# Patient Record
Sex: FEMALE | Race: BLACK OR AFRICAN AMERICAN | NOT HISPANIC OR LATINO | Employment: FULL TIME | URBAN - METROPOLITAN AREA
[De-identification: names, ages, dates, MRNs, and addresses within clinical notes are randomized per-mention and may not be internally consistent; named-entity substitution may affect disease eponyms.]

---

## 2017-09-18 ENCOUNTER — HISTORICAL (OUTPATIENT)
Dept: INTERNAL MEDICINE | Facility: CLINIC | Age: 52
End: 2017-09-18

## 2017-09-29 ENCOUNTER — HISTORICAL (OUTPATIENT)
Dept: INTERNAL MEDICINE | Facility: CLINIC | Age: 52
End: 2017-09-29

## 2017-10-11 ENCOUNTER — HISTORICAL (OUTPATIENT)
Dept: ADMINISTRATIVE | Facility: HOSPITAL | Age: 52
End: 2017-10-11

## 2017-12-27 ENCOUNTER — HISTORICAL (OUTPATIENT)
Dept: RADIOLOGY | Facility: HOSPITAL | Age: 52
End: 2017-12-27

## 2018-10-01 ENCOUNTER — HISTORICAL (OUTPATIENT)
Dept: INTERNAL MEDICINE | Facility: CLINIC | Age: 53
End: 2018-10-01

## 2018-10-01 ENCOUNTER — HISTORICAL (OUTPATIENT)
Dept: RADIOLOGY | Facility: HOSPITAL | Age: 53
End: 2018-10-01

## 2018-10-01 LAB
ABS NEUT (OLG): 1.89 X10(3)/MCL (ref 2.1–9.2)
ALBUMIN SERPL-MCNC: 3.9 GM/DL (ref 3.4–5)
ALBUMIN/GLOB SERPL: 1 RATIO (ref 1–2)
ALP SERPL-CCNC: 82 UNIT/L (ref 45–117)
ALT SERPL-CCNC: 18 UNIT/L (ref 12–78)
APPEARANCE, UA: CLEAR
AST SERPL-CCNC: 13 UNIT/L (ref 15–37)
BACTERIA #/AREA URNS AUTO: ABNORMAL /[HPF]
BASOPHILS # BLD AUTO: 0.04 X10(3)/MCL
BASOPHILS NFR BLD AUTO: 1 %
BILIRUB SERPL-MCNC: 0.4 MG/DL (ref 0.2–1)
BILIRUB UR QL STRIP: NEGATIVE
BILIRUBIN DIRECT+TOT PNL SERPL-MCNC: 0.1 MG/DL
BILIRUBIN DIRECT+TOT PNL SERPL-MCNC: 0.3 MG/DL
BUN SERPL-MCNC: 15 MG/DL (ref 7–18)
CALCIUM SERPL-MCNC: 9 MG/DL (ref 8.5–10.1)
CHLORIDE SERPL-SCNC: 105 MMOL/L (ref 98–107)
CHOLEST SERPL-MCNC: 199 MG/DL
CHOLEST/HDLC SERPL: 2.6 {RATIO} (ref 0–4.4)
CO2 SERPL-SCNC: 30 MMOL/L (ref 21–32)
COLOR UR: YELLOW
CREAT SERPL-MCNC: 0.9 MG/DL (ref 0.6–1.3)
DEPRECATED CALCIDIOL+CALCIFEROL SERPL-MC: 6.99 NG/ML (ref 30–80)
EOSINOPHIL # BLD AUTO: 0.01 10*3/UL
EOSINOPHIL NFR BLD AUTO: 0 %
ERYTHROCYTE [DISTWIDTH] IN BLOOD BY AUTOMATED COUNT: 13.2 % (ref 11.5–14.5)
GLOBULIN SER-MCNC: 4.4 GM/ML (ref 2.3–3.5)
GLUCOSE (UA): NORMAL
GLUCOSE SERPL-MCNC: 102 MG/DL (ref 74–106)
HCT VFR BLD AUTO: 37.2 % (ref 35–46)
HDLC SERPL-MCNC: 77 MG/DL
HGB BLD-MCNC: 11.7 GM/DL (ref 12–16)
HGB UR QL STRIP: NEGATIVE
HYALINE CASTS #/AREA URNS LPF: ABNORMAL /[LPF]
IMM GRANULOCYTES # BLD AUTO: 0.01 10*3/UL
IMM GRANULOCYTES NFR BLD AUTO: 0 %
IRON SERPL-MCNC: 76 MCG/DL (ref 50–170)
KETONES UR QL STRIP: NEGATIVE
LDLC SERPL CALC-MCNC: 104 MG/DL (ref 0–130)
LEUKOCYTE ESTERASE UR QL STRIP: NEGATIVE
LYMPHOCYTES # BLD AUTO: 1.96 X10(3)/MCL
LYMPHOCYTES NFR BLD AUTO: 46 % (ref 13–40)
MCH RBC QN AUTO: 28.3 PG (ref 26–34)
MCHC RBC AUTO-ENTMCNC: 31.5 GM/DL (ref 31–37)
MCV RBC AUTO: 90.1 FL (ref 80–100)
MONOCYTES # BLD AUTO: 0.34 X10(3)/MCL
MONOCYTES NFR BLD AUTO: 8 % (ref 4–12)
MUCOUS THREADS URNS QL MICRO: ABNORMAL
NEUTROPHILS # BLD AUTO: 1.89 X10(3)/MCL
NEUTROPHILS NFR BLD AUTO: 45 X10(3)/MCL
NITRITE UR QL STRIP: NEGATIVE
PH UR STRIP: 6.5 [PH] (ref 4.5–8)
PLATELET # BLD AUTO: 303 X10(3)/MCL (ref 130–400)
PMV BLD AUTO: 11.6 FL (ref 7.4–10.4)
POTASSIUM SERPL-SCNC: 4 MMOL/L (ref 3.5–5.1)
PROT SERPL-MCNC: 8.3 GM/DL (ref 6.4–8.2)
PROT UR QL STRIP: NEGATIVE
RBC # BLD AUTO: 4.13 X10(6)/MCL (ref 4–5.2)
RBC #/AREA URNS AUTO: ABNORMAL /[HPF]
RPR SER QL: REACTIVE
SODIUM SERPL-SCNC: 140 MMOL/L (ref 136–145)
SP GR UR STRIP: 1.02 (ref 1–1.03)
SQUAMOUS #/AREA URNS LPF: >100 /[LPF]
T PALLIDUM AB SER QL: REACTIVE
TRIGL SERPL-MCNC: 90 MG/DL
TSH SERPL-ACNC: 1.7 MIU/L (ref 0.36–3.74)
UROBILINOGEN UR STRIP-ACNC: NORMAL
VLDLC SERPL CALC-MCNC: 18 MG/DL
WBC # SPEC AUTO: 4.2 X10(3)/MCL (ref 4.5–11)
WBC #/AREA URNS AUTO: ABNORMAL /HPF

## 2019-04-02 ENCOUNTER — HISTORICAL (OUTPATIENT)
Dept: INTERNAL MEDICINE | Facility: CLINIC | Age: 54
End: 2019-04-02

## 2019-04-02 LAB — DEPRECATED CALCIDIOL+CALCIFEROL SERPL-MC: 16.26 NG/ML (ref 30–80)

## 2019-10-02 ENCOUNTER — HISTORICAL (OUTPATIENT)
Dept: ADMINISTRATIVE | Facility: HOSPITAL | Age: 54
End: 2019-10-02

## 2019-10-02 LAB
ABS NEUT (OLG): 1.65 X10(3)/MCL (ref 2.1–9.2)
ALBUMIN SERPL-MCNC: 3.9 GM/DL (ref 3.4–5)
ALBUMIN/GLOB SERPL: 1.1 RATIO (ref 1.1–2)
ALP SERPL-CCNC: 73 UNIT/L (ref 45–117)
ALT SERPL-CCNC: 20 UNIT/L (ref 12–78)
APPEARANCE, UA: CLEAR
AST SERPL-CCNC: 13 UNIT/L (ref 15–37)
BACTERIA #/AREA URNS AUTO: ABNORMAL /[HPF]
BASOPHILS # BLD AUTO: 0 X10(3)/MCL (ref 0–0.2)
BASOPHILS NFR BLD AUTO: 1 %
BILIRUB SERPL-MCNC: 0.5 MG/DL (ref 0.2–1)
BILIRUB UR QL STRIP: NEGATIVE
BILIRUBIN DIRECT+TOT PNL SERPL-MCNC: 0.1 MG/DL (ref 0–0.2)
BILIRUBIN DIRECT+TOT PNL SERPL-MCNC: 0.4 MG/DL
BUN SERPL-MCNC: 12 MG/DL (ref 7–18)
CALCIUM SERPL-MCNC: 9.1 MG/DL (ref 8.5–10.1)
CHLORIDE SERPL-SCNC: 110 MMOL/L (ref 98–107)
CHOLEST SERPL-MCNC: 185 MG/DL
CHOLEST/HDLC SERPL: 2.7 {RATIO} (ref 0–4.4)
CO2 SERPL-SCNC: 28 MMOL/L (ref 21–32)
COLOR UR: YELLOW
CREAT SERPL-MCNC: 0.8 MG/DL (ref 0.6–1.3)
DEPRECATED CALCIDIOL+CALCIFEROL SERPL-MC: 18.09 NG/ML (ref 30–80)
EOSINOPHIL # BLD AUTO: 0 X10(3)/MCL (ref 0–0.9)
EOSINOPHIL NFR BLD AUTO: 0 %
ERYTHROCYTE [DISTWIDTH] IN BLOOD BY AUTOMATED COUNT: 13.2 % (ref 11.5–14.5)
EST. AVERAGE GLUCOSE BLD GHB EST-MCNC: 123 MG/DL
GLOBULIN SER-MCNC: 3.7 GM/ML (ref 2.3–3.5)
GLUCOSE (UA): NORMAL
GLUCOSE SERPL-MCNC: 89 MG/DL (ref 74–106)
HBA1C MFR BLD: 5.9 % (ref 4.2–6.3)
HCT VFR BLD AUTO: 38.5 % (ref 35–46)
HDLC SERPL-MCNC: 68 MG/DL (ref 40–59)
HGB BLD-MCNC: 12.1 GM/DL (ref 12–16)
HGB UR QL STRIP: NEGATIVE
HYALINE CASTS #/AREA URNS LPF: ABNORMAL /[LPF]
KETONES UR QL STRIP: NEGATIVE
LDLC SERPL CALC-MCNC: 95 MG/DL
LEUKOCYTE ESTERASE UR QL STRIP: NEGATIVE
LYMPHOCYTES # BLD AUTO: 1.5 X10(3)/MCL (ref 0.6–4.6)
LYMPHOCYTES NFR BLD AUTO: 41 %
MCH RBC QN AUTO: 28.8 PG (ref 26–34)
MCHC RBC AUTO-ENTMCNC: 31.4 GM/DL (ref 31–37)
MCV RBC AUTO: 91.7 FL (ref 80–100)
MONOCYTES # BLD AUTO: 0.4 X10(3)/MCL (ref 0.1–1.3)
MONOCYTES NFR BLD AUTO: 11 %
NEUTROPHILS # BLD AUTO: 1.65 X10(3)/MCL (ref 2.1–9.2)
NEUTROPHILS NFR BLD AUTO: 46 %
NITRITE UR QL STRIP: NEGATIVE
PH UR STRIP: 6 [PH] (ref 4.5–8)
PLATELET # BLD AUTO: 282 X10(3)/MCL (ref 130–400)
PMV BLD AUTO: 12.6 FL (ref 7.4–10.4)
POTASSIUM SERPL-SCNC: 3.9 MMOL/L (ref 3.5–5.1)
PROT SERPL-MCNC: 7.6 GM/DL (ref 6.4–8.2)
PROT UR QL STRIP: 20 MG/DL
RBC # BLD AUTO: 4.2 X10(6)/MCL (ref 4–5.2)
RBC #/AREA URNS AUTO: ABNORMAL /[HPF]
SODIUM SERPL-SCNC: 142 MMOL/L (ref 136–145)
SP GR UR STRIP: 1.03 (ref 1–1.03)
SQUAMOUS #/AREA URNS LPF: >100 /[LPF]
TRIGL SERPL-MCNC: 112 MG/DL
TSH SERPL-ACNC: 0.79 MIU/L (ref 0.36–3.74)
UROBILINOGEN UR STRIP-ACNC: NORMAL
VLDLC SERPL CALC-MCNC: 22 MG/DL
WBC # SPEC AUTO: 3.6 X10(3)/MCL (ref 4.5–11)
WBC #/AREA URNS AUTO: ABNORMAL /HPF

## 2019-10-03 ENCOUNTER — HISTORICAL (OUTPATIENT)
Dept: RADIOLOGY | Facility: HOSPITAL | Age: 54
End: 2019-10-03

## 2019-10-29 ENCOUNTER — HISTORICAL (OUTPATIENT)
Dept: RADIOLOGY | Facility: HOSPITAL | Age: 54
End: 2019-10-29

## 2020-05-28 ENCOUNTER — HISTORICAL (OUTPATIENT)
Dept: LAB | Facility: HOSPITAL | Age: 55
End: 2020-05-28

## 2020-11-18 ENCOUNTER — HISTORICAL (OUTPATIENT)
Dept: INTERNAL MEDICINE | Facility: CLINIC | Age: 55
End: 2020-11-18

## 2020-11-18 LAB
ABS NEUT (OLG): 1.89 X10(3)/MCL (ref 2.1–9.2)
ALBUMIN SERPL-MCNC: 4 GM/DL (ref 3.5–5)
ALBUMIN/GLOB SERPL: 1.1 RATIO (ref 1.1–2)
ALP SERPL-CCNC: 77 UNIT/L (ref 40–150)
ALT SERPL-CCNC: 12 UNIT/L (ref 0–55)
AST SERPL-CCNC: 13 UNIT/L (ref 5–34)
BASOPHILS # BLD AUTO: 0 X10(3)/MCL (ref 0–0.2)
BASOPHILS NFR BLD AUTO: 1 %
BILIRUB SERPL-MCNC: 0.4 MG/DL
BILIRUBIN DIRECT+TOT PNL SERPL-MCNC: 0.2 MG/DL (ref 0–0.5)
BILIRUBIN DIRECT+TOT PNL SERPL-MCNC: 0.2 MG/DL (ref 0–0.8)
BUN SERPL-MCNC: 16 MG/DL (ref 9.8–20.1)
CALCIUM SERPL-MCNC: 9.6 MG/DL (ref 8.4–10.2)
CHLORIDE SERPL-SCNC: 104 MMOL/L (ref 98–107)
CHOLEST SERPL-MCNC: 194 MG/DL
CHOLEST/HDLC SERPL: 3 {RATIO} (ref 0–5)
CO2 SERPL-SCNC: 27 MMOL/L (ref 22–29)
CREAT SERPL-MCNC: 0.79 MG/DL (ref 0.55–1.02)
DEPRECATED CALCIDIOL+CALCIFEROL SERPL-MC: 26.3 NG/ML (ref 30–80)
ERYTHROCYTE [DISTWIDTH] IN BLOOD BY AUTOMATED COUNT: 13.3 % (ref 11.5–14.5)
EST. AVERAGE GLUCOSE BLD GHB EST-MCNC: 114 MG/DL
GLOBULIN SER-MCNC: 3.5 GM/DL (ref 2.4–3.5)
GLUCOSE SERPL-MCNC: 92 MG/DL (ref 74–100)
HBA1C MFR BLD: 5.6 %
HCT VFR BLD AUTO: 38 % (ref 35–46)
HDLC SERPL-MCNC: 69 MG/DL (ref 35–60)
HGB BLD-MCNC: 11.6 GM/DL (ref 12–16)
IMM GRANULOCYTES # BLD AUTO: 0.01 10*3/UL
IMM GRANULOCYTES NFR BLD AUTO: 0 %
LDLC SERPL CALC-MCNC: 112 MG/DL (ref 50–140)
LYMPHOCYTES # BLD AUTO: 1.8 X10(3)/MCL (ref 0.6–4.6)
LYMPHOCYTES NFR BLD AUTO: 43 %
MCH RBC QN AUTO: 28.2 PG (ref 26–34)
MCHC RBC AUTO-ENTMCNC: 30.5 GM/DL (ref 31–37)
MCV RBC AUTO: 92.5 FL (ref 80–100)
MONOCYTES # BLD AUTO: 0.4 X10(3)/MCL (ref 0.1–1.3)
MONOCYTES NFR BLD AUTO: 10 %
NEUTROPHILS # BLD AUTO: 1.89 X10(3)/MCL (ref 2.1–9.2)
NEUTROPHILS NFR BLD AUTO: 45 %
PLATELET # BLD AUTO: 266 X10(3)/MCL (ref 130–400)
PMV BLD AUTO: 12.4 FL (ref 7.4–10.4)
POTASSIUM SERPL-SCNC: 4.3 MMOL/L (ref 3.5–5.1)
PROT SERPL-MCNC: 7.5 GM/DL (ref 6.4–8.3)
RBC # BLD AUTO: 4.11 X10(6)/MCL (ref 4–5.2)
SODIUM SERPL-SCNC: 140 MMOL/L (ref 136–145)
TRIGL SERPL-MCNC: 64 MG/DL (ref 37–140)
VLDLC SERPL CALC-MCNC: 13 MG/DL
WBC # SPEC AUTO: 4.2 X10(3)/MCL (ref 4.5–11)

## 2020-12-18 ENCOUNTER — HISTORICAL (OUTPATIENT)
Dept: RADIOLOGY | Facility: HOSPITAL | Age: 55
End: 2020-12-18

## 2021-05-14 ENCOUNTER — HISTORICAL (OUTPATIENT)
Dept: INTERNAL MEDICINE | Facility: CLINIC | Age: 56
End: 2021-05-14

## 2021-05-14 LAB
ABS NEUT (OLG): 1.29 X10(3)/MCL (ref 2.1–9.2)
ALBUMIN SERPL-MCNC: 4.2 GM/DL (ref 3.5–5)
ALBUMIN/GLOB SERPL: 1.3 RATIO (ref 1.1–2)
ALP SERPL-CCNC: 86 UNIT/L (ref 40–150)
ALT SERPL-CCNC: 11 UNIT/L (ref 0–55)
AST SERPL-CCNC: 12 UNIT/L (ref 5–34)
BASOPHILS # BLD AUTO: 0 X10(3)/MCL (ref 0–0.2)
BASOPHILS NFR BLD AUTO: 1 %
BILIRUB SERPL-MCNC: 0.6 MG/DL
BILIRUBIN DIRECT+TOT PNL SERPL-MCNC: 0.2 MG/DL (ref 0–0.5)
BILIRUBIN DIRECT+TOT PNL SERPL-MCNC: 0.4 MG/DL (ref 0–0.8)
BUN SERPL-MCNC: 15.6 MG/DL (ref 9.8–20.1)
CALCIUM SERPL-MCNC: 10.2 MG/DL (ref 8.4–10.2)
CHLORIDE SERPL-SCNC: 105 MMOL/L (ref 98–107)
CHOLEST SERPL-MCNC: 210 MG/DL
CHOLEST/HDLC SERPL: 3 {RATIO} (ref 0–5)
CO2 SERPL-SCNC: 28 MMOL/L (ref 22–29)
CREAT SERPL-MCNC: 0.8 MG/DL (ref 0.55–1.02)
DEPRECATED CALCIDIOL+CALCIFEROL SERPL-MC: 35 NG/ML (ref 30–80)
EOSINOPHIL # BLD AUTO: 0 X10(3)/MCL (ref 0–0.9)
EOSINOPHIL NFR BLD AUTO: 0 %
ERYTHROCYTE [DISTWIDTH] IN BLOOD BY AUTOMATED COUNT: 13.2 % (ref 11.5–14.5)
EST. AVERAGE GLUCOSE BLD GHB EST-MCNC: 114 MG/DL
GLOBULIN SER-MCNC: 3.2 GM/DL (ref 2.4–3.5)
GLUCOSE SERPL-MCNC: 91 MG/DL (ref 74–100)
HBA1C MFR BLD: 5.6 %
HCT VFR BLD AUTO: 37.8 % (ref 35–46)
HDLC SERPL-MCNC: 70 MG/DL (ref 35–60)
HGB BLD-MCNC: 11.7 GM/DL (ref 12–16)
IMM GRANULOCYTES # BLD AUTO: 0.01 10*3/UL
IMM GRANULOCYTES NFR BLD AUTO: 0 %
LDLC SERPL CALC-MCNC: 123 MG/DL (ref 50–140)
LYMPHOCYTES # BLD AUTO: 1.4 X10(3)/MCL (ref 0.6–4.6)
LYMPHOCYTES NFR BLD AUTO: 45 %
MCH RBC QN AUTO: 28.1 PG (ref 26–34)
MCHC RBC AUTO-ENTMCNC: 31 GM/DL (ref 31–37)
MCV RBC AUTO: 90.6 FL (ref 80–100)
MONOCYTES # BLD AUTO: 0.4 X10(3)/MCL (ref 0.1–1.3)
MONOCYTES NFR BLD AUTO: 12 %
NEUTROPHILS # BLD AUTO: 1.29 X10(3)/MCL (ref 2.1–9.2)
NEUTROPHILS NFR BLD AUTO: 42 %
PLATELET # BLD AUTO: 282 X10(3)/MCL (ref 130–400)
PMV BLD AUTO: 11.7 FL (ref 7.4–10.4)
POTASSIUM SERPL-SCNC: 4.5 MMOL/L (ref 3.5–5.1)
PROT SERPL-MCNC: 7.4 GM/DL (ref 6.4–8.3)
RBC # BLD AUTO: 4.17 X10(6)/MCL (ref 4–5.2)
SODIUM SERPL-SCNC: 141 MMOL/L (ref 136–145)
TRIGL SERPL-MCNC: 84 MG/DL (ref 37–140)
VLDLC SERPL CALC-MCNC: 17 MG/DL
WBC # SPEC AUTO: 3.1 X10(3)/MCL (ref 4.5–11)

## 2021-05-20 ENCOUNTER — HISTORICAL (OUTPATIENT)
Dept: ADMINISTRATIVE | Facility: HOSPITAL | Age: 56
End: 2021-05-20

## 2021-06-17 LAB — CRC RECOMMENDATION EXT: NORMAL

## 2021-12-22 ENCOUNTER — HISTORICAL (OUTPATIENT)
Dept: RADIOLOGY | Facility: HOSPITAL | Age: 56
End: 2021-12-22

## 2022-01-01 ENCOUNTER — CLINICAL SUPPORT (OUTPATIENT)
Dept: OTHER | Facility: CLINIC | Age: 57
End: 2022-01-01

## 2022-01-01 ENCOUNTER — LAB VISIT (OUTPATIENT)
Dept: LAB | Facility: HOSPITAL | Age: 57
End: 2022-01-01
Attending: NURSE PRACTITIONER
Payer: COMMERCIAL

## 2022-01-01 ENCOUNTER — PROCEDURE VISIT (OUTPATIENT)
Dept: ORTHOPEDICS | Facility: CLINIC | Age: 57
End: 2022-01-01
Payer: COMMERCIAL

## 2022-01-01 ENCOUNTER — HOSPITAL ENCOUNTER (OUTPATIENT)
Dept: RADIOLOGY | Facility: HOSPITAL | Age: 57
Discharge: HOME | End: 2022-12-30
Attending: NURSE PRACTITIONER
Payer: COMMERCIAL

## 2022-01-01 ENCOUNTER — OFFICE VISIT (OUTPATIENT)
Dept: ORTHOPEDICS | Facility: CLINIC | Age: 57
End: 2022-01-01
Payer: COMMERCIAL

## 2022-01-01 ENCOUNTER — TELEPHONE (OUTPATIENT)
Dept: INTERNAL MEDICINE | Facility: CLINIC | Age: 57
End: 2022-01-01
Payer: COMMERCIAL

## 2022-01-01 ENCOUNTER — TELEPHONE (OUTPATIENT)
Dept: GYNECOLOGY | Facility: CLINIC | Age: 57
End: 2022-01-01
Payer: COMMERCIAL

## 2022-01-01 ENCOUNTER — HOSPITAL ENCOUNTER (EMERGENCY)
Facility: HOSPITAL | Age: 57
Discharge: HOME | End: 2022-10-11
Attending: FAMILY MEDICINE
Payer: COMMERCIAL

## 2022-01-01 ENCOUNTER — OFFICE VISIT (OUTPATIENT)
Dept: GYNECOLOGY | Facility: CLINIC | Age: 57
End: 2022-01-01
Payer: COMMERCIAL

## 2022-01-01 ENCOUNTER — OFFICE VISIT (OUTPATIENT)
Dept: INTERNAL MEDICINE | Facility: CLINIC | Age: 57
End: 2022-01-01
Payer: COMMERCIAL

## 2022-01-01 ENCOUNTER — DOCUMENTATION ONLY (OUTPATIENT)
Dept: INTERNAL MEDICINE | Facility: CLINIC | Age: 57
End: 2022-01-01

## 2022-01-01 ENCOUNTER — HOSPITAL ENCOUNTER (OUTPATIENT)
Dept: RADIOLOGY | Facility: HOSPITAL | Age: 57
Discharge: HOME | End: 2022-12-29
Attending: NURSE PRACTITIONER
Payer: COMMERCIAL

## 2022-01-01 ENCOUNTER — CLINICAL SUPPORT (OUTPATIENT)
Dept: INTERNAL MEDICINE | Facility: CLINIC | Age: 57
End: 2022-01-01
Payer: COMMERCIAL

## 2022-01-01 VITALS
WEIGHT: 227 LBS | WEIGHT: 227.06 LBS | TEMPERATURE: 98 F | RESPIRATION RATE: 16 BRPM | SYSTOLIC BLOOD PRESSURE: 139 MMHG | DIASTOLIC BLOOD PRESSURE: 81 MMHG | BODY MASS INDEX: 36.48 KG/M2 | HEIGHT: 66 IN | BODY MASS INDEX: 36.49 KG/M2 | HEIGHT: 66 IN | TEMPERATURE: 98 F

## 2022-01-01 VITALS
BODY MASS INDEX: 37.57 KG/M2 | SYSTOLIC BLOOD PRESSURE: 119 MMHG | WEIGHT: 225.5 LBS | OXYGEN SATURATION: 100 % | HEIGHT: 65 IN | DIASTOLIC BLOOD PRESSURE: 78 MMHG

## 2022-01-01 VITALS
HEART RATE: 65 BPM | WEIGHT: 223 LBS | BODY MASS INDEX: 37.15 KG/M2 | DIASTOLIC BLOOD PRESSURE: 79 MMHG | TEMPERATURE: 98 F | SYSTOLIC BLOOD PRESSURE: 115 MMHG | HEIGHT: 65 IN

## 2022-01-01 VITALS
BODY MASS INDEX: 35.36 KG/M2 | WEIGHT: 220 LBS | RESPIRATION RATE: 18 BRPM | HEART RATE: 83 BPM | TEMPERATURE: 99 F | HEIGHT: 66 IN | SYSTOLIC BLOOD PRESSURE: 135 MMHG | DIASTOLIC BLOOD PRESSURE: 51 MMHG | OXYGEN SATURATION: 99 %

## 2022-01-01 VITALS
BODY MASS INDEX: 37.79 KG/M2 | SYSTOLIC BLOOD PRESSURE: 123 MMHG | HEIGHT: 65 IN | WEIGHT: 226.81 LBS | DIASTOLIC BLOOD PRESSURE: 81 MMHG

## 2022-01-01 VITALS
TEMPERATURE: 98 F | HEART RATE: 68 BPM | HEIGHT: 66 IN | RESPIRATION RATE: 16 BRPM | SYSTOLIC BLOOD PRESSURE: 123 MMHG | WEIGHT: 230 LBS | OXYGEN SATURATION: 100 % | DIASTOLIC BLOOD PRESSURE: 74 MMHG | BODY MASS INDEX: 36.96 KG/M2

## 2022-01-01 VITALS — HEIGHT: 66 IN | BODY MASS INDEX: 35.36 KG/M2 | WEIGHT: 220 LBS

## 2022-01-01 DIAGNOSIS — E78.2 MIXED HYPERLIPIDEMIA: ICD-10-CM

## 2022-01-01 DIAGNOSIS — M17.12 TRICOMPARTMENT OSTEOARTHRITIS OF LEFT KNEE: Primary | ICD-10-CM

## 2022-01-01 DIAGNOSIS — Z00.00 WELLNESS EXAMINATION: Primary | ICD-10-CM

## 2022-01-01 DIAGNOSIS — R59.9 ENLARGED LYMPH NODE: ICD-10-CM

## 2022-01-01 DIAGNOSIS — Z12.31 ENCOUNTER FOR SCREENING MAMMOGRAM FOR MALIGNANT NEOPLASM OF BREAST: ICD-10-CM

## 2022-01-01 DIAGNOSIS — Z12.11 COLON CANCER SCREENING: ICD-10-CM

## 2022-01-01 DIAGNOSIS — Z00.8 ENCOUNTER FOR OTHER GENERAL EXAMINATION: ICD-10-CM

## 2022-01-01 DIAGNOSIS — N61.0 CELLULITIS OF RIGHT BREAST: Primary | ICD-10-CM

## 2022-01-01 DIAGNOSIS — R59.0 AXILLARY ADENOPATHY: ICD-10-CM

## 2022-01-01 DIAGNOSIS — N64.4 BREAST PAIN, LEFT: Primary | ICD-10-CM

## 2022-01-01 DIAGNOSIS — M17.12 PRIMARY OSTEOARTHRITIS OF LEFT KNEE: Primary | ICD-10-CM

## 2022-01-01 DIAGNOSIS — N64.4 BREAST PAIN, LEFT: ICD-10-CM

## 2022-01-01 DIAGNOSIS — Z00.00 WELLNESS EXAMINATION: ICD-10-CM

## 2022-01-01 DIAGNOSIS — R73.03 PREDIABETES: ICD-10-CM

## 2022-01-01 DIAGNOSIS — N61.0 CELLULITIS OF RIGHT BREAST: ICD-10-CM

## 2022-01-01 DIAGNOSIS — N63.10 BREAST MASS, RIGHT: ICD-10-CM

## 2022-01-01 DIAGNOSIS — E55.9 VITAMIN D DEFICIENCY: ICD-10-CM

## 2022-01-01 DIAGNOSIS — N63.15 MASS OVERLAPPING MULTIPLE QUADRANTS OF RIGHT BREAST: ICD-10-CM

## 2022-01-01 DIAGNOSIS — Z01.419 WOMEN'S ANNUAL ROUTINE GYNECOLOGICAL EXAMINATION: Primary | ICD-10-CM

## 2022-01-01 DIAGNOSIS — N64.4 BREAST PAIN, RIGHT: Primary | ICD-10-CM

## 2022-01-01 LAB
ALBUMIN SERPL-MCNC: 4 GM/DL (ref 3.5–5)
ALBUMIN/GLOB SERPL: 1 RATIO (ref 1.1–2)
ALP SERPL-CCNC: 77 UNIT/L (ref 40–150)
ALT SERPL-CCNC: 12 UNIT/L (ref 0–55)
APPEARANCE UR: ABNORMAL
AST SERPL-CCNC: 12 UNIT/L (ref 5–34)
BACTERIA #/AREA URNS AUTO: ABNORMAL /HPF
BASOPHILS # BLD AUTO: 0.02 X10(3)/MCL (ref 0–0.2)
BASOPHILS NFR BLD AUTO: 0.5 %
BILIRUB UR QL STRIP.AUTO: NEGATIVE MG/DL
BILIRUBIN DIRECT+TOT PNL SERPL-MCNC: 0.5 MG/DL
BUN SERPL-MCNC: 13 MG/DL (ref 9.8–20.1)
CALCIUM SERPL-MCNC: 10.2 MG/DL (ref 8.4–10.2)
CHLORIDE SERPL-SCNC: 105 MMOL/L (ref 98–107)
CHOLEST SERPL-MCNC: 240 MG/DL
CHOLEST/HDLC SERPL: 3 {RATIO} (ref 0–5)
CO2 SERPL-SCNC: 28 MMOL/L (ref 22–29)
COLOR UR AUTO: YELLOW
CREAT SERPL-MCNC: 0.8 MG/DL (ref 0.55–1.02)
EOSINOPHIL # BLD AUTO: 0.01 X10(3)/MCL (ref 0–0.9)
EOSINOPHIL NFR BLD AUTO: 0.3 %
ERYTHROCYTE [DISTWIDTH] IN BLOOD BY AUTOMATED COUNT: 12.9 % (ref 11.5–17)
GLOBULIN SER-MCNC: 3.9 GM/DL (ref 2.4–3.5)
GLUCOSE SERPL-MCNC: 108 MG/DL (ref 60–140)
GLUCOSE SERPL-MCNC: 98 MG/DL (ref 74–100)
GLUCOSE UR QL STRIP.AUTO: NORMAL MG/DL
GRAM STN SPEC: NORMAL
GRAM STN SPEC: NORMAL
HBA1C MFR BLD: NORMAL %
HCT VFR BLD AUTO: 40.3 % (ref 37–47)
HDLC SERPL-MCNC: 53 MG/DL
HDLC SERPL-MCNC: 80 MG/DL (ref 35–60)
HGB BLD-MCNC: 12.3 GM/DL (ref 12–16)
HYALINE CASTS #/AREA URNS LPF: ABNORMAL /LPF
IMM GRANULOCYTES # BLD AUTO: 0.01 X10(3)/MCL (ref 0–0.02)
IMM GRANULOCYTES NFR BLD AUTO: 0.3 % (ref 0–0.43)
INSULIN SERPL-ACNC: NORMAL U[IU]/ML
KETONES UR QL STRIP.AUTO: NEGATIVE MG/DL
LAB AP BETHESDA CATEGORY: NORMAL
LAB AP CLINICAL FINDINGS: NORMAL
LAB AP CONTRACEPTIVES: NORMAL
LAB AP GYN MOLECULAR TESTING: NORMAL
LAB AP LMP DATE: NORMAL
LAB AP OCHS PAP SPECIMEN ADEQUACY: NORMAL
LAB AP OHS PAP INTERPRETATION: NORMAL
LAB AP PAP DISCLAIMER COMMENTS: NORMAL
LAB AP PAP ESTROGEN REPLACEMENT THERAPY: NORMAL
LAB AP PAP PMP: NORMAL
LAB AP PAP PREVIOUS BX: NORMAL
LAB AP PAP PRIOR TREATMENT: NORMAL
LDLC SERPL CALC-MCNC: 146 MG/DL (ref 50–140)
LEUKOCYTE ESTERASE UR QL STRIP.AUTO: 25 UNIT/L
LYMPHOCYTES # BLD AUTO: 1.68 X10(3)/MCL (ref 0.6–4.6)
LYMPHOCYTES NFR BLD AUTO: 44.8 %
MCH RBC QN AUTO: 27.9 PG (ref 27–31)
MCHC RBC AUTO-ENTMCNC: 30.5 MG/DL (ref 33–36)
MCV RBC AUTO: 91.4 FL (ref 80–94)
MONOCYTES # BLD AUTO: 0.36 X10(3)/MCL (ref 0.1–1.3)
MONOCYTES NFR BLD AUTO: 9.6 %
MUCOUS THREADS URNS QL MICRO: ABNORMAL /LPF
NEUTROPHILS # BLD AUTO: 1.7 X10(3)/MCL (ref 2.1–9.2)
NEUTROPHILS NFR BLD AUTO: 44.5 %
NITRITE UR QL STRIP.AUTO: NEGATIVE
NONINV COLON CA DNA+OCC BLD SCRN STL QL: NEGATIVE
NRBC BLD AUTO-RTO: 0 %
PH UR STRIP.AUTO: 6 [PH]
PLATELET # BLD AUTO: 298 X10(3)/MCL (ref 130–400)
PMV BLD AUTO: 12.1 FL (ref 9.4–12.4)
POC CHOLESTEROL, LDL (DOCK): 116.41 MG/DL
POC CHOLESTEROL, TOTAL: 196 MG/DL
POTASSIUM SERPL-SCNC: 4.1 MMOL/L (ref 3.5–5.1)
PROT SERPL-MCNC: 7.9 GM/DL (ref 6.4–8.3)
PROT UR QL STRIP.AUTO: NEGATIVE MG/DL
RBC # BLD AUTO: 4.41 X10(6)/MCL (ref 4.2–5.4)
RBC #/AREA URNS AUTO: ABNORMAL /HPF
RBC UR QL AUTO: NEGATIVE UNIT/L
SODIUM SERPL-SCNC: 142 MMOL/L (ref 136–145)
SP GR UR STRIP.AUTO: 1.02
SQUAMOUS #/AREA URNS LPF: ABNORMAL /HPF
T4 FREE SERPL-MCNC: 0.93 NG/DL (ref 0.7–1.48)
TRIGL SERPL-MCNC: 151 MG/DL
TRIGL SERPL-MCNC: 69 MG/DL (ref 37–140)
TSH SERPL-ACNC: 1.41 UIU/ML (ref 0.35–4.94)
UROBILINOGEN UR STRIP-ACNC: NORMAL MG/DL
VLDLC SERPL CALC-MCNC: 14 MG/DL
WBC # SPEC AUTO: 3.8 X10(3)/MCL (ref 4.5–11.5)
WBC #/AREA URNS AUTO: ABNORMAL /HPF

## 2022-01-01 PROCEDURE — 81001 URINALYSIS AUTO W/SCOPE: CPT

## 2022-01-01 PROCEDURE — 1159F MED LIST DOCD IN RCRD: CPT | Mod: CPTII,,, | Performed by: NURSE PRACTITIONER

## 2022-01-01 PROCEDURE — 3075F SYST BP GE 130 - 139MM HG: CPT | Mod: CPTII,,, | Performed by: NURSE PRACTITIONER

## 2022-01-01 PROCEDURE — 1160F RVW MEDS BY RX/DR IN RCRD: CPT | Mod: CPTII,95,, | Performed by: NURSE PRACTITIONER

## 2022-01-01 PROCEDURE — 87077 CULTURE AEROBIC IDENTIFY: CPT | Performed by: RADIOLOGY

## 2022-01-01 PROCEDURE — 99284 EMERGENCY DEPT VISIT MOD MDM: CPT | Mod: 25

## 2022-01-01 PROCEDURE — 96372 THER/PROPH/DIAG INJ SC/IM: CPT | Mod: PBBFAC

## 2022-01-01 PROCEDURE — 76642 US BREAST RIGHT LIMITED: ICD-10-PCS | Mod: 26,RT,, | Performed by: RADIOLOGY

## 2022-01-01 PROCEDURE — 1159F PR MEDICATION LIST DOCUMENTED IN MEDICAL RECORD: ICD-10-PCS | Mod: CPTII,,, | Performed by: NURSE PRACTITIONER

## 2022-01-01 PROCEDURE — 3046F PR MOST RECENT HEMOGLOBIN A1C LEVEL > 9.0%: ICD-10-PCS | Mod: CPTII,95,, | Performed by: NURSE PRACTITIONER

## 2022-01-01 PROCEDURE — 99499 UNLISTED E&M SERVICE: CPT | Mod: S$PBB,,, | Performed by: NURSE PRACTITIONER

## 2022-01-01 PROCEDURE — 1159F MED LIST DOCD IN RCRD: CPT | Mod: CPTII,95,, | Performed by: NURSE PRACTITIONER

## 2022-01-01 PROCEDURE — 1160F PR REVIEW ALL MEDS BY PRESCRIBER/CLIN PHARMACIST DOCUMENTED: ICD-10-PCS | Mod: CPTII,95,, | Performed by: NURSE PRACTITIONER

## 2022-01-01 PROCEDURE — 3008F BODY MASS INDEX DOCD: CPT | Mod: CPTII,,, | Performed by: NURSE PRACTITIONER

## 2022-01-01 PROCEDURE — 3078F PR MOST RECENT DIASTOLIC BLOOD PRESSURE < 80 MM HG: ICD-10-PCS | Mod: CPTII,,, | Performed by: NURSE PRACTITIONER

## 2022-01-01 PROCEDURE — 3074F PR MOST RECENT SYSTOLIC BLOOD PRESSURE < 130 MM HG: ICD-10-PCS | Mod: CPTII,,, | Performed by: NURSE PRACTITIONER

## 2022-01-01 PROCEDURE — 38505 NEEDLE BIOPSY LYMPH NODES: CPT | Mod: RT,,, | Performed by: RADIOLOGY

## 2022-01-01 PROCEDURE — 36415 COLL VENOUS BLD VENIPUNCTURE: CPT

## 2022-01-01 PROCEDURE — 1160F PR REVIEW ALL MEDS BY PRESCRIBER/CLIN PHARMACIST DOCUMENTED: ICD-10-PCS | Mod: CPTII,,, | Performed by: NURSE PRACTITIONER

## 2022-01-01 PROCEDURE — 77062 MAMMO DIGITAL DIAGNOSTIC BILAT WITH TOMO: ICD-10-PCS | Mod: 26,,, | Performed by: RADIOLOGY

## 2022-01-01 PROCEDURE — 99212 OFFICE O/P EST SF 10 MIN: CPT | Mod: PBBFAC

## 2022-01-01 PROCEDURE — 3046F PR MOST RECENT HEMOGLOBIN A1C LEVEL > 9.0%: ICD-10-PCS | Mod: CPTII,,, | Performed by: NURSE PRACTITIONER

## 2022-01-01 PROCEDURE — 80061 LIPID PANEL: CPT

## 2022-01-01 PROCEDURE — 38505 US BIOPSY LYMPH NODE AXILLA: ICD-10-PCS | Mod: RT,,, | Performed by: RADIOLOGY

## 2022-01-01 PROCEDURE — 19083 BX BREAST 1ST LESION US IMAG: CPT | Mod: RT,,, | Performed by: RADIOLOGY

## 2022-01-01 PROCEDURE — 99215 OFFICE O/P EST HI 40 MIN: CPT | Mod: S$PBB,,, | Performed by: NURSE PRACTITIONER

## 2022-01-01 PROCEDURE — 85025 COMPLETE CBC W/AUTO DIFF WBC: CPT

## 2022-01-01 PROCEDURE — 76642 ULTRASOUND BREAST LIMITED: CPT | Mod: TC,RT

## 2022-01-01 PROCEDURE — 30000890 GENERAL MISCELLANEOUS TEST (BEAKER): Performed by: PATHOLOGY

## 2022-01-01 PROCEDURE — 77062 BREAST TOMOSYNTHESIS BI: CPT | Mod: TC

## 2022-01-01 PROCEDURE — 99442 PR PHYSICIAN TELEPHONE EVALUATION 11-20 MIN: ICD-10-PCS | Mod: 95,,, | Performed by: NURSE PRACTITIONER

## 2022-01-01 PROCEDURE — 99442 PR PHYSICIAN TELEPHONE EVALUATION 11-20 MIN: CPT | Mod: 95,,, | Performed by: NURSE PRACTITIONER

## 2022-01-01 PROCEDURE — A4648 IMPLANTABLE TISSUE MARKER: HCPCS

## 2022-01-01 PROCEDURE — 80053 COMPREHEN METABOLIC PANEL: CPT

## 2022-01-01 PROCEDURE — 99214 OFFICE O/P EST MOD 30 MIN: CPT | Mod: PBBFAC | Performed by: NURSE PRACTITIONER

## 2022-01-01 PROCEDURE — 19083 US BREAST BIOPSY WITH IMAGING 1ST SITE RIGHT: ICD-10-PCS | Mod: RT,,, | Performed by: RADIOLOGY

## 2022-01-01 PROCEDURE — 3008F PR BODY MASS INDEX (BMI) DOCUMENTED: ICD-10-PCS | Mod: CPTII,,, | Performed by: NURSE PRACTITIONER

## 2022-01-01 PROCEDURE — 3079F PR MOST RECENT DIASTOLIC BLOOD PRESSURE 80-89 MM HG: ICD-10-PCS | Mod: CPTII,,, | Performed by: NURSE PRACTITIONER

## 2022-01-01 PROCEDURE — 3074F SYST BP LT 130 MM HG: CPT | Mod: CPTII,,, | Performed by: NURSE PRACTITIONER

## 2022-01-01 PROCEDURE — 88360 TUMOR IMMUNOHISTOCHEM/MANUAL: CPT | Performed by: PATHOLOGY

## 2022-01-01 PROCEDURE — 77062 BREAST TOMOSYNTHESIS BI: CPT | Mod: 26,,, | Performed by: RADIOLOGY

## 2022-01-01 PROCEDURE — 99499 NO LOS: ICD-10-PCS | Mod: S$PBB,,, | Performed by: NURSE PRACTITIONER

## 2022-01-01 PROCEDURE — 99215 PR OFFICE/OUTPT VISIT, EST, LEVL V, 40-54 MIN: ICD-10-PCS | Mod: S$PBB,,, | Performed by: NURSE PRACTITIONER

## 2022-01-01 PROCEDURE — 1160F RVW MEDS BY RX/DR IN RCRD: CPT | Mod: CPTII,,, | Performed by: NURSE PRACTITIONER

## 2022-01-01 PROCEDURE — 77066 DX MAMMO INCL CAD BI: CPT | Mod: 26,,, | Performed by: RADIOLOGY

## 2022-01-01 PROCEDURE — 38505 NEEDLE BIOPSY LYMPH NODES: CPT

## 2022-01-01 PROCEDURE — 87205 SMEAR GRAM STAIN: CPT | Performed by: RADIOLOGY

## 2022-01-01 PROCEDURE — 3075F PR MOST RECENT SYSTOLIC BLOOD PRESS GE 130-139MM HG: ICD-10-PCS | Mod: CPTII,,, | Performed by: NURSE PRACTITIONER

## 2022-01-01 PROCEDURE — 19083 BX BREAST 1ST LESION US IMAG: CPT | Mod: RT

## 2022-01-01 PROCEDURE — 84443 ASSAY THYROID STIM HORMONE: CPT

## 2022-01-01 PROCEDURE — 99396 PREV VISIT EST AGE 40-64: CPT | Mod: S$PBB,,, | Performed by: NURSE PRACTITIONER

## 2022-01-01 PROCEDURE — 3079F DIAST BP 80-89 MM HG: CPT | Mod: CPTII,,, | Performed by: NURSE PRACTITIONER

## 2022-01-01 PROCEDURE — 76642 ULTRASOUND BREAST LIMITED: CPT | Mod: 26,RT,, | Performed by: RADIOLOGY

## 2022-01-01 PROCEDURE — 88305 TISSUE EXAM BY PATHOLOGIST: CPT | Performed by: RADIOLOGY

## 2022-01-01 PROCEDURE — 77066 MAMMO DIGITAL DIAGNOSTIC BILAT WITH TOMO: ICD-10-PCS | Mod: 26,,, | Performed by: RADIOLOGY

## 2022-01-01 PROCEDURE — 3046F HEMOGLOBIN A1C LEVEL >9.0%: CPT | Mod: CPTII,,, | Performed by: NURSE PRACTITIONER

## 2022-01-01 PROCEDURE — 1159F PR MEDICATION LIST DOCUMENTED IN MEDICAL RECORD: ICD-10-PCS | Mod: CPTII,95,, | Performed by: NURSE PRACTITIONER

## 2022-01-01 PROCEDURE — 88142 CYTOPATH C/V THIN LAYER: CPT | Performed by: NURSE PRACTITIONER

## 2022-01-01 PROCEDURE — 84439 ASSAY OF FREE THYROXINE: CPT

## 2022-01-01 PROCEDURE — 99213 PR OFFICE/OUTPT VISIT, EST, LEVL III, 20-29 MIN: ICD-10-PCS | Mod: S$PBB,,, | Performed by: NURSE PRACTITIONER

## 2022-01-01 PROCEDURE — 3078F DIAST BP <80 MM HG: CPT | Mod: CPTII,,, | Performed by: NURSE PRACTITIONER

## 2022-01-01 PROCEDURE — 99396 PR PREVENTIVE VISIT,EST,40-64: ICD-10-PCS | Mod: S$PBB,,, | Performed by: NURSE PRACTITIONER

## 2022-01-01 PROCEDURE — 99213 OFFICE O/P EST LOW 20 MIN: CPT | Mod: S$PBB,,, | Performed by: NURSE PRACTITIONER

## 2022-01-01 PROCEDURE — 3046F HEMOGLOBIN A1C LEVEL >9.0%: CPT | Mod: CPTII,95,, | Performed by: NURSE PRACTITIONER

## 2022-01-01 PROCEDURE — 83036 HEMOGLOBIN GLYCOSYLATED A1C: CPT | Mod: PBBFAC | Performed by: NURSE PRACTITIONER

## 2022-01-01 PROCEDURE — 99211 OFF/OP EST MAY X REQ PHY/QHP: CPT | Mod: PBBFAC

## 2022-01-01 RX ORDER — LIDOCAINE HYDROCHLORIDE 10 MG/ML
2.1 INJECTION, SOLUTION EPIDURAL; INFILTRATION; INTRACAUDAL; PERINEURAL
Status: COMPLETED | OUTPATIENT
Start: 2022-01-01 | End: 2022-01-01

## 2022-01-01 RX ORDER — WATER FOR INJECTION,STERILE
2.1 VIAL (ML) INJECTION
Status: DISCONTINUED | OUTPATIENT
Start: 2022-01-01 | End: 2022-01-01

## 2022-01-01 RX ORDER — LIDOCAINE HYDROCHLORIDE 10 MG/ML
2 INJECTION, SOLUTION EPIDURAL; INFILTRATION; INTRACAUDAL; PERINEURAL
Status: COMPLETED | OUTPATIENT
Start: 2022-01-01 | End: 2022-01-01

## 2022-01-01 RX ORDER — HYDROCODONE BITARTRATE AND ACETAMINOPHEN 7.5; 325 MG/1; MG/1
1 TABLET ORAL EVERY 6 HOURS PRN
Qty: 12 TABLET | Refills: 0 | Status: SHIPPED | OUTPATIENT
Start: 2022-01-01 | End: 2022-01-01 | Stop reason: SDUPTHER

## 2022-01-01 RX ORDER — CEFTRIAXONE 1 G/1
1 INJECTION, POWDER, FOR SOLUTION INTRAMUSCULAR; INTRAVENOUS
Status: COMPLETED | OUTPATIENT
Start: 2022-01-01 | End: 2022-01-01

## 2022-01-01 RX ORDER — LIDOCAINE HYDROCHLORIDE 10 MG/ML
2.1 INJECTION INFILTRATION; PERINEURAL
Status: COMPLETED | OUTPATIENT
Start: 2022-01-01 | End: 2022-01-01

## 2022-01-01 RX ORDER — CHOLECALCIFEROL (VITAMIN D3) 25 MCG
5000 TABLET ORAL DAILY
Status: ON HOLD | COMMUNITY
End: 2023-01-01

## 2022-01-01 RX ORDER — AMOXICILLIN 500 MG
CAPSULE ORAL DAILY
Status: ON HOLD | COMMUNITY
End: 2023-01-01

## 2022-01-01 RX ORDER — LIDOCAINE HYDROCHLORIDE 20 MG/ML
INJECTION, SOLUTION EPIDURAL; INFILTRATION; INTRACAUDAL; PERINEURAL
Status: DISCONTINUED
Start: 2022-01-01 | End: 2022-01-01 | Stop reason: HOSPADM

## 2022-01-01 RX ORDER — TRIAMCINOLONE ACETONIDE 40 MG/ML
40 INJECTION, SUSPENSION INTRA-ARTICULAR; INTRAMUSCULAR
Status: COMPLETED | OUTPATIENT
Start: 2022-01-01 | End: 2022-01-01

## 2022-01-01 RX ORDER — HYDROCODONE BITARTRATE AND ACETAMINOPHEN 7.5; 325 MG/1; MG/1
1 TABLET ORAL EVERY 6 HOURS PRN
Qty: 12 TABLET | Refills: 0 | Status: SHIPPED | OUTPATIENT
Start: 2022-01-01 | End: 2022-01-01

## 2022-01-01 RX ORDER — CLINDAMYCIN HYDROCHLORIDE 300 MG/1
300 CAPSULE ORAL EVERY 8 HOURS
Qty: 30 CAPSULE | Refills: 0 | Status: SHIPPED | OUTPATIENT
Start: 2022-01-01 | End: 2022-01-01

## 2022-01-01 RX ADMIN — LIDOCAINE HYDROCHLORIDE 20 MG: 10 INJECTION, SOLUTION EPIDURAL; INFILTRATION; INTRACAUDAL; PERINEURAL at 08:10

## 2022-01-01 RX ADMIN — CEFTRIAXONE SODIUM 1 G: 1 INJECTION, POWDER, FOR SOLUTION INTRAMUSCULAR; INTRAVENOUS at 08:12

## 2022-01-01 RX ADMIN — TRIAMCINOLONE ACETONIDE 40 MG: 40 INJECTION, SUSPENSION INTRA-ARTICULAR; INTRAMUSCULAR at 08:10

## 2022-01-01 RX ADMIN — LIDOCAINE HYDROCHLORIDE 21 MG: 10 INJECTION, SOLUTION EPIDURAL; INFILTRATION; INTRACAUDAL; PERINEURAL at 08:12

## 2022-01-01 RX ADMIN — LIDOCAINE HYDROCHLORIDE 2.1 ML: 10 INJECTION INFILTRATION; PERINEURAL at 10:12

## 2022-01-01 RX ADMIN — CEFTRIAXONE 1 G: 1 INJECTION, POWDER, FOR SOLUTION INTRAMUSCULAR; INTRAVENOUS at 08:12

## 2022-01-01 RX ADMIN — CEFTRIAXONE 1 G: 1 INJECTION, POWDER, FOR SOLUTION INTRAMUSCULAR; INTRAVENOUS at 09:12

## 2022-01-01 RX ADMIN — LIDOCAINE HYDROCHLORIDE 2.1 ML: 10 INJECTION, SOLUTION EPIDURAL; INFILTRATION; INTRACAUDAL; PERINEURAL at 08:12

## 2022-03-16 ENCOUNTER — HISTORICAL (OUTPATIENT)
Dept: ADMINISTRATIVE | Facility: HOSPITAL | Age: 57
End: 2022-03-16

## 2022-04-09 ENCOUNTER — HISTORICAL (OUTPATIENT)
Dept: ADMINISTRATIVE | Facility: HOSPITAL | Age: 57
End: 2022-04-09
Payer: COMMERCIAL

## 2022-05-02 NOTE — HISTORICAL [facility] C[facility]
This is a historical note converted from Walker. Formatting and pictures may have been removed.  Please reference Walker for original formatting and attached multimedia. Mohan was contacted at 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 Pt was treated 1992 with Bicillin 2.4 x 3 shots she was under the name Esme Marie in the system her previous maiden name

## 2022-05-02 NOTE — HISTORICAL [facility] C[facility]
This is a historical note converted from Walker. Formatting and pictures may have been removed.  Please reference Walker for original formatting and attached multimedia. Chief Complaint  follow up  History of Present Illness  52 yo Black female being seen in clinic for follow-up, medication reconciliation, and lab review (10/1/2018).? Hx includes OA bilateral knees, Vitamin D deficiency, and morbid obesity.  ?   Today 10/2/2019:  Had flu shot last week; has Gyn visit next week.? MMG being done tomorrow.? Refusing Tdap.? In her usual state of health since last clinic visit; no new changes.? Sleeping well; eating well.? Denies chest pain, shortness of breath,?cough, fever, headache,?dizziness, weakness, abdominal pain, nausea,?vomiting, diarrhea, constipation, dysuria, depression, anxiety, SI/HI.  Review of Systems  Except as stated in HPI, all other 10 body systems normal  ?  Physical Exam  Vitals & Measurements  T:?36.6? ?C (Oral)? HR:?61(Peripheral)? RR:?16? BP:?117/73?  HT:?165?cm? WT:?101.2?kg? BMI:?37.17?  General:??VSS; afebrile.?? Obese; in no acute distress  Eyes: EOMI, clear conjunctiva, eyelids normal  Mouth:??tongue midline, pink, and moist; no lesions or erythema in oral cavity  Neck: full range of motion, no thyromegaly or lymphadenopathy  Respiratory:?clear to auscultation anteriorly and posteriorly; diminished over anterior bases bilaterally; no cough.??  Cardiovascular:?regular rate and rhythm without murmurs, gallops or rubs.??No peripheral edema.??No hemosiderin staining or varicosities.?  Gastrointestinal:?obese abdomen, soft, non-tender, with normal bowel sounds, without masses to palpation  Genitourinary: deferred  Musculoskeletal:?full range of motion of all extremities; no joint deformities or edema  Integumentary: no rashes or skin lesions present  Neurologic: A&O X 3; cranial nerves intact, no signs of peripheral neurological deficit, motor/sensory function intact  Psychiatric:?Calm,  cooperative.??Mood and affect normal.??No s/s of depression or anxiety.??Responses appropriate  ?  Assessment/Plan  1.?Mixed hyperlipidemia?E78.2  Lipid Panel on 10/1/2018:? Chol 199, HDL 77, Trig 90,   Not prescribed any medications.? She wants to have labs repeated before starting a statin.  Ordered:  1160F- Medication reconciliation completed during visit, Mixed hyperlipidemia  Normocytic normochromic anemia  Localized osteoarthritis of both knees  BMI 38.0-38.9,adult  Vitamin D deficiency, [facility], 10/02/19 7:44:00 CDT  Clinic Follow up, *Est. 04/02/20 7:00:00 CDT, Order for future visit, Vitamin D deficiency, [facility]  Lipid Panel, Routine collect, *Est. 04/02/20 3:00:00 CDT, Blood, Order for future visit, *Est. Stop date 04/02/20 3:00:00 CDT, Lab Collect, Mixed hyperlipidemia, 10/02/19 7:46:00 CDT  Office/Outpatient Visit Level 4 Established [id number] PC, Mixed hyperlipidemia  Normocytic normochromic anemia  Localized osteoarthritis of both knees  BMI 38.0-38.9,adult  Vitamin D deficiency, [facility], 10/02/19 7:43:00 CDT  ?  2.?Normocytic normochromic anemia?D64.9  CBC on 10/1/2018:? Hgb 11.7  FIT negative on 10/3/2018  FIT ordered today; peripheral smear ordered for today  Ordered:  1160F- Medication reconciliation completed during visit, Mixed hyperlipidemia  Normocytic normochromic anemia  Localized osteoarthritis of both knees  BMI 38.0-38.9,adult  Vitamin D deficiency, [facility], 10/02/19 7:44:00 CDT  CBC w/ Auto Diff, Routine collect, *Est. 04/02/20 3:00:00 CDT, Blood, Order for future visit, *Est. Stop date 04/02/20 3:00:00 CDT, Lab Collect, Normocytic normochromic anemia, 10/02/19 7:46:00 CDT  Clinic Follow up, *Est. 04/02/20 7:00:00 CDT, Order for future visit, Vitamin D deficiency, [facility]  Office/Outpatient Visit Level 4 Established [id number] PC, Mixed hyperlipidemia  Normocytic normochromic anemia  Localized osteoarthritis of both knees  BMI 38.0-38.9,adult   Vitamin D deficiency, [facility], 10/02/19 7:43:00 CDT  ?  3.?Localized osteoarthritis of both knees?M17.0  Handouts given today.  Knees not bothering her badly enough to have xrays done?today.? Has stiffness in morning, which is resolved after 1-2 hours.? Will continue to monitor.  Ordered:  1160F- Medication reconciliation completed during visit, Mixed hyperlipidemia  Normocytic normochromic anemia  Localized osteoarthritis of both knees  BMI 38.0-38.9,adult  Vitamin D deficiency, [facility], 10/02/19 7:44:00 CDT  Clinic Follow up, *Est. 04/02/20 7:00:00 CDT, Order for future visit, Vitamin D deficiency, [facility]  Office/Outpatient Visit Level 4 Established 98549 PC, Mixed hyperlipidemia  Normocytic normochromic anemia  Localized osteoarthritis of both knees  BMI 38.0-38.9,adult  Vitamin D deficiency, [facility], 10/02/19 7:43:00 CDT  ?  4.?Vitamin D deficiency?E55.9  Handouts given today.  Has been treated with 50,000 IU therapy.  Vit D level 16.26 on 4/2/2019  Instructed to start taking 2000 IU daily, indefinitely.  Ordered:  1160F- Medication reconciliation completed during visit, Mixed hyperlipidemia  Normocytic normochromic anemia  Localized osteoarthritis of both knees  BMI 38.0-38.9,adult  Vitamin D deficiency, [facility], 10/02/19 7:44:00 CDT  Clinic Follow up, *Est. 04/02/20 7:00:00 CDT, Order for future visit, Vitamin D deficiency, [facility]  Office/Outpatient Visit Level 4 Established 47134 PC, Mixed hyperlipidemia  Normocytic normochromic anemia  Localized osteoarthritis of both knees  BMI 38.0-38.9,adult  Vitamin D deficiency, [facility], 10/02/19 7:43:00 CDT  ?  5.?BMI 38.0-38.9,adult?Z68.38  Handouts given today.  Reviewed goal BMI with her BMI today.? Teaching provided on long-term complications associated with obesity, weight loss measures, increasing physical activity, improving diet, avoiding sugary foods and liquids, avoiding processed and fast foods, and  increasing vegetables and fruits.? Teaching provided on how increased BMI, and adipose tissue, increases systemic inflammation, impairs immune function, and increases risk for DM, HTN, and HLD, which increases risk for MIs, CVAs, renal disease, lower leg amputations, infections, and blindness.??  ?  Teaching provided on health risks associated with high fructose corn syrup, including elevated triglycerides, weight gain, and risks of diabetes, hypertension, and cardiac disease.? Handouts given today.? Instructed on how to read food labels to identify that artificial sweetener; instructed to avoid processed foods containing that chemical.  Ordered:  1160F- Medication reconciliation completed during visit, Mixed hyperlipidemia  Normocytic normochromic anemia  Localized osteoarthritis of both knees  BMI 38.0-38.9,adult  Vitamin D deficiency, [facility], 10/02/19 7:44:00 CDT  Clinic Follow up, *Est. 04/02/20 7:00:00 CDT, Order for future visit, Vitamin D deficiency, [facility]  Office/Outpatient Visit Level 4 Established [id number] PC, Mixed hyperlipidemia  Normocytic normochromic anemia  Localized osteoarthritis of both knees  BMI 38.0-38.9,adult  Vitamin D deficiency, [facility], 10/02/19 7:43:00 CDT  ?  Orders:  3008F Body Mass Index (BMI), documented, 10/02/19 7:43:00 CDT  3074F Most recent systolic blood pressure, less than 130 mm Hg, 10/02/19 7:43:00 CDT  3078F Most recent diastolic blood pressure, less than 80 mm Hg, 10/02/19 7:43:00 CDT  3725F Screening for depression performed, 10/02/19 7:43:00 CDT  Comprehensive Metabolic Panel, Routine collect, *Est. 04/02/20 3:00:00 CDT, Blood, Order for future visit, *Est. Stop date 04/02/20 3:00:00 CDT, Lab Collect, Wellness examination, 10/02/19 7:46:00 CDT  Occult Blood Fecal Immunoassay, Routine collect, Stool, Order for future visit, *Est. 11/02/19 3:00:00 CDT, *Est. Stop date 11/02/19 3:00:00 CDT, Nurse collect, Colon cancer screening  Referrals  Clinic  Follow up, *Est. 04/02/20 7:00:00 CDT, Order for future visit, Vitamin D deficiency, [facility]   Problem List/Past Medical History  Ongoing  Acute pharyngitis  BMI 38.0-38.9,adult  Knowledge deficit  Localized osteoarthritis of both knees  Mixed hyperlipidemia  Normocytic normochromic anemia  Obesity  Vitamin D deficiency  Historical  Pregnant  Pregnant  Pregnant  Pregnant  Vitamin D deficiency  Procedure/Surgical History  None  Tubal ligation   Medications  Lasix 20 mg oral tablet, 20 mg= 1 tab(s), Oral, qM-F, 1 refills  Allergies  No Known Allergies  Social History  Abuse/Neglect  No, No, Yes, 10/02/2019  Alcohol - Denies Alcohol Use, 08/01/2014  Never, 10/02/2019  Employment/School  Employed, Work/School description: [employer]., 03/14/2018  Exercise  Home/Environment  Living situation: Home/Independent. Alcohol abuse in household: No. Substance abuse in household: No. Smoker in household: No. Injuries/Abuse/Neglect in household: No. Feels unsafe at home: No. Safe place to go: Yes. Family/Friends available for support: Yes. Concern for family members at home: No. Major illness in household: No. Financial concerns: No. TV/Computer concerns: No., 03/14/2018  Nutrition/Health  Regular, Caffeine intake amount: coffee occasionally. Wants to lose weight: Yes. Sleeping concerns: No. Feels highly stressed: No., 03/14/2018  Sexual  Sexually active: Yes., 04/10/2019  Substance Use  Never, 02/08/2016  Tobacco - Denies Tobacco Use, 08/01/2014  Never (less than 100 in lifetime), N/A, 10/02/2019  Family History  Diabetes mellitus type 2: Mother and Sister.  Hypertension.: Mother and Sister.  Metastatic cancer: Father.  Health Maintenance  Health Maintenance  ???Pending?(in the next year)  ??? ??OverDue  ??? ? ? ?Diabetes Screening due??and every?  ??? ??Due?  ??? ? ? ?Influenza Vaccine due??10/03/19??and every?  ??? ??Refused?  ??? ? ? ?Tetanus Vaccine due??10/03/19??and every 10??year(s)  ??? ??Due In Future?  ??? ? ?  ?Colorectal Screening not due until??10/04/19??and every 1??year(s)  ??? ? ? ?Alcohol Misuse Screening not due until??01/01/20??and every 1??year(s)  ??? ? ? ?Obesity Screening not due until??01/01/20??and every 1??year(s)  ??? ? ? ?Breast Cancer Screening not due until??09/30/20??and every 2??year(s)  ??? ? ? ?Blood Pressure Screening not due until??10/01/20??and every 1??year(s)  ??? ? ? ?Body Mass Index Check not due until??10/01/20??and every 1??year(s)  ??? ? ? ?Depression Screening not due until??10/01/20??and every 1??year(s)  ??? ? ? ?ADL Screening not due until??10/02/20??and every 1??year(s)  ???Satisfied?(in the past 1 year)  ??? ??Satisfied?  ??? ? ? ?ADL Screening on??10/02/19.??Satisfied by Patria Ashraf LPN  ??? ? ? ?Alcohol Misuse Screening on??10/03/19.??Satisfied by Lola Willson  ??? ? ? ?Blood Pressure Screening on??10/02/19.??Satisfied by Patria Ashraf LPN  ??? ? ? ?Body Mass Index Check on??10/02/19.??Satisfied by Patria Asharf LPN  ??? ? ? ?Colorectal Screening on??10/03/18.??Satisfied by Dori Parrish  ??? ? ? ?Depression Screening on??10/02/19.??Satisfied by Patria Ashraf LPN  ??? ? ? ?Diabetes Screening on??10/02/19.??Satisfied by Jas Turner Jr.  ??? ? ? ?Influenza Vaccine on??10/02/19.??Satisfied by Patria Ashraf LPN??Reason: Expectation Satisfied Elsewhere  ??? ? ? ?Lipid Screening on??10/02/19.??Satisfied by Yue Monge, Jas Perez  ??? ? ? ?Obesity Screening on??10/02/19.??Satisfied by Patria Ashraf LPN  ??? ??Refused?  ??? ? ? ?Tetanus Vaccine on??10/03/19.??Recorded by Lola Willson??Reason: Patient Refuses  ?

## 2022-05-02 NOTE — HISTORICAL [facility] CERNER
This is a historical note converted from [employer]ner. Formatting and pictures may have been removed.  Please reference Cerner for original formatting and attached multimedia. Pt came by my room today and lab results were discussed. Rx sent in for BV.

## 2022-05-25 PROBLEM — D64.9 NORMOCYTIC NORMOCHROMIC ANEMIA: Status: ACTIVE | Noted: 2022-01-01

## 2022-05-25 PROBLEM — R73.03 PREDIABETES: Status: ACTIVE | Noted: 2022-01-01

## 2022-05-25 PROBLEM — M17.0 BILATERAL PRIMARY OSTEOARTHRITIS OF KNEE: Status: ACTIVE | Noted: 2022-01-01

## 2022-05-25 PROBLEM — E55.9 VITAMIN D DEFICIENCY: Status: ACTIVE | Noted: 2022-01-01

## 2022-05-25 PROBLEM — E66.9 OBESITY: Status: ACTIVE | Noted: 2022-01-01

## 2022-05-25 PROBLEM — E78.2 MIXED HYPERLIPIDEMIA: Status: ACTIVE | Noted: 2022-01-01

## 2022-05-25 NOTE — ASSESSMENT & PLAN NOTE
Follow a low cholesterol, low saturated fat diet with less than 200 mg of cholesterol a day.   Avoid fried foods and high saturated fats (robles, sausage, cookies, cakes, chips, cheese, whole milk, butter, mayonnaise, creamy dressings, gravy, stew, gumbo, boudin, cracklins and cream sauces).  Add flax seed or fish oil supplements to diet.   Increase dietary fiber.   Regular exercise improves cholesterol levels.  Physical activity 5 times a week for 30 minutes per day (or 150 minutes per week).   Stressed importance of dietary modifications.

## 2022-05-25 NOTE — ASSESSMENT & PLAN NOTE
Avoid soda, simple sweets, and limit rice/pasta/bread/starches and consume brown options when possible.   Maintain healthy weight with BMI goal <30.   Perform aerobic exercise for 150 minutes per week (or 5 days a week for 30 minutes each day).

## 2022-05-25 NOTE — PROGRESS NOTES
ASHLEY Vogel   [facility]  [address] [address]      PATIENT NAME: Esme Logan  : 1965  DATE: 22  MRN: [id number]      Billing Provider: ASHLEY Vogel  Level of Service:   Patient PCP Information     Provider PCP Type    ASHLEY Vogel General          Reason for Visit / Chief Complaint: Follow-up (Varicose veins bilateral legs )       History of Present Illness / Problem Focused Workflow     Esme Logan presents to the clinic with Follow-up (Varicose veins bilateral legs )     56 yo AAF here today for f/u. PMHx includes prediabetes, HLD, anemia, OA bilateral knees, lumbar spondylosis and DJD, Vitamin D deficiency.  Non-smoker. Followed by [facility] for left knee pain. Pt reports today with some broken blood vessels in her legs that are bothersome at times, pt does not want to go Vascular at this time, labs discussed with no questions or concerns at this time, pt aware of slight increase in her FLP, will work on diet changes. Agreeable to referral to GI for Colon Cancer Screening.     Follow-up        Review of Systems     Review of Systems    Medical / Social / Family History     Past Medical History:   Diagnosis Date    Hyperlipidemia     Knee pain     Menopausal flushing     Normocytic normochromic anemia     Vitamin D deficiency        Past Surgical History:   Procedure Laterality Date    TUBAL LIGATION         Social History  Ms.  reports that she has never smoked. She has never used smokeless tobacco. She reports current alcohol use. She reports that she does not use drugs.    Family History  Ms.'s family history includes Hypertension in her mother.    Medications and Allergies     Medications  Medication List with Changes/Refills   Current Medications    OMEGA-3 FATTY ACIDS/FISH OIL (FISH OIL-OMEGA-3 FATTY ACIDS) 300-1,000 MG CAPSULE    Take by mouth once daily.    VITAMIN D (VITAMIN  D3) 1000 UNITS TAB    Take 5,000 Units by mouth once daily.         Allergies  Review of patient's allergies indicates:  No Known Allergies    Physical Examination     Vitals:    05/25/22 0801   BP: 115/79   Pulse: 65   Temp: 98.4 °F (36.9 °C)     Physical Exam      Results     Lab Results   Component Value Date    WBC 3.8 (L) 05/13/2022    RBC 4.41 05/13/2022    HGB 12.3 05/13/2022    HCT 40.3 05/13/2022    MCV 91.4 05/13/2022    MCH 27.9 05/13/2022    MCHC 30.5 (L) 05/13/2022    RDW 12.9 05/13/2022     05/13/2022    MPV 12.1 05/13/2022     Sodium Level   Date Value Ref Range Status   05/13/2022 142 136 - 145 mmol/L Final     Potassium Level   Date Value Ref Range Status   05/13/2022 4.1 3.5 - 5.1 mmol/L Final     Carbon Dioxide   Date Value Ref Range Status   05/13/2022 28 22 - 29 mmol/L Final     Blood Urea Nitrogen   Date Value Ref Range Status   05/13/2022 13.0 9.8 - 20.1 mg/dL Final     Creatinine   Date Value Ref Range Status   05/13/2022 0.80 0.55 - 1.02 mg/dL Final     Calcium Level Total   Date Value Ref Range Status   05/13/2022 10.2 8.4 - 10.2 mg/dL Final     Albumin Level   Date Value Ref Range Status   05/13/2022 4.0 3.5 - 5.0 gm/dL Final     Bilirubin Total   Date Value Ref Range Status   05/13/2022 0.5 <=1.5 mg/dL Final     Alkaline Phosphatase   Date Value Ref Range Status   05/13/2022 77 40 - 150 unit/L Final     Aspartate Aminotransferase   Date Value Ref Range Status   05/13/2022 12 5 - 34 unit/L Final     Alanine Aminotransferase   Date Value Ref Range Status   05/13/2022 12 0 - 55 unit/L Final     Estimated GFR-Non    Date Value Ref Range Status   05/14/2021 79 (L) >>=90 mL/min/1.73 m2 Final     Lab Results   Component Value Date    CHOL 240 (H) 05/13/2022     Lab Results   Component Value Date    HDL 80 (H) 05/13/2022     No results found for: LDLCALC  Lab Results   Component Value Date    TRIG 69 05/13/2022     No results found for: CHOLHDL  Lab Results   Component  Value Date    TSH 1.4115 05/13/2022     Lab Results   Component Value Date    PHUR 6.0 10/02/2019    PROTEINUA Negative 05/13/2022    GLUCUA Normal 10/02/2019    KETONESU Negative 10/02/2019    OCCULTUA Negative 10/02/2019    NITRITE Negative 10/02/2019    LEUKOCYTESUR 25  (A) 05/13/2022     Lab Results   Component Value Date    HGBA1C 5.6 05/14/2021    HGBA1C 5.6 11/18/2020    HGBA1C 5.9 10/02/2019     No results found for: MICROALBUR, [id number]   No results found for this or any previous visit.         Assessment and Plan (including Health Maintenance)     Plan:         Health Maintenance Due   Topic Date Due    Hepatitis C Screening  Never done    Cervical Cancer Screening  Never done    HIV Screening  Never done    Colorectal Cancer Screening  Never done    Shingles Vaccine (1 of 2) Never done    COVID-19 Vaccine (3 - Booster for Pfizer series) 09/16/2021       Problem List Items Addressed This Visit        Endocrine    Vitamin D deficiency    Current Assessment & Plan     OTC Vitamin D 2000 - 5000 units daily + Calcium.            Relevant Orders    Vitamin D    Prediabetes - Primary    Current Assessment & Plan     Avoid soda, simple sweets, and limit rice/pasta/bread/starches and consume brown options when possible.   Maintain healthy weight with BMI goal <30.   Perform aerobic exercise for 150 minutes per week (or 5 days a week for 30 minutes each day).              Relevant Orders    POCT HEMOGLOBIN A1C    Comprehensive Metabolic Panel    Lipid Panel    Hemoglobin A1C      Other Visit Diagnoses     Wellness examination        Relevant Orders    CBC Auto Differential    TSH    T4, Free    Urinalysis, Reflex to Urine Culture          Health Maintenance Topics with due status: Not Due       Topic Last Completion Date    TETANUS VACCINE 05/20/2021    Mammogram 12/22/2021    Lipid Panel 05/13/2022       Future Appointments   Date Time Provider Department Center   6/21/2022  9:10 AM ASHLEY Gallego  [facility] [place]            Signature:     [facility]  [address] [facility] [address]    Date of encounter: 5/25/22

## 2022-10-11 NOTE — ED TRIAGE NOTES
"Pt relates that [place] 10[place]22 and fractured her "left medial tibial plateau" with instructions to follow up at arrival to home. Pt wearing a knee brace at arrival to ed with complaint of pain to fx area  "

## 2022-10-11 NOTE — ED PROVIDER NOTES
"Encounter Date: 10/11/2022       History     Chief Complaint   Patient presents with    Knee Injury     Pt relates that she fell on a [employer] ship 10/3 and fractured her "left medial tibial plateau" with instructions to follow up at arrival to home. Pt wearing a knee brace at arrival to ed with complaint of pain to fx area       56-year-old female who had a slip and fall on 10/3/22  on a cruise ship and had an x-ray which showed a subtle tibial plateau fracture of the left knee presents for follow-up.  Patient was placed in knee immobilizer and got back from the cruise yesterday.  Patient was given naproxen which is not helping with her pain.   No other complaints.    Review of patient's allergies indicates:  No Known Allergies  Past Medical History:   Diagnosis Date    Hyperlipidemia     Knee pain     Menopausal flushing     Normocytic normochromic anemia     Vitamin D deficiency      Past Surgical History:   Procedure Laterality Date    TUBAL LIGATION       Family History   Problem Relation Age of Onset    Hypertension Mother      Social History     Tobacco Use    Smoking status: Never    Smokeless tobacco: Never   Substance Use Topics    Alcohol use: Yes     Comment: occassionally    Drug use: Never     Review of Systems   Constitutional: Negative.    HENT: Negative.     Eyes: Negative.    Respiratory: Negative.     Cardiovascular: Negative.    Gastrointestinal: Negative.    Endocrine: Negative.    Genitourinary: Negative.    Musculoskeletal:  Positive for arthralgias.   Skin: Negative.    Allergic/Immunologic: Negative.    Neurological: Negative.    Hematological: Negative.    Psychiatric/Behavioral: Negative.       Physical Exam     Initial Vitals [10/11/22 1117]   BP Pulse Resp Temp SpO2   (!) 135/51 83 18 98.6 °F (37 °C) 99 %      MAP       --         Physical Exam    Nursing note and vitals reviewed.  Constitutional: She appears well-developed and well-nourished.   HENT:   Head: Normocephalic and atraumatic. "   Eyes: Conjunctivae and EOM are normal. Pupils are equal, round, and reactive to light.   Neck: Neck supple.   Normal range of motion.  Cardiovascular:  Normal rate, regular rhythm and intact distal pulses.           Pulmonary/Chest: Breath sounds normal.   Abdominal: Abdomen is soft.   Musculoskeletal:      Cervical back: Normal range of motion and neck supple.      Comments: Left knee - no erythema or effusion.  No abrasions or signs of trauma.  Mildly tender over the anterior knee diffusely.  Limited range of motion secondary to pain.  Sensation and circulation intact throughout.     Neurological: She is alert and oriented to person, place, and time. She has normal strength. GCS score is 15. GCS eye subscore is 4. GCS verbal subscore is 5. GCS motor subscore is 6.   Skin: Skin is warm. Capillary refill takes less than 2 seconds.   Psychiatric: She has a normal mood and affect.       ED Course   Procedures  Labs Reviewed - No data to display       Imaging Results              CT Knee Without Contrast Left (Final result)  Result time 10/11/22 14:18:52      Final result by Ton Ruiz MD (10/11/22 14:18:52)                   Impression:      No acute osseous process appreciated.      Electronically signed by: Ton Ruiz  Date:    10/11/2022  Time:    14:18               Narrative:    EXAMINATION:  CT KNEE WITHOUT CONTRAST LEFT    CLINICAL HISTORY:  Fracture, knee;    TECHNIQUE:  Noncontrast CT imaging of the left knee.  Axial, coronal and sagittal reformatted images reviewed.   Dose length product is 355 mGycm. Automatic exposure control, adjustment of mA/kV or iterative reconstruction technique used to limit radiation dose.    COMPARISON:  Radiographs 03/16/2022    FINDINGS:  No acute fracture identified.  Joint alignments are maintained with underlying tricompartment degenerative changes.  No significant knee effusion.                                       Medications - No data to display  Medical  Decision Making:   Clinical Tests:   Radiological Study: Ordered and Reviewed  ED Management:  Encouraged patient to call follow-up with orthopedist as soon as possible for further evaluation.  Strict return to ER precautions given, patient voiced understanding.                        Clinical Impression:   Final diagnoses:  [M17.12] Tricompartment osteoarthritis of left knee (Primary)      ED Disposition Condition    Discharge Stable          ED Prescriptions       Medication Sig Dispense Start Date End Date Auth. Provider    HYDROcodone-acetaminophen (NORCO) 7.5-325 mg per tablet  (Status: Discontinued) Take 1 tablet by mouth every 6 (six) hours as needed for Pain. 12 tablet 10/11/2022 10/11/2022 Phu Jaimes MD    HYDROcodone-acetaminophen (NORCO) 7.5-325 mg per tablet (Expires today) Take 1 tablet by mouth every 6 (six) hours as needed for Pain. 12 tablet 10/11/2022 10/14/2022 Phu Jaimes MD          Follow-up Information       Follow up With Specialties Details Why Contact Info    ASHLEY Vogel Nurse Practitioner   [address] [facility] [address] [phone]               Phu Jaimes MD  10/14/22 6985

## 2022-10-11 NOTE — ED NOTES
Here for follow up/establish care after standing height slip/trip fall on 10/3 while on cruise ship. Dx with fx of lt medial tibial plateau. Crutch walking and knee immobilizer in use since injury. No prior knee issues reported.

## 2022-10-17 PROBLEM — M17.12 PRIMARY OSTEOARTHRITIS OF LEFT KNEE: Status: ACTIVE | Noted: 2022-01-01

## 2022-10-17 NOTE — PROGRESS NOTES
Telemedicine Consent  The patient location is: Home  The chief complaint leading to consultation is: left knee  Visit type: Audio only.  Attempt made for video TM visit but was unsuccessful due to technical issues.  30 minutes of total time spent on the encounter, which includes face-to-face time and non-face-to-face time preparing to see the patient (eg. review of tests), obtaining and/or reviewing separately obtained history, documenting clinical information in the electronic or other health record, independently interpreting results (not separately reported) and communicating results to the patient/family/caregiver or care coordination (not separately reported).   I have reviewed patient's name,  and picture ID if applicable.  I discussed with patient regarding medical services by telemedicine (1) informed of the relationship between the physician and patient and the respective role of any other health care provider with respect to management of the patient (2) session are not recorded and personal health information is protected; and (3) notified that he or she may decline to receive medical services by telemedicine and may withdraw from such care at any time.  Patient consented to consult by telemedicine.     Subjective:       Follow up .  Patient ID: Esme Marie is a 56 y.o. female. Non-smoker. Employment HX: [employer], currently employed.    Seen [facility] ortho for same DX since 2019.   Chief Complaint: Pain of the Left Knee    HPI:    Left aching medial knee pain.   Injury: no known injury  Onset: several years ago fluctuates   Modifying Factors: worse with activity, improves with rest, stiffness after immobilization, and improves with less than 30 minutes activity  Associated Symptoms: crepitus, decreased ROM, and swelling   Activity: sedentary with light activity and pain mildly interferes with ADLs   Previous Treatments:  BMI reduction ongoing education, soft knee splint, HEP withTheraBand, RX NSAIDs, and  "CSI since 2019 last injection 3/16/22  with some relief but symptoms returning  PMH: negative for contraindications for NSAID use  Family History: + OA    Note: CSI given 3/16/22 with great relief, but symptoms returning recently.  Requesting CSI today due to affecting ADLs.     Current Outpatient Medications:     omega-3 fatty acids/fish oil (FISH OIL-OMEGA-3 FATTY ACIDS) 300-1,000 mg capsule, Take by mouth once daily., Disp: , Rfl:     vitamin D (VITAMIN D3) 1000 units Tab, Take 5,000 Units by mouth once daily., Disp: , Rfl:   Review of patient's allergies indicates:  No Known Allergies  Hemoglobin A1C   Date Value Ref Range Status   05/25/2022 5.7 % % Final     Hemoglobin A1c   Date Value Ref Range Status   05/14/2021 5.6 <<=7.0 % Final   11/18/2020 5.6 <<=7.0 % Final   10/02/2019 5.9 4.2 - 6.3 % Final      Body mass index is 35.51 kg/m².   Vitals:    10/17/22 1009   Weight: 99.8 kg (220 lb)   Height: 5' 6" (1.676 m)   PainSc:   7      Review of Systems:  A ten-point review of systems was performed and is negative, except as mentioned above   Objective:   N/a telemedicine visit  Assessment:       Imaging: X-ray 4 views of left knee dated 3/16/22 reviewed and independently interpreted by me.  Discussed with patient. Noted no acute, DJD noted.    XR Knee Left 4 or More Views 3/16/22  HISTORY: Pain  COMPARISON: None.  FINDINGS:  No acute displaced fractures or dislocations.  There is some narrowing of the medial compartment of the knee joint  articular spaces are otherwise preserved with smooth articular  surfaces and degenerative changes also identified of the lateral  compartment  No blastic or lytic lesions.  Soft tissues within normal limits.  IMPRESSION:  Degenerative changes    EMR Review: completed with noted prior visit 3/16/22 reviewed.    1. Primary osteoarthritis of left knee    2. BMI 35.0-35.9,adult      Plan:       Ongoing education about DX and treatment recommendations including conservative " treatments of daily HEP with TheraBand, BMI reduction goal 5-10% of body weight (180# overall goal), muscle strengthening with use of stationary bike (RPM set at 80 or > with slow progression to goal of 40 minutes 3-4 times per week as tolerated), adequate vit D/C, glucosamine 1500 mg/day and daily acetaminophen 1000 mg 3 times/ day if able to tolerate.    Treatment plan: Moderate-to-severe knee arthritis Left Currently having adequate relief with current treatment plan. Intra-articular injections today due to return of symptoms since last injection which are impacting ADLs.    Procedure: CSI v. VS injections discussed, s/t failed conservative treatments patient consents to CSI today  RX Medications: continue medications as RX per PCP  RTC: next available procedure only visit   NOTE: None    Bailee RAMIREZ    Timed Billing Note  Total Time Spent with Patient: 30 minutes  Visit Start Time: 1300  5 minutes spent prior to visit reviewing EMR, prior labs and x-rays.  15 minutes spent in audio only visit with patient for medical discussion, obtaining history, educating on DX and treatment plan.  10 minutes spent after visit completing EMR documentation.   Visit End Time: 1330

## 2022-10-21 NOTE — PROCEDURES
Ortho Procedure Note   Procedure: Left lateral suprapatellar approach knee CSI  Date: 10/21/2022  Time:  7:50 AM CDT   Indications: Therapeutic Indication - Decrease pain, Increase range of motion and improve quality of life:   Risks:  Possible complications with the injection include bleeding, infection (.01%), tendon rupture, steroid flare, fat pad or soft tissue atrophy, skin depigmentation, allergic reaction to medications and vasovagal response. (steroid flare treatment is rest, ice, NSAIDs and resolves in 24-36 hours)  Consent:  Procedure, risks, benefits, and alternatives explained the patient, who voiced understanding and agreed to proceed with procedure.  Consent signed and scanned into the medical record.   No absolute contraindications (cellulitis overlying joint, infection, lack of informed consent, allergy to injection medication, AVN protein or egg allergy for sodium hyaluronate, or history of steroid flare) or relative contraindications (uncontrolled DM2 A1c>10, coagulopathy, INR > 3.5, previous joint replacement or history of AVN).        Staff: Bailee Hill FNP  Description:  Time-out performed.  The patient was prepped in normal sterile fashion use of chlorhexidine scrub and the appropriate and anatomic landmarks were identified.  Sterile 21 gauge 1.5 inch  was used. Topical ethyl chloride was applied. Contents of syringe included: 4ml of 1% of lidocaine (PF) with 40mg of Kenalog  Drainage: n/a  Complications: None   EBL: None   Post Procedure: Patient alert, and moving all extremities. ROM improved, pain decreased.  Good peripheral pulses, no signs of vascular compromise and range of motion intact.  Aftercare instructions were given to patient at time of discharge.  Relative rest for 3 days-avoiding excess activity.  Place ice on the area for 15 minutes every 4-6 hours. Patient may take Tylenol a 1000 mg b.i.d. or ibuprofen 600 mg t.i.d. for the next 3-4 days if not on medication already  and safe to take pending co-morbidities.  Protect the area for the next 1-8 hours if anesthetic was used.  Avoid excessive activity for the next 3-4 weeks.  ER precautions given for fever, severe joint pain or allergic reaction or other new symptoms related to the joint injection.

## 2022-12-06 NOTE — PROGRESS NOTES
"Patient ID: Esme Marie is a 57 y.o. female.    Chief Complaint: Well Woman      Review of patient's allergies indicates:  No Known Allergies      Past Medical History:   Diagnosis Date    Hyperlipidemia     Knee pain     Menopausal flushing     Normocytic normochromic anemia     Vitamin D deficiency             HPI:  The patient is  here for annual gyn exam. Denies history of abnormal pap smear.  Her LMP was in her early 50s. Denies vaginal bleeding/discharge. Denies abd/pelvic pain. Denies breast or urinary complaints.    Review of Systems:   Negative except for findings in HPI     Objective:   /74   Pulse 68   Temp 97.8 °F (36.6 °C)   Resp 16   Ht 5' 6" (1.676 m)   Wt 104.3 kg (230 lb)   SpO2 100%   BMI 37.12 kg/m²    Physical Exam:  GENERAL: Pt is aware and alert and  in no acute distress.  BREASTS: Bilateral-No masses, nipple discharge, skin changes, tenderness.  ABDOMEN: Soft, non tender.  VULVA:  No lesions or skin changes.  URETHRA: No lesions  BLADDER: No tenderness.  VAGINA: Mucosa normal,no discharge or lesions.  CERVIX:  no CMT, NO discharge; NO lesions  BIMANUAL EXAM:  The uterus is mobile, nontender, no palpable masses. Tato adnexa reveal no evidence of masses; no fullness   SKIN: Warm and Dry  PSYCHIATRIC: Patient is awake and alert. Mood and affect are normal.    Assessment:   Women's annual routine gynecological examination  -     Liquid-Based Pap Smear, Screening Screening    Colon cancer screening  -     Cologuard Screening (Multitarget Stool DNA); Future; Expected date: 2022          1. Women's annual routine gynecological examination    2. Colon cancer screening             -pap/hpv  -Contact clinic with any vaginal bleeding as this would be abnormal in postmenopausal pt. Encouraged dietary or supplemental intake of calcium 600mg +Vit D 400 IU twice daily for osteoporosis prevention.    -mammogram already scheduled by pcp  Plan:       Follow up in about 1 year (around " 12/6/2023).

## 2022-12-20 PROBLEM — N61.0 CELLULITIS OF RIGHT BREAST: Status: ACTIVE | Noted: 2022-01-01

## 2022-12-20 PROBLEM — R59.0 AXILLARY ADENOPATHY: Status: ACTIVE | Noted: 2022-01-01

## 2022-12-20 PROBLEM — N64.4 BREAST PAIN, LEFT: Status: ACTIVE | Noted: 2022-01-01

## 2022-12-20 NOTE — TELEPHONE ENCOUNTER
Please inform pt that her cologuard test (colon cancer screening) was negative. We will repeat in 3 years.

## 2022-12-20 NOTE — PROGRESS NOTES
"  ASHLEY Vogel   [facility]  [address] [facility] [address]      PATIENT NAME: Esme Marie  : 1965  DATE: 22  MRN: [id number]      Patient PCP Information       Provider PCP Type    ASHLEY Vogel General            Reason for Visit / Chief Complaint: Follow-up (Right breast /armpit swollen)       History of Present Illness / Problem Focused Workflow     Esme Marie presents to the clinic with Follow-up (Right breast /armpit swollen)     56 yo AAF here today for f/u. PMHx includes prediabetes, HLD, anemia, OA bilateral knees, lumbar spondylosis and DJD, Vitamin D deficiency.  Non-smoker. [facility]ed by [facility] for [facility] pain.     Cervical Cancer Screening: [facility] GYN  Breast Cancer Screenin22 [facility]  Osteoporosis Screening:  HCV Screenin/20/22  Pt here today with c/o right breast discomfort & swelling & a "knot" under her right axillary; also a small palpable "nodule" to the right breast around 9:00-11:00.  Reports it started on 22. Pt has a Screening [facility] scheduled for 22. PE noted palpable mass to the right axillary that was tender to palpation, comparison of right breast vs left breast noted the Right breast is swollen, tender, warm, some erythema noted as well, no nipple discharge. Left breast is normal. VSS. Spoke with Yasmine in Breast Imaging, will order [facility] Diagnostic and right breast US. Pt agreeable to cellulitis treatment today to include oral antibiotics and Rocephin IM today in clinic, then x x 2 more days in clinic for Rocephin IM. UCC precautions discussed as well.   Denies chest pain, shortness of breath, cough, fever, headache, dizziness, weakness, abdominal pain, nausea, vomiting, diarrhea, constipation, black/bloody stools, unplanned weight loss, night sweats, changes in urinary patterns, burning/odor with urination, depression, anxiety, and SI/HI.       Follow-up      Review " of Systems     Review of Systems   Constitutional: Negative.    HENT: Negative.     Eyes: Negative.    Respiratory: Negative.     Cardiovascular: Negative.    Gastrointestinal: Negative.    Endocrine: Negative.    Genitourinary: Negative.    Neurological: Negative.    Psychiatric/Behavioral: Negative.           Medications and Allergies     Medications  Medication List with Changes/Refills   New Medications    CLINDAMYCIN (CLEOCIN) 300 MG CAPSULE    Take 1 capsule (300 mg total) by mouth every 8 (eight) hours. With Food. for 10 days   Current Medications    OMEGA-3 FATTY ACIDS/FISH OIL (FISH OIL-OMEGA-3 FATTY ACIDS) 300-1,000 MG CAPSULE    Take by mouth once daily.    VITAMIN D (VITAMIN D3) 1000 UNITS TAB    Take 5,000 Units by mouth once daily.        Allergies  Review of patient's allergies indicates:  No Known Allergies    Physical Examination     Vitals:    12/20/22 0723   BP: 139/81   Resp: 16   Temp: 98 °F (36.7 °C)     Physical Exam  Vitals reviewed.   Constitutional:       Appearance: Normal appearance. She is normal weight.   HENT:      Head: Normocephalic.   Cardiovascular:      Rate and Rhythm: Normal rate and regular rhythm.      Pulses: Normal pulses.      Heart sounds: Normal heart sounds.   Pulmonary:      Effort: Pulmonary effort is normal.      Breath sounds: Normal breath sounds.   Chest:      Chest wall: Mass, swelling and edema present.   Breasts:     Breasts are asymmetrical.      Right: Swelling and tenderness present.       Abdominal:      General: Abdomen is flat.      Palpations: Abdomen is soft.   Musculoskeletal:         General: Normal range of motion.      Cervical back: Normal range of motion.   Lymphadenopathy:      Upper Body:      Right upper body: Axillary adenopathy present.   Skin:     General: Skin is warm and dry.   Neurological:      Mental Status: She is alert.   Psychiatric:         Mood and Affect: Mood normal.         Results           Assessment and Plan (including Health  Maintenance)     Plan:       Problem List Items Addressed This Visit          Renal/    Breast pain, left - Primary    Current Assessment & Plan     [facility] Diagnostic  US Right Breast Limited          Relevant Orders    US Breast Right Limited    Mammo Digital Diagnostic Bilat with Evaristo    Cellulitis of right breast    Current Assessment & Plan     RX Clindamycin 300 mg TID x 10 days  IM Rocephin IM x 1 today , then x 2  UCC Precautions          Relevant Medications    LIDOcaine HCL 10 mg/ml (1%) injection 2.1 mL (Completed)       Other    Axillary adenopathy    Current Assessment & Plan     US          Relevant Medications    clindamycin (CLEOCIN) 300 MG capsule    cefTRIAXone injection 1 g (Completed)    sterile water for injection injection 2.1 mL         Future Appointments   Date Time Provider Department Center   12/21/2022  8:00 AM NURSE, [facility]    12/29/2022 10:30 AM [facility] MAMMO1 [facility] MAMMO [place]    12/29/2022 11:00 AM [facility] MAMMO3 US1 [facility] MAMMO [place]    5/24/2023  7:15 AM ASHLEY Vogel [facility] INTMED [place]    12/7/2023  8:10 AM ASHLEY Gallego [facility] GYN [place] Un              Signature:     [facility]  [address] [facility] [address]    Date of encounter: 12/20/22  I spent a total of 43 minutes on the day of the visit.  This includes face to face time and non-face to face time preparing to see the patient (eg, review of tests), obtaining and/or reviewing separately obtained history, documenting clinical information in the electronic or other health record, independently interpreting results and communicating results to the patient/family/caregiver, or care coordinator.

## 2022-12-21 NOTE — PROGRESS NOTES
Patient presented to [facility] for #2 Rocephin 1.0 gram injection.Patient has a history of Right breast cellulitis. Rocephin 1.0 gm diluted with 2.1 ml of lidocaine then administered IM to right gluteus medius using aseptic technique . Patient tolerated procedure well with no adverse reactions noted. Patient will return in the morning , 12/22/2022 at 0800 am for # 3 dose of Rocephin 1 gm injection. Call if need [phone].

## 2022-12-22 NOTE — PROGRESS NOTES
Pt. Presented to [facility] clinic for #3 Rocephin 1.0gm injection.Pt. has a history of Rt. Breast Cellulitis. Rocephin 1.0gm diluated with 2.1 ml of Lidocaine then administered in Left Gluteus Medius using aseptic technique. Pt. Tolerated procedure well with no adversed reactions noted.

## 2022-12-27 NOTE — TELEPHONE ENCOUNTER
----- Message from Josephine Bridges MA sent at 12/27/2022  9:49 AM CST -----  Regarding: FW: change to diagnostic mammogram order    ----- Message -----  From: RT Beatriz  Sent: 12/27/2022   9:46 AM CST  To: Yifan MONTELONGO Staff  Subject: change to diagnostic mammogram order             Esme Marie is scheduled for diagnostic mammogram Thursday, 12/29. The mammogram order reads left, and the ultrasound order reads right. All visit notes speak of abnormalities in the right breast. Could you please send a corrected order for a diagnostic mammogram of her right breast?  Thanks.

## 2023-01-01 ENCOUNTER — OFFICE VISIT (OUTPATIENT)
Dept: HEMATOLOGY/ONCOLOGY | Facility: CLINIC | Age: 58
End: 2023-01-01
Payer: COMMERCIAL

## 2023-01-01 ENCOUNTER — ANESTHESIA (OUTPATIENT)
Dept: SURGERY | Facility: HOSPITAL | Age: 58
End: 2023-01-01
Payer: COMMERCIAL

## 2023-01-01 ENCOUNTER — TELEPHONE (OUTPATIENT)
Dept: RADIOLOGY | Facility: HOSPITAL | Age: 58
End: 2023-01-01
Payer: COMMERCIAL

## 2023-01-01 ENCOUNTER — TELEPHONE (OUTPATIENT)
Dept: HEMATOLOGY/ONCOLOGY | Facility: CLINIC | Age: 58
End: 2023-01-01
Payer: COMMERCIAL

## 2023-01-01 ENCOUNTER — APPOINTMENT (OUTPATIENT)
Dept: RADIOLOGY | Facility: HOSPITAL | Age: 58
End: 2023-01-01
Attending: STUDENT IN AN ORGANIZED HEALTH CARE EDUCATION/TRAINING PROGRAM
Payer: COMMERCIAL

## 2023-01-01 ENCOUNTER — TELEPHONE (OUTPATIENT)
Dept: HEMATOLOGY/ONCOLOGY | Facility: CLINIC | Age: 58
End: 2023-01-01

## 2023-01-01 ENCOUNTER — OFFICE VISIT (OUTPATIENT)
Dept: INTERNAL MEDICINE | Facility: CLINIC | Age: 58
End: 2023-01-01
Payer: COMMERCIAL

## 2023-01-01 ENCOUNTER — HOSPITAL ENCOUNTER (OUTPATIENT)
Dept: RADIOLOGY | Facility: HOSPITAL | Age: 58
Discharge: HOME | End: 2023-04-11
Attending: INTERNAL MEDICINE
Payer: COMMERCIAL

## 2023-01-01 ENCOUNTER — OFFICE VISIT (OUTPATIENT)
Dept: SURGERY | Facility: CLINIC | Age: 58
End: 2023-01-01
Payer: COMMERCIAL

## 2023-01-01 ENCOUNTER — LAB VISIT (OUTPATIENT)
Dept: LAB | Facility: HOSPITAL | Age: 58
End: 2023-01-01
Attending: STUDENT IN AN ORGANIZED HEALTH CARE EDUCATION/TRAINING PROGRAM
Payer: COMMERCIAL

## 2023-01-01 ENCOUNTER — CLINICAL SUPPORT (OUTPATIENT)
Dept: HEMATOLOGY/ONCOLOGY | Facility: CLINIC | Age: 58
End: 2023-01-01
Payer: COMMERCIAL

## 2023-01-01 ENCOUNTER — DOCUMENTATION ONLY (OUTPATIENT)
Dept: HEMATOLOGY/ONCOLOGY | Facility: CLINIC | Age: 58
End: 2023-01-01
Payer: COMMERCIAL

## 2023-01-01 ENCOUNTER — HOSPITAL ENCOUNTER (EMERGENCY)
Facility: HOSPITAL | Age: 58
Discharge: HOME | End: 2023-01-04
Attending: STUDENT IN AN ORGANIZED HEALTH CARE EDUCATION/TRAINING PROGRAM
Payer: COMMERCIAL

## 2023-01-01 ENCOUNTER — HOSPITAL ENCOUNTER (INPATIENT)
Facility: HOSPITAL | Age: 58
LOS: 4 days | Discharge: HOME | DRG: 054 | End: 2023-03-27
Attending: STUDENT IN AN ORGANIZED HEALTH CARE EDUCATION/TRAINING PROGRAM | Admitting: INTERNAL MEDICINE
Payer: COMMERCIAL

## 2023-01-01 ENCOUNTER — ANESTHESIA EVENT (OUTPATIENT)
Dept: SURGERY | Facility: HOSPITAL | Age: 58
End: 2023-01-01
Payer: COMMERCIAL

## 2023-01-01 ENCOUNTER — HOSPITAL ENCOUNTER (OUTPATIENT)
Dept: CARDIOLOGY | Facility: HOSPITAL | Age: 58
Discharge: HOME | End: 2023-01-18
Attending: STUDENT IN AN ORGANIZED HEALTH CARE EDUCATION/TRAINING PROGRAM
Payer: COMMERCIAL

## 2023-01-01 ENCOUNTER — LAB VISIT (OUTPATIENT)
Dept: LAB | Facility: HOSPITAL | Age: 58
End: 2023-01-01
Payer: COMMERCIAL

## 2023-01-01 ENCOUNTER — INFUSION (OUTPATIENT)
Dept: INFUSION THERAPY | Facility: HOSPITAL | Age: 58
End: 2023-01-01
Attending: NURSE PRACTITIONER
Payer: COMMERCIAL

## 2023-01-01 ENCOUNTER — OFFICE VISIT (OUTPATIENT)
Dept: RADIATION THERAPY | Facility: HOSPITAL | Age: 58
End: 2023-01-01
Payer: COMMERCIAL

## 2023-01-01 ENCOUNTER — PATIENT MESSAGE (OUTPATIENT)
Dept: HEMATOLOGY/ONCOLOGY | Facility: CLINIC | Age: 58
End: 2023-01-01
Payer: COMMERCIAL

## 2023-01-01 ENCOUNTER — PATIENT MESSAGE (OUTPATIENT)
Dept: HEMATOLOGY/ONCOLOGY | Facility: CLINIC | Age: 58
End: 2023-01-01

## 2023-01-01 ENCOUNTER — PATIENT OUTREACH (OUTPATIENT)
Dept: ADMINISTRATIVE | Facility: CLINIC | Age: 58
End: 2023-01-01
Payer: COMMERCIAL

## 2023-01-01 ENCOUNTER — APPOINTMENT (OUTPATIENT)
Dept: RADIATION THERAPY | Facility: HOSPITAL | Age: 58
End: 2023-01-01
Attending: RADIOLOGY
Payer: COMMERCIAL

## 2023-01-01 ENCOUNTER — HOSPITAL ENCOUNTER (OUTPATIENT)
Dept: RADIOLOGY | Facility: HOSPITAL | Age: 58
Discharge: HOME | End: 2023-04-10
Attending: HOSPITALIST
Payer: COMMERCIAL

## 2023-01-01 ENCOUNTER — HOSPITAL ENCOUNTER (OUTPATIENT)
Dept: CARDIOLOGY | Facility: HOSPITAL | Age: 58
Discharge: HOME | End: 2023-03-15
Attending: STUDENT IN AN ORGANIZED HEALTH CARE EDUCATION/TRAINING PROGRAM
Payer: COMMERCIAL

## 2023-01-01 ENCOUNTER — TELEPHONE (OUTPATIENT)
Dept: SURGERY | Facility: CLINIC | Age: 58
End: 2023-01-01
Payer: COMMERCIAL

## 2023-01-01 ENCOUNTER — TELEPHONE (OUTPATIENT)
Dept: INTERNAL MEDICINE | Facility: CLINIC | Age: 58
End: 2023-01-01
Payer: COMMERCIAL

## 2023-01-01 ENCOUNTER — HOSPITAL ENCOUNTER (INPATIENT)
Facility: HOSPITAL | Age: 58
LOS: 6 days | Discharge: HOSPICE | DRG: 193 | End: 2023-04-13
Attending: INTERNAL MEDICINE | Admitting: INTERNAL MEDICINE
Payer: COMMERCIAL

## 2023-01-01 ENCOUNTER — APPOINTMENT (OUTPATIENT)
Dept: RADIOLOGY | Facility: HOSPITAL | Age: 58
End: 2023-01-01
Attending: SURGERY
Payer: COMMERCIAL

## 2023-01-01 ENCOUNTER — HOSPITAL ENCOUNTER (OUTPATIENT)
Dept: RADIOLOGY | Facility: HOSPITAL | Age: 58
Discharge: HOME | End: 2023-01-05
Attending: NURSE PRACTITIONER
Payer: COMMERCIAL

## 2023-01-01 ENCOUNTER — HOSPITAL ENCOUNTER (OUTPATIENT)
Dept: RADIOLOGY | Facility: HOSPITAL | Age: 58
Discharge: HOME | End: 2023-01-26
Attending: NURSE PRACTITIONER
Payer: COMMERCIAL

## 2023-01-01 ENCOUNTER — LAB VISIT (OUTPATIENT)
Dept: LAB | Facility: HOSPITAL | Age: 58
End: 2023-01-01
Attending: SURGERY
Payer: COMMERCIAL

## 2023-01-01 ENCOUNTER — HOSPITAL ENCOUNTER (OUTPATIENT)
Facility: HOSPITAL | Age: 58
Discharge: HOME | End: 2023-01-23
Attending: SURGERY | Admitting: SURGERY
Payer: COMMERCIAL

## 2023-01-01 VITALS
BODY MASS INDEX: 41.97 KG/M2 | SYSTOLIC BLOOD PRESSURE: 118 MMHG | HEIGHT: 64 IN | DIASTOLIC BLOOD PRESSURE: 60 MMHG | OXYGEN SATURATION: 97 % | WEIGHT: 236.31 LBS | HEART RATE: 102 BPM | WEIGHT: 244.31 LBS | BODY MASS INDEX: 40.34 KG/M2 | TEMPERATURE: 98 F | RESPIRATION RATE: 18 BRPM

## 2023-01-01 VITALS
DIASTOLIC BLOOD PRESSURE: 85 MMHG | OXYGEN SATURATION: 100 % | OXYGEN SATURATION: 100 % | RESPIRATION RATE: 20 BRPM | SYSTOLIC BLOOD PRESSURE: 112 MMHG | DIASTOLIC BLOOD PRESSURE: 82 MMHG | DIASTOLIC BLOOD PRESSURE: 73 MMHG | HEIGHT: 65 IN | WEIGHT: 230.19 LBS | HEART RATE: 104 BPM | SYSTOLIC BLOOD PRESSURE: 126 MMHG | SYSTOLIC BLOOD PRESSURE: 140 MMHG | HEIGHT: 65 IN | HEART RATE: 84 BPM | BODY MASS INDEX: 37.12 KG/M2 | RESPIRATION RATE: 18 BRPM | WEIGHT: 222.81 LBS | HEIGHT: 65 IN | HEART RATE: 87 BPM | BODY MASS INDEX: 38.35 KG/M2 | WEIGHT: 222.81 LBS | BODY MASS INDEX: 37.12 KG/M2 | RESPIRATION RATE: 18 BRPM | TEMPERATURE: 98 F | OXYGEN SATURATION: 100 % | TEMPERATURE: 98 F | TEMPERATURE: 98 F

## 2023-01-01 VITALS
OXYGEN SATURATION: 100 % | RESPIRATION RATE: 18 BRPM | TEMPERATURE: 98 F | BODY MASS INDEX: 38.72 KG/M2 | OXYGEN SATURATION: 100 % | WEIGHT: 232.38 LBS | SYSTOLIC BLOOD PRESSURE: 136 MMHG | HEART RATE: 73 BPM | BODY MASS INDEX: 38.89 KG/M2 | RESPIRATION RATE: 18 BRPM | HEIGHT: 64 IN | SYSTOLIC BLOOD PRESSURE: 143 MMHG | WEIGHT: 227.81 LBS | HEIGHT: 65 IN | HEART RATE: 77 BPM | DIASTOLIC BLOOD PRESSURE: 75 MMHG | TEMPERATURE: 98 F | DIASTOLIC BLOOD PRESSURE: 82 MMHG

## 2023-01-01 VITALS
RESPIRATION RATE: 18 BRPM | TEMPERATURE: 98 F | BODY MASS INDEX: 40.82 KG/M2 | SYSTOLIC BLOOD PRESSURE: 127 MMHG | HEART RATE: 115 BPM | OXYGEN SATURATION: 96 % | SYSTOLIC BLOOD PRESSURE: 114 MMHG | DIASTOLIC BLOOD PRESSURE: 80 MMHG | BODY MASS INDEX: 38.79 KG/M2 | DIASTOLIC BLOOD PRESSURE: 84 MMHG | HEART RATE: 80 BPM | WEIGHT: 227.19 LBS | RESPIRATION RATE: 18 BRPM | HEIGHT: 64 IN | OXYGEN SATURATION: 99 % | WEIGHT: 245 LBS | TEMPERATURE: 98 F | HEIGHT: 65 IN

## 2023-01-01 VITALS
HEIGHT: 65 IN | HEART RATE: 105 BPM | RESPIRATION RATE: 18 BRPM | DIASTOLIC BLOOD PRESSURE: 62 MMHG | TEMPERATURE: 98 F | SYSTOLIC BLOOD PRESSURE: 103 MMHG | OXYGEN SATURATION: 95 % | BODY MASS INDEX: 41.39 KG/M2 | WEIGHT: 248.44 LBS

## 2023-01-01 VITALS
RESPIRATION RATE: 18 BRPM | BODY MASS INDEX: 38.11 KG/M2 | HEART RATE: 101 BPM | DIASTOLIC BLOOD PRESSURE: 83 MMHG | SYSTOLIC BLOOD PRESSURE: 125 MMHG | OXYGEN SATURATION: 100 % | WEIGHT: 229 LBS | TEMPERATURE: 97 F

## 2023-01-01 VITALS
HEIGHT: 65 IN | RESPIRATION RATE: 20 BRPM | WEIGHT: 231.5 LBS | OXYGEN SATURATION: 99 % | DIASTOLIC BLOOD PRESSURE: 81 MMHG | BODY MASS INDEX: 38.57 KG/M2 | HEART RATE: 92 BPM | TEMPERATURE: 98 F | SYSTOLIC BLOOD PRESSURE: 144 MMHG

## 2023-01-01 VITALS
RESPIRATION RATE: 18 BRPM | SYSTOLIC BLOOD PRESSURE: 128 MMHG | HEIGHT: 65 IN | TEMPERATURE: 98 F | OXYGEN SATURATION: 100 % | WEIGHT: 229.38 LBS | DIASTOLIC BLOOD PRESSURE: 82 MMHG | BODY MASS INDEX: 38.21 KG/M2 | HEART RATE: 66 BPM

## 2023-01-01 VITALS
WEIGHT: 226.88 LBS | BODY MASS INDEX: 37.8 KG/M2 | HEART RATE: 96 BPM | RESPIRATION RATE: 20 BRPM | TEMPERATURE: 97 F | OXYGEN SATURATION: 99 % | DIASTOLIC BLOOD PRESSURE: 80 MMHG | SYSTOLIC BLOOD PRESSURE: 143 MMHG | HEIGHT: 65 IN

## 2023-01-01 VITALS
OXYGEN SATURATION: 100 % | TEMPERATURE: 98 F | HEIGHT: 64 IN | HEART RATE: 96 BPM | DIASTOLIC BLOOD PRESSURE: 64 MMHG | BODY MASS INDEX: 38.76 KG/M2 | RESPIRATION RATE: 16 BRPM | DIASTOLIC BLOOD PRESSURE: 82 MMHG | HEART RATE: 120 BPM | WEIGHT: 236.38 LBS | BODY MASS INDEX: 40.36 KG/M2 | HEIGHT: 64 IN | SYSTOLIC BLOOD PRESSURE: 124 MMHG | SYSTOLIC BLOOD PRESSURE: 113 MMHG | TEMPERATURE: 98 F | WEIGHT: 227 LBS

## 2023-01-01 VITALS
HEIGHT: 65 IN | OXYGEN SATURATION: 100 % | HEIGHT: 65 IN | WEIGHT: 228.38 LBS | DIASTOLIC BLOOD PRESSURE: 82 MMHG | BODY MASS INDEX: 38.05 KG/M2 | WEIGHT: 232.38 LBS | BODY MASS INDEX: 38.72 KG/M2 | TEMPERATURE: 96 F | RESPIRATION RATE: 18 BRPM | HEART RATE: 62 BPM | SYSTOLIC BLOOD PRESSURE: 151 MMHG

## 2023-01-01 VITALS — WEIGHT: 244.31 LBS | BODY MASS INDEX: 40.71 KG/M2 | HEIGHT: 65 IN

## 2023-01-01 DIAGNOSIS — J96.01 ACUTE RESPIRATORY FAILURE WITH HYPOXIA: ICD-10-CM

## 2023-01-01 DIAGNOSIS — C50.919 BREAST CANCER METASTASIZED TO BONE, UNSPECIFIED LATERALITY: ICD-10-CM

## 2023-01-01 DIAGNOSIS — R59.0 AXILLARY ADENOPATHY: ICD-10-CM

## 2023-01-01 DIAGNOSIS — C50.911 BREAST CANCER METASTASIZED TO AXILLARY LYMPH NODE, RIGHT: ICD-10-CM

## 2023-01-01 DIAGNOSIS — C77.3 BREAST CANCER METASTASIZED TO AXILLARY LYMPH NODE, RIGHT: ICD-10-CM

## 2023-01-01 DIAGNOSIS — C50.911 BREAST CANCER METASTASIZED TO AXILLARY LYMPH NODE, RIGHT: Primary | ICD-10-CM

## 2023-01-01 DIAGNOSIS — R11.0 CHEMOTHERAPY-INDUCED NAUSEA: ICD-10-CM

## 2023-01-01 DIAGNOSIS — R53.1 GENERALIZED WEAKNESS: ICD-10-CM

## 2023-01-01 DIAGNOSIS — C77.3 BREAST CANCER METASTASIZED TO AXILLARY LYMPH NODE, RIGHT: Primary | ICD-10-CM

## 2023-01-01 DIAGNOSIS — J90 PLEURAL EFFUSION: ICD-10-CM

## 2023-01-01 DIAGNOSIS — C79.31 BRAIN METASTASES: ICD-10-CM

## 2023-01-01 DIAGNOSIS — R06.02 SOB (SHORTNESS OF BREATH): ICD-10-CM

## 2023-01-01 DIAGNOSIS — R42 DIZZINESS: Primary | ICD-10-CM

## 2023-01-01 DIAGNOSIS — C79.51 BREAST CANCER METASTASIZED TO BONE, UNSPECIFIED LATERALITY: ICD-10-CM

## 2023-01-01 DIAGNOSIS — R59.0 AXILLARY ADENOPATHY: Primary | ICD-10-CM

## 2023-01-01 DIAGNOSIS — Z51.11 ENCOUNTER FOR CHEMOTHERAPY MANAGEMENT: ICD-10-CM

## 2023-01-01 DIAGNOSIS — R59.0 LYMPHADENOPATHY, SUPRACLAVICULAR: Primary | ICD-10-CM

## 2023-01-01 DIAGNOSIS — C79.31 SECONDARY MALIGNANT NEOPLASM OF BRAIN AND SPINAL CORD: Primary | ICD-10-CM

## 2023-01-01 DIAGNOSIS — Z76.89 ENCOUNTER FOR PREVENTION OF NEUTROPENIA DUE TO CHEMOTHERAPY: Primary | ICD-10-CM

## 2023-01-01 DIAGNOSIS — Z76.89 ENCOUNTER FOR PREVENTION OF NEUTROPENIA DUE TO CHEMOTHERAPY: ICD-10-CM

## 2023-01-01 DIAGNOSIS — R59.0 LYMPHADENOPATHY, SUPRACLAVICULAR: ICD-10-CM

## 2023-01-01 DIAGNOSIS — R07.9 CHEST PAIN: ICD-10-CM

## 2023-01-01 DIAGNOSIS — Z98.890 S/P VASCULAR SURGERY: Primary | ICD-10-CM

## 2023-01-01 DIAGNOSIS — C79.49 SECONDARY MALIGNANT NEOPLASM OF BRAIN AND SPINAL CORD: Primary | ICD-10-CM

## 2023-01-01 DIAGNOSIS — C50.919 TRIPLE NEGATIVE BREAST CANCER: ICD-10-CM

## 2023-01-01 DIAGNOSIS — R60.0 EDEMA OF RIGHT UPPER EXTREMITY: ICD-10-CM

## 2023-01-01 DIAGNOSIS — C79.9 METASTATIC MALIGNANT NEOPLASM, UNSPECIFIED SITE: ICD-10-CM

## 2023-01-01 DIAGNOSIS — R00.0 TACHYCARDIA: Primary | ICD-10-CM

## 2023-01-01 DIAGNOSIS — I89.0 LYMPHEDEMA: ICD-10-CM

## 2023-01-01 DIAGNOSIS — J90 PLEURAL EFFUSION ON RIGHT: ICD-10-CM

## 2023-01-01 DIAGNOSIS — K52.9 COLITIS: ICD-10-CM

## 2023-01-01 DIAGNOSIS — L08.9 SKIN INFECTION: ICD-10-CM

## 2023-01-01 DIAGNOSIS — R53.1 GENERALIZED WEAKNESS: Primary | ICD-10-CM

## 2023-01-01 DIAGNOSIS — T45.1X5A CHEMOTHERAPY-INDUCED NAUSEA: ICD-10-CM

## 2023-01-01 DIAGNOSIS — R00.0 TACHYCARDIA: ICD-10-CM

## 2023-01-01 DIAGNOSIS — G89.3 CANCER ASSOCIATED PAIN: ICD-10-CM

## 2023-01-01 DIAGNOSIS — D61.810 PANCYTOPENIA DUE TO ANTINEOPLASTIC CHEMOTHERAPY: ICD-10-CM

## 2023-01-01 DIAGNOSIS — T45.1X5A PANCYTOPENIA DUE TO ANTINEOPLASTIC CHEMOTHERAPY: ICD-10-CM

## 2023-01-01 DIAGNOSIS — G89.3 CANCER RELATED PAIN: ICD-10-CM

## 2023-01-01 DIAGNOSIS — R59.1 LYMPHADENOPATHY: Primary | ICD-10-CM

## 2023-01-01 DIAGNOSIS — G89.3 CANCER ASSOCIATED PAIN: Primary | ICD-10-CM

## 2023-01-01 LAB
ABS NEUT (OLG): 12.82 X10(3)/MCL (ref 2.1–9.2)
ABS NEUT (OLG): 13.07 X10(3)/MCL (ref 2.1–9.2)
ABS NEUT (OLG): 4.79 X10(3)/MCL (ref 2.1–9.2)
ABS NEUT (OLG): 8.46 X10(3)/MCL (ref 2.1–9.2)
ABS NEUT (OLG): ABNORMAL
ABS NEUT CALC (OHS): 0.82 X10(3)/MCL (ref 2.1–9.2)
ABS NEUT CALC (OHS): 0.92 X10(3)/MCL (ref 2.1–9.2)
ABS NEUT CALC (OHS): 3.78 X10(3)/MCL (ref 2.1–9.2)
ALBUMIN SERPL-MCNC: 2.2 G/DL (ref 3.5–5)
ALBUMIN SERPL-MCNC: 2.4 G/DL (ref 3.5–5)
ALBUMIN SERPL-MCNC: 2.4 G/DL (ref 3.5–5)
ALBUMIN SERPL-MCNC: 2.6 G/DL (ref 3.5–5)
ALBUMIN SERPL-MCNC: 2.6 G/DL (ref 3.5–5)
ALBUMIN SERPL-MCNC: 3 G/DL (ref 3.5–5)
ALBUMIN SERPL-MCNC: 3 G/DL (ref 3.5–5)
ALBUMIN SERPL-MCNC: 3.1 G/DL (ref 3.5–5)
ALBUMIN SERPL-MCNC: 3.7 G/DL (ref 3.5–5)
ALBUMIN SERPL-MCNC: 3.8 G/DL (ref 3.5–5)
ALBUMIN SERPL-MCNC: 3.8 G/DL (ref 3.5–5)
ALBUMIN/GLOB SERPL: 0.7 RATIO (ref 1.1–2)
ALBUMIN/GLOB SERPL: 0.8 RATIO (ref 1.1–2)
ALBUMIN/GLOB SERPL: 0.9 RATIO (ref 1.1–2)
ALBUMIN/GLOB SERPL: 0.9 RATIO (ref 1.1–2)
ALBUMIN/GLOB SERPL: 1 RATIO (ref 1.1–2)
ALBUMIN/GLOB SERPL: 1 RATIO (ref 1.1–2)
ALBUMIN/GLOB SERPL: 1.1 RATIO (ref 1.1–2)
ALBUMIN/GLOB SERPL: 1.1 RATIO (ref 1.1–2)
ALBUMIN/GLOB SERPL: 1.2 RATIO (ref 1.1–2)
ALP SERPL-CCNC: 116 UNIT/L (ref 40–150)
ALP SERPL-CCNC: 132 UNIT/L (ref 40–150)
ALP SERPL-CCNC: 138 UNIT/L (ref 40–150)
ALP SERPL-CCNC: 144 UNIT/L (ref 40–150)
ALP SERPL-CCNC: 147 UNIT/L (ref 40–150)
ALP SERPL-CCNC: 156 UNIT/L (ref 40–150)
ALP SERPL-CCNC: 160 UNIT/L (ref 40–150)
ALP SERPL-CCNC: 163 UNIT/L (ref 40–150)
ALP SERPL-CCNC: 171 UNIT/L (ref 40–150)
ALP SERPL-CCNC: 256 UNIT/L (ref 40–150)
ALP SERPL-CCNC: 92 UNIT/L (ref 40–150)
ALT SERPL-CCNC: 126 UNIT/L (ref 0–55)
ALT SERPL-CCNC: 24 UNIT/L (ref 0–55)
ALT SERPL-CCNC: 32 UNIT/L (ref 0–55)
ALT SERPL-CCNC: 33 UNIT/L (ref 0–55)
ALT SERPL-CCNC: 50 UNIT/L (ref 0–55)
ALT SERPL-CCNC: 53 UNIT/L (ref 0–55)
ALT SERPL-CCNC: 86 UNIT/L (ref 0–55)
ALT SERPL-CCNC: 88 UNIT/L (ref 0–55)
ALT SERPL-CCNC: 89 UNIT/L (ref 0–55)
ALT SERPL-CCNC: 96 UNIT/L (ref 0–55)
ALT SERPL-CCNC: 98 UNIT/L (ref 0–55)
AMMONIA PLAS-MSCNC: 16.2 UMOL/L (ref 18–72)
AMMONIA PLAS-MSCNC: 38.5 UMOL/L (ref 18–72)
AMORPH URATE CRY URNS QL MICRO: ABNORMAL /HPF
AMPHIPHYSIN AB TITR CSF IF: NEGATIVE {TITER}
ANION GAP SERPL CALC-SCNC: 12 MEQ/L
ANION GAP SERPL CALC-SCNC: 6 MEQ/L
ANION GAP SERPL CALC-SCNC: 7 MEQ/L
ANION GAP SERPL CALC-SCNC: 7 MEQ/L
ANION GAP SERPL CALC-SCNC: 9 MEQ/L
ANISOCYTOSIS BLD QL SMEAR: ABNORMAL
ANNOTATION COMMENT IMP: NORMAL
ANTIBODY IDENTIFICATION: NORMAL
APPEARANCE CSF: CLEAR
APPEARANCE UR: ABNORMAL
APPEARANCE UR: CLEAR
APTT PPP: 28.8 SECONDS (ref 23.4–33.9)
APTT PPP: 29.2 SECONDS (ref 23.2–33.7)
AST SERPL-CCNC: 23 UNIT/L (ref 5–34)
AST SERPL-CCNC: 27 UNIT/L (ref 5–34)
AST SERPL-CCNC: 28 UNIT/L (ref 5–34)
AST SERPL-CCNC: 29 UNIT/L (ref 5–34)
AST SERPL-CCNC: 34 UNIT/L (ref 5–34)
AST SERPL-CCNC: 56 UNIT/L (ref 5–34)
AST SERPL-CCNC: 60 UNIT/L (ref 5–34)
AST SERPL-CCNC: 62 UNIT/L (ref 5–34)
AST SERPL-CCNC: 62 UNIT/L (ref 5–34)
AST SERPL-CCNC: 76 UNIT/L (ref 5–34)
AST SERPL-CCNC: 88 UNIT/L (ref 5–34)
AV INDEX (PROSTH): 0.52
AV MEAN GRADIENT: 6 MMHG
AV PEAK GRADIENT: 12 MMHG
AV VALVE AREA: 1.65 CM2
AV VELOCITY RATIO: 0.5
BACTERIA #/AREA URNS AUTO: ABNORMAL /HPF
BACTERIA #/AREA URNS AUTO: ABNORMAL /HPF
BACTERIA BLD CULT: NORMAL
BACTERIA BLD CULT: NORMAL
BACTERIA SPEC CULT: ABNORMAL
BACTERIA STL CULT: NORMAL
BASE EXCESS ARTERIAL: 7 MMOL/L (ref -2–2)
BASE EXCESS ARTERIAL: ABNORMAL
BASOPHILS # BLD AUTO: 0 X10(3)/MCL (ref 0–0.2)
BASOPHILS # BLD AUTO: 0 X10(3)/MCL (ref 0–0.2)
BASOPHILS # BLD AUTO: 0.01 X10(3)/MCL (ref 0–0.2)
BASOPHILS # BLD AUTO: 0.03 X10(3)/MCL (ref 0–0.2)
BASOPHILS # BLD AUTO: 0.03 X10(3)/MCL (ref 0–0.2)
BASOPHILS # BLD AUTO: 0.04 X10(3)/MCL (ref 0–0.2)
BASOPHILS NFR BLD AUTO: 0 %
BASOPHILS NFR BLD AUTO: 0 %
BASOPHILS NFR BLD AUTO: 0.1 %
BASOPHILS NFR BLD AUTO: 0.2 %
BASOPHILS NFR BLD AUTO: 0.2 %
BASOPHILS NFR BLD AUTO: 0.3 %
BASOPHILS NFR BLD AUTO: 0.6 %
BASOPHILS NFR BLD AUTO: 0.7 %
BASOPHILS NFR BLD MANUAL: 0.11 X10(3)/MCL (ref 0–0.2)
BASOPHILS NFR BLD MANUAL: 2 %
BEAKER SEE SCANNED REPORT: NORMAL
BEAKER SEE SCANNED REPORT: NORMAL
BILIRUB UR QL STRIP.AUTO: NEGATIVE MG/DL
BILIRUB UR QL STRIP.AUTO: NEGATIVE MG/DL
BILIRUBIN DIRECT+TOT PNL SERPL-MCNC: 0.2 MG/DL
BILIRUBIN DIRECT+TOT PNL SERPL-MCNC: 0.3 MG/DL
BILIRUBIN DIRECT+TOT PNL SERPL-MCNC: 0.4 MG/DL
BILIRUBIN DIRECT+TOT PNL SERPL-MCNC: 0.5 MG/DL
BNP BLD-MCNC: 18.1 PG/ML
BNP BLD-MCNC: 24.7 PG/ML
BSA FOR ECHO PROCEDURE: 2.2 M2
BUN SERPL-MCNC: 10.3 MG/DL (ref 9.8–20.1)
BUN SERPL-MCNC: 10.7 MG/DL (ref 9.8–20.1)
BUN SERPL-MCNC: 10.8 MG/DL (ref 9.8–20.1)
BUN SERPL-MCNC: 10.8 MG/DL (ref 9.8–20.1)
BUN SERPL-MCNC: 13.8 MG/DL (ref 9.8–20.1)
BUN SERPL-MCNC: 14.2 MG/DL (ref 9.8–20.1)
BUN SERPL-MCNC: 15.5 MG/DL (ref 9.8–20.1)
BUN SERPL-MCNC: 15.7 MG/DL (ref 9.8–20.1)
BUN SERPL-MCNC: 16.9 MG/DL (ref 9.8–20.1)
BUN SERPL-MCNC: 17.3 MG/DL (ref 9.8–20.1)
BUN SERPL-MCNC: 17.6 MG/DL (ref 9.8–20.1)
BUN SERPL-MCNC: 18.1 MG/DL (ref 9.8–20.1)
BUN SERPL-MCNC: 5.5 MG/DL (ref 9.8–20.1)
BUN SERPL-MCNC: 7 MG/DL (ref 9.8–20.1)
BUN SERPL-MCNC: 8.7 MG/DL (ref 9.8–20.1)
BUN SERPL-MCNC: 8.7 MG/DL (ref 9.8–20.1)
C DIFF TOX GENS STL QL NAA+PROBE: DETECTED
C1INH SERPL-MCNC: NORMAL MG/DL
CALCIUM SERPL-MCNC: 10.3 MG/DL (ref 8.4–10.2)
CALCIUM SERPL-MCNC: 7.5 MG/DL (ref 8.4–10.2)
CALCIUM SERPL-MCNC: 8.2 MG/DL (ref 8.4–10.2)
CALCIUM SERPL-MCNC: 8.2 MG/DL (ref 8.4–10.2)
CALCIUM SERPL-MCNC: 8.4 MG/DL (ref 8.4–10.2)
CALCIUM SERPL-MCNC: 8.6 MG/DL (ref 8.4–10.2)
CALCIUM SERPL-MCNC: 8.8 MG/DL (ref 8.4–10.2)
CALCIUM SERPL-MCNC: 9 MG/DL (ref 8.4–10.2)
CALCIUM SERPL-MCNC: 9.1 MG/DL (ref 8.4–10.2)
CALCIUM SERPL-MCNC: 9.2 MG/DL (ref 8.4–10.2)
CALCIUM SERPL-MCNC: 9.3 MG/DL (ref 8.4–10.2)
CALCIUM SERPL-MCNC: 9.5 MG/DL (ref 8.4–10.2)
CALCIUM SERPL-MCNC: 9.5 MG/DL (ref 8.4–10.2)
CALCIUM SERPL-MCNC: 9.6 MG/DL (ref 8.4–10.2)
CALCIUM SERPL-MCNC: 9.8 MG/DL (ref 8.4–10.2)
CALCIUM SERPL-MCNC: 9.8 MG/DL (ref 8.4–10.2)
CHLORIDE SERPL-SCNC: 100 MMOL/L (ref 98–107)
CHLORIDE SERPL-SCNC: 101 MMOL/L (ref 98–107)
CHLORIDE SERPL-SCNC: 102 MMOL/L (ref 98–107)
CHLORIDE SERPL-SCNC: 105 MMOL/L (ref 98–107)
CHLORIDE SERPL-SCNC: 105 MMOL/L (ref 98–107)
CHLORIDE SERPL-SCNC: 97 MMOL/L (ref 98–107)
CHLORIDE SERPL-SCNC: 98 MMOL/L (ref 98–107)
CHLORIDE SERPL-SCNC: 99 MMOL/L (ref 98–107)
CLOSTRIDIUM DIFFICILE GDH ANTIGEN (OHS): POSITIVE
CLOSTRIDIUM DIFFICILE TOXIN A/B (OHS): NEGATIVE
CO2 SERPL-SCNC: 26 MMOL/L (ref 22–29)
CO2 SERPL-SCNC: 27 MMOL/L (ref 22–29)
CO2 SERPL-SCNC: 27 MMOL/L (ref 22–29)
CO2 SERPL-SCNC: 28 MMOL/L (ref 22–29)
CO2 SERPL-SCNC: 29 MMOL/L (ref 22–29)
CO2 SERPL-SCNC: 30 MMOL/L (ref 22–29)
CO2 SERPL-SCNC: 31 MMOL/L (ref 22–29)
CO2 SERPL-SCNC: 32 MMOL/L (ref 22–29)
CO2 TOTAL: 33
COLOR CSF: COLORLESS
COLOR STL: NORMAL
COLOR UR AUTO: ABNORMAL
COLOR UR AUTO: YELLOW
CONSISTENCY STL: NORMAL
CORRECTED TEMPERATURE (PCO2): 58 MMHG (ref 19–50)
CORRECTED TEMPERATURE (PH): 7.42 (ref 7.35–7.45)
CORRECTED TEMPERATURE (PO2): 74 MMHG (ref 80–100)
CREAT SERPL-MCNC: 0.58 MG/DL (ref 0.55–1.02)
CREAT SERPL-MCNC: 0.6 MG/DL (ref 0.55–1.02)
CREAT SERPL-MCNC: 0.6 MG/DL (ref 0.55–1.02)
CREAT SERPL-MCNC: 0.63 MG/DL (ref 0.55–1.02)
CREAT SERPL-MCNC: 0.66 MG/DL (ref 0.55–1.02)
CREAT SERPL-MCNC: 0.69 MG/DL (ref 0.55–1.02)
CREAT SERPL-MCNC: 0.72 MG/DL (ref 0.55–1.02)
CREAT SERPL-MCNC: 0.74 MG/DL (ref 0.55–1.02)
CREAT SERPL-MCNC: 0.75 MG/DL (ref 0.55–1.02)
CREAT SERPL-MCNC: 0.79 MG/DL (ref 0.55–1.02)
CREAT SERPL-MCNC: 0.79 MG/DL (ref 0.55–1.02)
CREAT SERPL-MCNC: 0.82 MG/DL (ref 0.55–1.02)
CREAT SERPL-MCNC: 0.82 MG/DL (ref 0.55–1.02)
CREAT SERPL-MCNC: 0.84 MG/DL (ref 0.55–1.02)
CREAT/UREA NIT SERPL: 12
CREAT/UREA NIT SERPL: 22
CREAT/UREA NIT SERPL: 24
CREAT/UREA NIT SERPL: 26
CREAT/UREA NIT SERPL: 27
CV ECHO LV RWT: 0.81 CM
CV2 IGG TITR CSF: NEGATIVE {TITER}
DHEA SERPL-MCNC: NORMAL
DOP CALC AO PEAK VEL: 1.74 M/S
DOP CALC AO VTI: 37.4 CM
DOP CALC LVOT AREA: 3.1 CM2
DOP CALC LVOT DIAMETER: 2 CM
DOP CALC LVOT PEAK VEL: 0.87 M/S
DOP CALC LVOT STROKE VOLUME: 61.54 CM3
DOP CALC MV VTI: 35 CM
DOP CALCLVOT PEAK VEL VTI: 19.6 CM
E WAVE DECELERATION TIME: 211 MSEC
E/A RATIO: 1.56
E/E' RATIO: 6.23 M/S
ECHO LV POSTERIOR WALL: 1.53 CM (ref 0.6–1.1)
EJECTION FRACTION: 63 %
EOSINOPHIL # BLD AUTO: 0 X10(3)/MCL (ref 0–0.9)
EOSINOPHIL # BLD AUTO: 0.01 X10(3)/MCL (ref 0–0.9)
EOSINOPHIL NFR BLD AUTO: 0 %
EOSINOPHIL NFR BLD AUTO: 0.1 %
EOSINOPHIL NFR BLD AUTO: 0.2 %
EOSINOPHIL NFR BLD AUTO: 0.2 %
EOSINOPHIL NFR BLD MANUAL: 0.05 X10(3)/MCL (ref 0–0.9)
EOSINOPHIL NFR BLD MANUAL: 1 % (ref 0–8)
ERYTHROCYTE [DISTWIDTH] IN BLOOD BY AUTOMATED COUNT: 13 % (ref 11.5–17)
ERYTHROCYTE [DISTWIDTH] IN BLOOD BY AUTOMATED COUNT: 13.2 % (ref 11.5–17)
ERYTHROCYTE [DISTWIDTH] IN BLOOD BY AUTOMATED COUNT: 13.2 % (ref 11–14.5)
ERYTHROCYTE [DISTWIDTH] IN BLOOD BY AUTOMATED COUNT: 13.4 % (ref 11.5–17)
ERYTHROCYTE [DISTWIDTH] IN BLOOD BY AUTOMATED COUNT: 13.6 % (ref 11.5–17)
ERYTHROCYTE [DISTWIDTH] IN BLOOD BY AUTOMATED COUNT: 15.9 % (ref 11.5–17)
ERYTHROCYTE [DISTWIDTH] IN BLOOD BY AUTOMATED COUNT: 16.2 % (ref 11.5–17)
ERYTHROCYTE [DISTWIDTH] IN BLOOD BY AUTOMATED COUNT: 16.5 % (ref 11.5–17)
ERYTHROCYTE [DISTWIDTH] IN BLOOD BY AUTOMATED COUNT: 16.7 % (ref 11.5–17)
ERYTHROCYTE [DISTWIDTH] IN BLOOD BY AUTOMATED COUNT: 16.9 % (ref 11.5–17)
ERYTHROCYTE [DISTWIDTH] IN BLOOD BY AUTOMATED COUNT: 17 % (ref 11.5–17)
ERYTHROCYTE [DISTWIDTH] IN BLOOD BY AUTOMATED COUNT: 17 % (ref 11.5–17)
ERYTHROCYTE [DISTWIDTH] IN BLOOD BY AUTOMATED COUNT: 17.2 % (ref 11.5–17)
EST. AVERAGE GLUCOSE BLD GHB EST-MCNC: 177.2 MG/DL
ESTRIOL SERPL-MCNC: NORMAL NG/ML
ESTROGEN SERPL-MCNC: NORMAL PG/ML
FECAL LEUKOCYTE (OHS): POSITIVE
FERRITIN SERPL-MCNC: 2053.53 NG/ML (ref 4.63–204)
FERRITIN SERPL-MCNC: 2850.61 NG/ML (ref 4.63–204)
FLUAV AG UPPER RESP QL IA.RAPID: NOT DETECTED
FLUBV AG UPPER RESP QL IA.RAPID: NOT DETECTED
FOLATE SERPL-MCNC: 11.2 NG/ML (ref 7–31.4)
FOLATE SERPL-MCNC: 7.4 NG/ML (ref 7–31.4)
FRACTIONAL SHORTENING: 26 % (ref 28–44)
GFR SERPLBLD CREATININE-BSD FMLA CKD-EPI: 84 MLS/MIN/1.73/M2
GFR SERPLBLD CREATININE-BSD FMLA CKD-EPI: >60 MLS/MIN/1.73/M2
GIANT PLATELETS: ABNORMAL
GLOBULIN SER-MCNC: 2.8 GM/DL (ref 2.4–3.5)
GLOBULIN SER-MCNC: 2.9 GM/DL (ref 2.4–3.5)
GLOBULIN SER-MCNC: 2.9 GM/DL (ref 2.4–3.5)
GLOBULIN SER-MCNC: 3 GM/DL (ref 2.4–3.5)
GLOBULIN SER-MCNC: 3.1 GM/DL (ref 2.4–3.5)
GLOBULIN SER-MCNC: 3.1 GM/DL (ref 2.4–3.5)
GLOBULIN SER-MCNC: 3.3 GM/DL (ref 2.4–3.5)
GLOBULIN SER-MCNC: 3.4 GM/DL (ref 2.4–3.5)
GLOBULIN SER-MCNC: 3.7 GM/DL (ref 2.4–3.5)
GLUCOSE CSF-MCNC: 28 MG/DL (ref 40–70)
GLUCOSE SERPL-MCNC: 102 MG/DL (ref 74–100)
GLUCOSE SERPL-MCNC: 107 MG/DL (ref 74–100)
GLUCOSE SERPL-MCNC: 112 MG/DL (ref 74–100)
GLUCOSE SERPL-MCNC: 118 MG/DL (ref 74–100)
GLUCOSE SERPL-MCNC: 120 MG/DL (ref 74–100)
GLUCOSE SERPL-MCNC: 123 MG/DL (ref 74–100)
GLUCOSE SERPL-MCNC: 131 MG/DL (ref 74–100)
GLUCOSE SERPL-MCNC: 136 MG/DL (ref 74–100)
GLUCOSE SERPL-MCNC: 144 MG/DL (ref 74–100)
GLUCOSE SERPL-MCNC: 165 MG/DL (ref 74–100)
GLUCOSE SERPL-MCNC: 173 MG/DL (ref 74–100)
GLUCOSE SERPL-MCNC: 86 MG/DL (ref 74–100)
GLUCOSE SERPL-MCNC: 90 MG/DL (ref 74–100)
GLUCOSE SERPL-MCNC: 93 MG/DL (ref 74–100)
GLUCOSE UR QL STRIP.AUTO: NEGATIVE MG/DL
GLUCOSE UR QL STRIP.AUTO: NEGATIVE MG/DL
GROUP & RH: ABNORMAL
HBA1C MFR BLD: 7.8 %
HCO3 ARTERIAL: 31.6 MMOL/L (ref 18–23)
HCO3 ARTERIAL: ABNORMAL
HCO3 UR-SCNC: 31.6 MMOL/L (ref 24–28)
HCO3 UR-SCNC: 37.6 MMOL/L (ref 22–26)
HCT VFR BLD AUTO: 23.2 % (ref 37–47)
HCT VFR BLD AUTO: 23.6 % (ref 37–47)
HCT VFR BLD AUTO: 24.3 % (ref 37–47)
HCT VFR BLD AUTO: 24.7 % (ref 37–47)
HCT VFR BLD AUTO: 24.9 % (ref 37–47)
HCT VFR BLD AUTO: 25.3 % (ref 37–47)
HCT VFR BLD AUTO: 25.3 % (ref 37–47)
HCT VFR BLD AUTO: 25.7 % (ref 37–47)
HCT VFR BLD AUTO: 26 % (ref 37–47)
HCT VFR BLD AUTO: 26.3 % (ref 37–47)
HCT VFR BLD AUTO: 26.4 % (ref 37–47)
HCT VFR BLD AUTO: 27 % (ref 37–47)
HCT VFR BLD AUTO: 27.1 % (ref 37–47)
HCT VFR BLD AUTO: 30.2 % (ref 37–47)
HCT VFR BLD AUTO: 35.7 % (ref 37–47)
HCT VFR BLD AUTO: 39.8 % (ref 37–47)
HCT VFR BLD AUTO: 42.4 % (ref 37–47)
HEMATOLOGIST REVIEW: NORMAL
HEMOCCULT SP1 STL QL: NEGATIVE
HGB BLD-MCNC: 11.4 GM/DL (ref 12–16)
HGB BLD-MCNC: 12.1 GM/DL (ref 12–16)
HGB BLD-MCNC: 13.2 GM/DL (ref 12–16)
HGB BLD-MCNC: 7.1 G/DL (ref 12–16)
HGB BLD-MCNC: 7.4 G/DL (ref 12–16)
HGB BLD-MCNC: 7.4 G/DL (ref 12–16)
HGB BLD-MCNC: 7.7 G/DL (ref 12–16)
HGB BLD-MCNC: 7.7 G/DL (ref 12–16)
HGB BLD-MCNC: 7.8 G/DL (ref 12–16)
HGB BLD-MCNC: 7.8 G/DL (ref 12–16)
HGB BLD-MCNC: 7.9 G/DL (ref 12–16)
HGB BLD-MCNC: 8 G/DL (ref 12–16)
HGB BLD-MCNC: 8 G/DL (ref 12–16)
HGB BLD-MCNC: 8.1 G/DL (ref 12–16)
HGB BLD-MCNC: 8.3 G/DL (ref 12–16)
HGB BLD-MCNC: 9.1 G/DL (ref 12–16)
HU2 AB TITR CSF IF: NEGATIVE {TITER}
HYPOCHROMIA BLD QL SMEAR: ABNORMAL
HYPOCHROMIA BLD QL SMEAR: SLIGHT
IMM GRANULOCYTES # BLD AUTO: 0.01 X10(3)/MCL (ref 0–0.04)
IMM GRANULOCYTES # BLD AUTO: 0.02 X10(3)/MCL (ref 0–0.04)
IMM GRANULOCYTES # BLD AUTO: 0.02 X10(3)/MCL (ref 0–0.04)
IMM GRANULOCYTES # BLD AUTO: 0.04 X10(3)/MCL (ref 0–0.04)
IMM GRANULOCYTES # BLD AUTO: 0.04 X10(3)/MCL (ref 0–0.04)
IMM GRANULOCYTES # BLD AUTO: 0.05 X10(3)/MCL (ref 0–0.04)
IMM GRANULOCYTES # BLD AUTO: 0.14 X10(3)/MCL (ref 0–0.04)
IMM GRANULOCYTES # BLD AUTO: 0.41 X10(3)/MCL (ref 0–0.04)
IMM GRANULOCYTES # BLD AUTO: 1.09 X10(3)/MCL (ref 0–0.04)
IMM GRANULOCYTES NFR BLD AUTO: 0.2 %
IMM GRANULOCYTES NFR BLD AUTO: 0.3 %
IMM GRANULOCYTES NFR BLD AUTO: 0.7 %
IMM GRANULOCYTES NFR BLD AUTO: 0.8 %
IMM GRANULOCYTES NFR BLD AUTO: 1.1 %
IMM GRANULOCYTES NFR BLD AUTO: 1.6 %
IMM GRANULOCYTES NFR BLD AUTO: 1.8 %
IMM GRANULOCYTES NFR BLD AUTO: 2.6 %
IMM GRANULOCYTES NFR BLD AUTO: 20.1 %
IMMUNOLOGIST REVIEW: NORMAL
INDIRECT COOMBS GEL: ABNORMAL
INR BLD: 1.05 (ref 0–1.3)
INR BLD: 1.18 (ref 2–3)
INSTRUMENT WBC (OLG): 13.9 X10(3)/MCL
INSTRUMENT WBC (OLG): 14.4 X10(3)/MCL
INSTRUMENT WBC (OLG): 5.7 X10(3)/MCL
INSTRUMENT WBC (OLG): 9.5 X10(3)/MCL
INSULIN SERPL-ACNC: NORMAL U[IU]/ML
INTERVENTRICULAR SEPTUM: 1.43 CM (ref 0.6–1.1)
IRON SATN MFR SERPL: 19 % (ref 20–50)
IRON SATN MFR SERPL: ABNORMAL %
IRON SERPL-MCNC: 22 UG/DL (ref 50–170)
IRON SERPL-MCNC: 243 UG/DL (ref 50–170)
KETONES UR QL STRIP.AUTO: ABNORMAL MG/DL
KETONES UR QL STRIP.AUTO: NEGATIVE MG/DL
LAB AP CLINICAL INFORMATION: NORMAL
LAB AP GROSS DESCRIPTION: NORMAL
LAB AP REPORT FOOTNOTES: NORMAL
LACTATE SERPL-SCNC: 1.2 MMOL/L (ref 0.5–2.2)
LEFT ATRIUM SIZE: 3.2 CM
LEFT INTERNAL DIMENSION IN SYSTOLE: 2.77 CM (ref 2.1–4)
LEFT VENTRICLE DIASTOLIC VOLUME INDEX: 28.63 ML/M2
LEFT VENTRICLE DIASTOLIC VOLUME: 60.4 ML
LEFT VENTRICLE MASS INDEX: 99 G/M2
LEFT VENTRICLE SYSTOLIC VOLUME INDEX: 13.6 ML/M2
LEFT VENTRICLE SYSTOLIC VOLUME: 28.8 ML
LEFT VENTRICULAR INTERNAL DIMENSION IN DIASTOLE: 3.76 CM (ref 3.5–6)
LEFT VENTRICULAR MASS: 208.86 G
LEUKO NEG CONT (OHS): NEGATIVE
LEUKO POS CONT (OHS): POSITIVE
LEUKOCYTE ESTERASE UR QL STRIP.AUTO: ABNORMAL UNIT/L
LEUKOCYTE ESTERASE UR QL STRIP.AUTO: NEGATIVE UNIT/L
LIPASE SERPL-CCNC: 8 U/L
LV LATERAL E/E' RATIO: 5.79 M/S
LV SEPTAL E/E' RATIO: 6.75 M/S
LVOT MG: 2 MMHG
LVOT MV: 0.57 CM/S
LYMPH ABN # BLD MANUAL: 1 %
LYMPH ABN # BLD MANUAL: 6 %
LYMPHOCYTE MANUAL CSF (OHS): 76 %
LYMPHOCYTES # BLD AUTO: 0.33 X10(3)/MCL (ref 0.6–4.6)
LYMPHOCYTES # BLD AUTO: 0.42 X10(3)/MCL (ref 0.6–4.6)
LYMPHOCYTES # BLD AUTO: 0.46 X10(3)/MCL (ref 0.6–4.6)
LYMPHOCYTES # BLD AUTO: 0.96 X10(3)/MCL (ref 0.6–4.6)
LYMPHOCYTES # BLD AUTO: 1.02 X10(3)/MCL (ref 0.6–4.6)
LYMPHOCYTES # BLD AUTO: 1.15 X10(3)/MCL (ref 0.6–4.6)
LYMPHOCYTES # BLD AUTO: 1.37 X10(3)/MCL (ref 0.6–4.6)
LYMPHOCYTES # BLD AUTO: 1.81 X10(3)/MCL (ref 0.6–4.6)
LYMPHOCYTES NFR BLD AUTO: 11.6 %
LYMPHOCYTES NFR BLD AUTO: 12.3 %
LYMPHOCYTES NFR BLD AUTO: 13.8 %
LYMPHOCYTES NFR BLD AUTO: 16.7 %
LYMPHOCYTES NFR BLD AUTO: 19.6 %
LYMPHOCYTES NFR BLD AUTO: 26.2 %
LYMPHOCYTES NFR BLD AUTO: 33.8 %
LYMPHOCYTES NFR BLD AUTO: 4.4 %
LYMPHOCYTES NFR BLD MANUAL: 0.38 X10(3)/MCL
LYMPHOCYTES NFR BLD MANUAL: 0.42 X10(3)/MCL
LYMPHOCYTES NFR BLD MANUAL: 0.46 X10(3)/MCL
LYMPHOCYTES NFR BLD MANUAL: 0.51 X10(3)/MCL
LYMPHOCYTES NFR BLD MANUAL: 0.56 X10(3)/MCL
LYMPHOCYTES NFR BLD MANUAL: 0.72 X10(3)/MCL
LYMPHOCYTES NFR BLD MANUAL: 1.03 X10(3)/MCL
LYMPHOCYTES NFR BLD MANUAL: 15 %
LYMPHOCYTES NFR BLD MANUAL: 19 % (ref 13–40)
LYMPHOCYTES NFR BLD MANUAL: 25 % (ref 13–40)
LYMPHOCYTES NFR BLD MANUAL: 32 % (ref 13–40)
LYMPHOCYTES NFR BLD MANUAL: 4 %
LYMPHOCYTES NFR BLD MANUAL: 4 %
LYMPHOCYTES NFR BLD MANUAL: 5 %
LYMPHOCYTES NFR BLD MANUAL: 8 %
M AGNA-1, CSF: NEGATIVE
M ANNA-1, CSF: NEGATIVE
M ANNA-3, CSF: NEGATIVE
MACROCYTES BLD QL SMEAR: ABNORMAL
MAGNESIUM SERPL-MCNC: 1.8 MG/DL (ref 1.6–2.6)
MAGNESIUM SERPL-MCNC: 1.9 MG/DL (ref 1.6–2.6)
MAGNESIUM SERPL-MCNC: 1.9 MG/DL (ref 1.6–2.6)
MAGNESIUM SERPL-MCNC: 2 MG/DL (ref 1.6–2.6)
MAGNESIUM SERPL-MCNC: 2 MG/DL (ref 1.6–2.6)
MCH RBC QN AUTO: 27 PG (ref 27–31)
MCH RBC QN AUTO: 27.1 PG (ref 27–31)
MCH RBC QN AUTO: 27.2 PG (ref 27–31)
MCH RBC QN AUTO: 27.4 PG
MCH RBC QN AUTO: 27.5 PG (ref 27–31)
MCH RBC QN AUTO: 27.6 PG (ref 27–31)
MCH RBC QN AUTO: 27.8 PG
MCH RBC QN AUTO: 27.8 PG (ref 27–31)
MCH RBC QN AUTO: 27.9 PG (ref 27–31)
MCH RBC QN AUTO: 28 PG
MCH RBC QN AUTO: 28 PG (ref 27–31)
MCH RBC QN AUTO: 28.1 PG
MCH RBC QN AUTO: 28.2 PG
MCH RBC QN AUTO: 28.7 PG
MCHC RBC AUTO-ENTMCNC: 29.5 G/DL (ref 33–36)
MCHC RBC AUTO-ENTMCNC: 29.9 G/DL (ref 33–36)
MCHC RBC AUTO-ENTMCNC: 30.1 G/DL (ref 33–36)
MCHC RBC AUTO-ENTMCNC: 30.4 G/DL (ref 33–36)
MCHC RBC AUTO-ENTMCNC: 30.4 MG/DL (ref 33–36)
MCHC RBC AUTO-ENTMCNC: 30.5 G/DL (ref 33–36)
MCHC RBC AUTO-ENTMCNC: 30.6 G/DL (ref 33–36)
MCHC RBC AUTO-ENTMCNC: 30.7 G/DL (ref 33–36)
MCHC RBC AUTO-ENTMCNC: 30.8 G/DL (ref 33–36)
MCHC RBC AUTO-ENTMCNC: 30.9 G/DL (ref 33–36)
MCHC RBC AUTO-ENTMCNC: 31.1 MG/DL (ref 33–36)
MCHC RBC AUTO-ENTMCNC: 31.2 G/DL (ref 33–36)
MCHC RBC AUTO-ENTMCNC: 31.4 G/DL (ref 33–36)
MCHC RBC AUTO-ENTMCNC: 31.6 G/DL (ref 33–36)
MCHC RBC AUTO-ENTMCNC: 31.6 G/DL (ref 33–36)
MCHC RBC AUTO-ENTMCNC: 31.9 MG/DL (ref 33–36)
MCV RBC AUTO: 87.8 FL (ref 80–94)
MCV RBC AUTO: 88.2 FL (ref 80–94)
MCV RBC AUTO: 88.9 FL (ref 80–94)
MCV RBC AUTO: 89.1 FL (ref 80–94)
MCV RBC AUTO: 89.1 FL (ref 80–94)
MCV RBC AUTO: 89.4 FL (ref 80–94)
MCV RBC AUTO: 89.5 FL (ref 80–94)
MCV RBC AUTO: 89.8 FL (ref 80–94)
MCV RBC AUTO: 89.9 FL (ref 80–94)
MCV RBC AUTO: 90.3 FL (ref 80–94)
MCV RBC AUTO: 90.3 FL (ref 80–94)
MCV RBC AUTO: 90.7 FL (ref 80–94)
MCV RBC AUTO: 90.9 FL (ref 80–94)
MCV RBC AUTO: 91.3 FL (ref 80–94)
MCV RBC AUTO: 92.1 FL (ref 80–94)
MCV RBC AUTO: 93.5 FL (ref 80–94)
METAMYELOCYTES NFR BLD MANUAL: 1 %
METAMYELOCYTES NFR BLD MANUAL: 10 %
METAMYELOCYTES NFR BLD MANUAL: 13 %
METAMYELOCYTES NFR BLD MANUAL: 5 %
METAMYELOCYTES NFR BLD MANUAL: 5 %
METAMYELOCYTES NFR BLD MANUAL: 6 %
MICROCYTES BLD QL SMEAR: ABNORMAL
MONOCYTE MANUAL CSF (OHS): 11 %
MONOCYTES # BLD AUTO: 0.03 X10(3)/MCL (ref 0.1–1.3)
MONOCYTES # BLD AUTO: 0.06 X10(3)/MCL (ref 0.1–1.3)
MONOCYTES # BLD AUTO: 0.13 X10(3)/MCL (ref 0.1–1.3)
MONOCYTES # BLD AUTO: 0.17 X10(3)/MCL (ref 0.1–1.3)
MONOCYTES # BLD AUTO: 0.21 X10(3)/MCL (ref 0.1–1.3)
MONOCYTES # BLD AUTO: 0.37 X10(3)/MCL (ref 0.1–1.3)
MONOCYTES # BLD AUTO: 0.5 X10(3)/MCL (ref 0.1–1.3)
MONOCYTES # BLD AUTO: 1.33 X10(3)/MCL (ref 0.1–1.3)
MONOCYTES NFR BLD AUTO: 0.8 %
MONOCYTES NFR BLD AUTO: 2 %
MONOCYTES NFR BLD AUTO: 2.2 %
MONOCYTES NFR BLD AUTO: 3.7 %
MONOCYTES NFR BLD AUTO: 4.3 %
MONOCYTES NFR BLD AUTO: 6.3 %
MONOCYTES NFR BLD AUTO: 8.5 %
MONOCYTES NFR BLD AUTO: 9.6 %
MONOCYTES NFR BLD MANUAL: 0.18 X10(3)/MCL (ref 0.1–1.3)
MONOCYTES NFR BLD MANUAL: 0.28 X10(3)/MCL (ref 0.1–1.3)
MONOCYTES NFR BLD MANUAL: 0.34 X10(3)/MCL (ref 0.1–1.3)
MONOCYTES NFR BLD MANUAL: 0.34 X10(3)/MCL (ref 0.1–1.3)
MONOCYTES NFR BLD MANUAL: 0.54 X10(3)/MCL (ref 0.1–1.3)
MONOCYTES NFR BLD MANUAL: 0.67 X10(3)/MCL (ref 0.1–1.3)
MONOCYTES NFR BLD MANUAL: 0.86 X10(3)/MCL (ref 0.1–1.3)
MONOCYTES NFR BLD MANUAL: 10 % (ref 2–11)
MONOCYTES NFR BLD MANUAL: 11 % (ref 2–11)
MONOCYTES NFR BLD MANUAL: 12 %
MONOCYTES NFR BLD MANUAL: 2 %
MONOCYTES NFR BLD MANUAL: 20 % (ref 2–11)
MONOCYTES NFR BLD MANUAL: 6 %
MONOCYTES NFR BLD MANUAL: 6 %
MONOCYTES NFR BLD MANUAL: 7 %
MV MEAN GRADIENT: 2 MMHG
MV PEAK A VEL: 0.52 M/S
MV PEAK E VEL: 0.81 M/S
MV PEAK GRADIENT: 4 MMHG
MV STENOSIS PRESSURE HALF TIME: 70 MS
MV VALVE AREA BY CONTINUITY EQUATION: 1.76 CM2
MV VALVE AREA P 1/2 METHOD: 3.14 CM2
MYELOCYTES NFR BLD MANUAL: 10 %
MYELOCYTES NFR BLD MANUAL: 2 %
MYELOCYTES NFR BLD MANUAL: 5 %
MYELOCYTES NFR BLD MANUAL: 6 %
MYELOCYTES NFR BLD MANUAL: 8 %
NEUTROPHILS # BLD AUTO: 1.92 X10(3)/MCL (ref 2.1–9.2)
NEUTROPHILS # BLD AUTO: 12.06 X10(3)/MCL (ref 2.1–9.2)
NEUTROPHILS # BLD AUTO: 2.43 X10(3)/MCL (ref 2.1–9.2)
NEUTROPHILS # BLD AUTO: 3.21 X10(3)/MCL (ref 2.1–9.2)
NEUTROPHILS # BLD AUTO: 3.27 X10(3)/MCL (ref 2.1–9.2)
NEUTROPHILS # BLD AUTO: 4.27 X10(3)/MCL (ref 2.1–9.2)
NEUTROPHILS # BLD AUTO: 4.55 X10(3)/MCL (ref 2.1–9.2)
NEUTROPHILS # BLD AUTO: 6.92 X10(3)/MCL (ref 2.1–9.2)
NEUTROPHILS MAN CSF (OHS): 13 %
NEUTROPHILS NFR BLD AUTO: 62.6 %
NEUTROPHILS NFR BLD AUTO: 63.5 %
NEUTROPHILS NFR BLD AUTO: 72.9 %
NEUTROPHILS NFR BLD AUTO: 77 %
NEUTROPHILS NFR BLD AUTO: 79.2 %
NEUTROPHILS NFR BLD AUTO: 80 %
NEUTROPHILS NFR BLD AUTO: 85.8 %
NEUTROPHILS NFR BLD AUTO: 91.5 %
NEUTROPHILS NFR BLD MANUAL: 42 % (ref 47–80)
NEUTROPHILS NFR BLD MANUAL: 43 % (ref 47–80)
NEUTROPHILS NFR BLD MANUAL: 52 % (ref 47–80)
NEUTROPHILS NFR BLD MANUAL: 56 %
NEUTROPHILS NFR BLD MANUAL: 71 %
NEUTROPHILS NFR BLD MANUAL: 74 %
NEUTROPHILS NFR BLD MANUAL: 74 %
NEUTROPHILS NFR BLD MANUAL: 93 %
NEUTS BAND NFR BLD MANUAL: 12 % (ref 0–11)
NEUTS BAND NFR BLD MANUAL: 8 % (ref 0–11)
NITRITE UR QL STRIP.AUTO: NEGATIVE
NITRITE UR QL STRIP.AUTO: NEGATIVE
NRBC BLD AUTO-RTO: 0 %
NRBC BLD AUTO-RTO: 0 % (ref 0–1)
NRBC BLD AUTO-RTO: 0.4 %
NRBC BLD AUTO-RTO: 1.5 %
NRBC BLD AUTO-RTO: 1.5 %
NRBC BLD AUTO-RTO: 1.8 %
NRBC BLD AUTO-RTO: 4.3 %
NRBC BLD MANUAL-RTO: 11 %
NRBC BLD MANUAL-RTO: 2 %
NRBC BLD MANUAL-RTO: 3 %
NRBC BLD MANUAL-RTO: 4 %
NRBC BLD MANUAL-RTO: 4 %
NRBC BLD MANUAL-RTO: 7 %
O2 SATURATION ARTERIAL: 97 % (ref 95–98)
PATH REV: NORMAL
PCA-1 AB TITR CSF: NEGATIVE {TITER}
PCA-2 AB TITR CSF: NEGATIVE {TITER}
PCA-TR AB TITR CSF IF: NEGATIVE {TITER}
PCO2 ARTERIAL: 49 MM HG (ref 35–45)
PCO2 BLDA: 49.4 MMHG (ref 35–45)
PCO2 BLDA: 58 MMHG (ref 19–50)
PCO2 BLDA: ABNORMAL MM[HG]
PH ARTERIAL: 7.41 (ref 7.35–7.45)
PH SMN: 7.41 [PH] (ref 7.35–7.45)
PH SMN: 7.42 [PH] (ref 7.35–7.45)
PH UR STRIP.AUTO: 6 [PH]
PH UR STRIP.AUTO: 6.5 [PH]
PHOSPHATE SERPL-MCNC: 2 MG/DL (ref 2.3–4.7)
PHOSPHATE SERPL-MCNC: 2.1 MG/DL (ref 2.3–4.7)
PHOSPHATE SERPL-MCNC: 3.5 MG/DL (ref 2.3–4.7)
PHOSPHATE SERPL-MCNC: 4.8 MG/DL (ref 2.3–4.7)
PISA TR MAX VEL: 2.19 M/S
PLATELET # BLD AUTO: 110 X10(3)/MCL (ref 130–400)
PLATELET # BLD AUTO: 116 X10(3)/MCL (ref 130–400)
PLATELET # BLD AUTO: 148 X10(3)/MCL (ref 130–400)
PLATELET # BLD AUTO: 162 X10(3)/MCL (ref 130–400)
PLATELET # BLD AUTO: 298 X10(3)/MCL (ref 130–400)
PLATELET # BLD AUTO: 317 X10(3)/MCL (ref 130–400)
PLATELET # BLD AUTO: 325 X10(3)/MCL (ref 140–371)
PLATELET # BLD AUTO: 358 X10(3)/MCL (ref 130–400)
PLATELET # BLD AUTO: 360 X10(3)/MCL (ref 130–400)
PLATELET # BLD AUTO: 388 X10(3)/MCL (ref 130–400)
PLATELET # BLD AUTO: 410 X10(3)/MCL (ref 130–400)
PLATELET # BLD AUTO: 46 X10(3)/MCL (ref 130–400)
PLATELET # BLD AUTO: 467 X10(3)/MCL (ref 130–400)
PLATELET # BLD AUTO: 549 X10(3)/MCL (ref 130–400)
PLATELET # BLD AUTO: 76 X10(3)/MCL (ref 130–400)
PLATELET # BLD AUTO: 82 X10(3)/MCL (ref 130–400)
PLATELET # BLD EST: ABNORMAL 10*3/UL
PLATELET # BLD EST: ADEQUATE 10*3/UL
PLATELET # BLD EST: NORMAL 10*3/UL
PLATELETS.RETICULATED NFR BLD AUTO: 7.6 % (ref 0.9–11.2)
PMV BLD AUTO: 10 FL (ref 7.4–10.4)
PMV BLD AUTO: 10.3 FL (ref 7.4–10.4)
PMV BLD AUTO: 10.5 FL (ref 7.4–10.4)
PMV BLD AUTO: 10.8 FL (ref 7.4–10.4)
PMV BLD AUTO: 10.9 FL (ref 7.4–10.4)
PMV BLD AUTO: 11 FL (ref 9.4–12.4)
PMV BLD AUTO: 11.2 FL (ref 7.4–10.4)
PMV BLD AUTO: 11.3 FL (ref 7.4–10.4)
PMV BLD AUTO: 11.5 FL (ref 7.4–10.4)
PMV BLD AUTO: 11.6 FL (ref 7.4–10.4)
PMV BLD AUTO: 12 FL (ref 7.4–10.4)
PMV BLD AUTO: 8.3 FL (ref 7.4–10.4)
PMV BLD AUTO: 9.7 FL (ref 7.4–10.4)
PMV BLD AUTO: 9.7 FL (ref 7.4–10.4)
PMV BLD AUTO: 9.8 FL (ref 7.4–10.4)
PMV BLD AUTO: 9.8 FL (ref 7.4–10.4)
PO2 ARTERIAL: 96
PO2 BLDA: 74 MMHG (ref 80–100)
PO2 BLDA: 96 MMHG (ref 80–100)
POC BASE DEFICIT: 11.7 MMOL/L (ref -2–2)
POC BE: 7 MMOL/L
POC COHB: 1.6 %
POC COHB: ABNORMAL
POC FIO2: ABNORMAL
POC IONIZED CALCIUM: 1.14 MMOL/L (ref 1.12–1.23)
POC METHB: 1.1 % (ref 0.4–1.5)
POC METHB: ABNORMAL
POC O2HB: 94.2 % (ref 94–97)
POC O2HB: ABNORMAL
POC SATURATED O2: 95 %
POC SATURATED O2: 97 % (ref 95–100)
POC TCO2: 33 MMOL/L (ref 23–27)
POC TEMPERATURE: 37 °C
POCT GLUCOSE: 108 MG/DL (ref 70–110)
POCT GLUCOSE: 114 MG/DL (ref 70–110)
POCT GLUCOSE: 121 MG/DL (ref 70–110)
POCT GLUCOSE: 133 MG/DL (ref 70–110)
POCT GLUCOSE: 148 MG/DL (ref 70–110)
POCT GLUCOSE: 173 MG/DL (ref 70–110)
POCT GLUCOSE: 217 MG/DL (ref 70–110)
POCT GLUCOSE: 243 MG/DL (ref 70–110)
POCT GLUCOSE: 77 MG/DL (ref 70–110)
POCT GLUCOSE: 93 MG/DL (ref 70–110)
POIKILOCYTOSIS BLD QL SMEAR: ABNORMAL
POIKILOCYTOSIS BLD QL SMEAR: SLIGHT
POLYCHROMASIA BLD QL SMEAR: ABNORMAL
POLYCHROMASIA BLD QL SMEAR: ABNORMAL
POLYCHROMASIA BLD QL SMEAR: SLIGHT
POTASSIUM BLD-SCNC: 3.7 MMOL/L (ref 3.5–5)
POTASSIUM SERPL-SCNC: 3.6 MMOL/L (ref 3.5–5.1)
POTASSIUM SERPL-SCNC: 3.8 MMOL/L (ref 3.5–5.1)
POTASSIUM SERPL-SCNC: 3.9 MMOL/L (ref 3.5–5.1)
POTASSIUM SERPL-SCNC: 3.9 MMOL/L (ref 3.5–5.1)
POTASSIUM SERPL-SCNC: 4.1 MMOL/L (ref 3.5–5.1)
POTASSIUM SERPL-SCNC: 4.2 MMOL/L (ref 3.5–5.1)
POTASSIUM SERPL-SCNC: 4.3 MMOL/L (ref 3.5–5.1)
POTASSIUM SERPL-SCNC: 4.6 MMOL/L (ref 3.5–5.1)
POTASSIUM SERPL-SCNC: 4.9 MMOL/L (ref 3.5–5.1)
POTASSIUM SERPL-SCNC: 5.1 MMOL/L (ref 3.5–5.1)
POTASSIUM SERPL-SCNC: 5.9 MMOL/L (ref 3.5–5.1)
PROMYELOCYTES # BLD MANUAL: 2 %
PROMYELOCYTES # BLD MANUAL: 2 %
PROMYELOCYTES # BLD MANUAL: 5 %
PROT CSF-MCNC: 63.7 MG/DL (ref 15–45)
PROT SERPL-MCNC: 5.1 GM/DL (ref 6.4–8.3)
PROT SERPL-MCNC: 5.4 GM/DL (ref 6.4–8.3)
PROT SERPL-MCNC: 5.4 GM/DL (ref 6.4–8.3)
PROT SERPL-MCNC: 5.7 GM/DL (ref 6.4–8.3)
PROT SERPL-MCNC: 6 GM/DL (ref 6.4–8.3)
PROT SERPL-MCNC: 6 GM/DL (ref 6.4–8.3)
PROT SERPL-MCNC: 6.1 GM/DL (ref 6.4–8.3)
PROT SERPL-MCNC: 6.3 GM/DL (ref 6.4–8.3)
PROT SERPL-MCNC: 6.9 GM/DL (ref 6.4–8.3)
PROT SERPL-MCNC: 7 GM/DL (ref 6.4–8.3)
PROT SERPL-MCNC: 7.5 GM/DL (ref 6.4–8.3)
PROT UR QL STRIP.AUTO: 30 MG/DL
PROT UR QL STRIP.AUTO: NEGATIVE MG/DL
PROTHROMBIN TIME: 13.6 SECONDS (ref 12.5–14.5)
PROTHROMBIN TIME: 14.8 SECONDS (ref 11.7–14.5)
PSYCHE PATHOLOGY RESULT: NORMAL
PV PEAK VELOCITY: 1 CM/S
RA PRESSURE: 3 MMHG
RA WIDTH: 3.99 CM
RBC # BLD AUTO: 2.57 X10(6)/MCL (ref 4.2–5.4)
RBC # BLD AUTO: 2.64 X10(6)/MCL (ref 4.2–5.4)
RBC # BLD AUTO: 2.68 X10(6)/MCL (ref 4.2–5.4)
RBC # BLD AUTO: 2.8 X10(6)/MCL (ref 4.2–5.4)
RBC # BLD AUTO: 2.8 X10(6)/MCL (ref 4.2–5.4)
RBC # BLD AUTO: 2.84 X10(6)/MCL (ref 4.2–5.4)
RBC # BLD AUTO: 2.88 X10(6)/MCL (ref 4.2–5.4)
RBC # BLD AUTO: 2.88 X10(6)/MCL (ref 4.2–5.4)
RBC # BLD AUTO: 2.89 X10(6)/MCL (ref 4.2–5.4)
RBC # BLD AUTO: 2.93 X10(6)/MCL (ref 4.2–5.4)
RBC # BLD AUTO: 2.98 X10(6)/MCL (ref 4.2–5.4)
RBC # BLD AUTO: 3.03 X10(6)/MCL (ref 4.2–5.4)
RBC # BLD AUTO: 3.23 X10(6)/MCL (ref 4.2–5.4)
RBC # BLD AUTO: 3.97 X10(6)/MCL (ref 4.2–5.4)
RBC # BLD AUTO: 4.32 X10(6)/MCL (ref 4.2–5.4)
RBC # BLD AUTO: 4.74 X10(6)/MCL (ref 4.2–5.4)
RBC # CSF MANUAL: 20 /UL
RBC #/AREA URNS AUTO: ABNORMAL /HPF
RBC #/AREA URNS AUTO: ABNORMAL /HPF
RBC MORPH BLD: ABNORMAL
RBC MORPH BLD: NORMAL
RBC UR QL AUTO: NEGATIVE UNIT/L
RBC UR QL AUTO: NEGATIVE UNIT/L
RIGHT VENTRICULAR END-DIASTOLIC DIMENSION: 2.42 CM
RSV A 5' UTR RNA NPH QL NAA+PROBE: NOT DETECTED
SAMPLE: ABNORMAL
SARS-COV-2 RNA RESP QL NAA+PROBE: NOT DETECTED
SATURATED O2 ARTERIAL, I-STAT: ABNORMAL
SODIUM BLD-SCNC: 132 MMOL/L (ref 137–145)
SODIUM SERPL-SCNC: 134 MMOL/L (ref 136–145)
SODIUM SERPL-SCNC: 135 MMOL/L (ref 136–145)
SODIUM SERPL-SCNC: 136 MMOL/L (ref 136–145)
SODIUM SERPL-SCNC: 137 MMOL/L (ref 136–145)
SODIUM SERPL-SCNC: 138 MMOL/L (ref 136–145)
SODIUM SERPL-SCNC: 138 MMOL/L (ref 136–145)
SODIUM SERPL-SCNC: 139 MMOL/L (ref 136–145)
SODIUM SERPL-SCNC: 140 MMOL/L (ref 136–145)
SODIUM SERPL-SCNC: 140 MMOL/L (ref 136–145)
SP GR UR STRIP.AUTO: 1.02
SP GR UR STRIP.AUTO: 1.02 (ref 1–1.03)
SPECIMEN OUTDATE: ABNORMAL
SPECIMEN SOURCE: ABNORMAL
SQUAMOUS #/AREA URNS AUTO: ABNORMAL /HPF
SQUAMOUS #/AREA URNS AUTO: ABNORMAL /HPF
STOMATOCYTES (OLG): ABNORMAL
STOMATOCYTES (OLG): ABNORMAL
T3RU NFR SERPL: NORMAL %
TARGETS BLD QL SMEAR: ABNORMAL
TDI LATERAL: 0.14 M/S
TDI SEPTAL: 0.12 M/S
TDI: 0.13 M/S
TIBC SERPL-MCNC: 118 UG/DL (ref 250–450)
TIBC SERPL-MCNC: 96 UG/DL (ref 70–310)
TIBC SERPL-MCNC: <25 UG/DL (ref 70–310)
TIBC SERPL-MCNC: <268 UG/DL (ref 250–450)
TOXIC GRANULES BLD QL SMEAR: ABNORMAL
TR MAX PG: 19 MMHG
TRANSFERRIN SERPL-MCNC: 103 MG/DL (ref 180–382)
TRANSFERRIN SERPL-MCNC: 173 MG/DL (ref 180–382)
TRICUSPID ANNULAR PLANE SYSTOLIC EXCURSION: 2.96 CM
TROPONIN I SERPL-MCNC: <0.01 NG/ML (ref 0–0.04)
TROPONIN I SERPL-MCNC: <0.01 NG/ML (ref 0–0.04)
TSH SERPL-ACNC: 0.88 UIU/ML (ref 0.35–4.94)
TV REST PULMONARY ARTERY PRESSURE: 22 MMHG
UROBILINOGEN UR STRIP-ACNC: 0.2 MG/DL
UROBILINOGEN UR STRIP-ACNC: 0.2 MG/DL
VIT B12 SERPL-MCNC: 1366 PG/ML (ref 213–816)
VIT B12 SERPL-MCNC: 1611 PG/ML (ref 213–816)
WBC # CSF MANUAL: 37 /UL (ref 0–5)
WBC # SPEC AUTO: 1.6 X10(3)/MCL (ref 4.5–11.5)
WBC # SPEC AUTO: 1.7 X10(3)/MCL (ref 4.5–11.5)
WBC # SPEC AUTO: 13.9 X10(3)/MCL (ref 4.5–11.5)
WBC # SPEC AUTO: 14.4 X10(3)/MCL (ref 4.5–11.5)
WBC # SPEC AUTO: 15.7 X10(3)/MCL (ref 4.5–11.5)
WBC # SPEC AUTO: 3 X10(3)/MCL (ref 4.5–11.5)
WBC # SPEC AUTO: 3 X10(3)/MCL (ref 4.5–11.5)
WBC # SPEC AUTO: 3.5 X10(3)/MCL (ref 4.5–11.5)
WBC # SPEC AUTO: 3.7 X10(3)/MCL (ref 4.5–11.5)
WBC # SPEC AUTO: 5.2 X10(3)/MCL (ref 4.5–11.5)
WBC # SPEC AUTO: 5.4 X10(3)/MCL (ref 4.5–11.5)
WBC # SPEC AUTO: 5.6 X10(3)/MCL (ref 4.5–11.5)
WBC # SPEC AUTO: 5.7 X10(3)/MCL (ref 4.5–11.5)
WBC # SPEC AUTO: 5.9 X10(3)/MCL (ref 4.5–11.5)
WBC # SPEC AUTO: 7.6 X10(3)/MCL (ref 4.5–11.5)
WBC # SPEC AUTO: 9.2 X10(3)/MCL (ref 4.5–11.5)
WBC #/AREA URNS AUTO: ABNORMAL /HPF
WBC #/AREA URNS AUTO: ABNORMAL /HPF

## 2023-01-01 PROCEDURE — 99215 OFFICE O/P EST HI 40 MIN: CPT | Mod: S$GLB,,, | Performed by: STUDENT IN AN ORGANIZED HEALTH CARE EDUCATION/TRAINING PROGRAM

## 2023-01-01 PROCEDURE — 99999 PR PBB SHADOW E&M-EST. PATIENT-LVL V: CPT | Mod: PBBFAC,,, | Performed by: SURGERY

## 2023-01-01 PROCEDURE — 85025 COMPLETE CBC W/AUTO DIFF WBC: CPT | Performed by: INTERNAL MEDICINE

## 2023-01-01 PROCEDURE — 3079F PR MOST RECENT DIASTOLIC BLOOD PRESSURE 80-89 MM HG: ICD-10-PCS | Mod: CPTII,S$GLB,, | Performed by: STUDENT IN AN ORGANIZED HEALTH CARE EDUCATION/TRAINING PROGRAM

## 2023-01-01 PROCEDURE — 99999 PR PBB SHADOW E&M-EST. PATIENT-LVL IV: CPT | Mod: PBBFAC,,, | Performed by: STUDENT IN AN ORGANIZED HEALTH CARE EDUCATION/TRAINING PROGRAM

## 2023-01-01 PROCEDURE — 77001 FLUOROGUIDE FOR VEIN DEVICE: CPT | Mod: 26,,, | Performed by: SURGERY

## 2023-01-01 PROCEDURE — 80053 COMPREHEN METABOLIC PANEL: CPT

## 2023-01-01 PROCEDURE — 96375 TX/PRO/DX INJ NEW DRUG ADDON: CPT

## 2023-01-01 PROCEDURE — 25000003 PHARM REV CODE 250: Performed by: INTERNAL MEDICINE

## 2023-01-01 PROCEDURE — 11000001 HC ACUTE MED/SURG PRIVATE ROOM

## 2023-01-01 PROCEDURE — C1751 CATH, INF, PER/CENT/MIDLINE: HCPCS

## 2023-01-01 PROCEDURE — 84100 ASSAY OF PHOSPHORUS: CPT | Performed by: INTERNAL MEDICINE

## 2023-01-01 PROCEDURE — 82728 ASSAY OF FERRITIN: CPT | Performed by: INTERNAL MEDICINE

## 2023-01-01 PROCEDURE — 80053 COMPREHEN METABOLIC PANEL: CPT | Performed by: INTERNAL MEDICINE

## 2023-01-01 PROCEDURE — 1160F RVW MEDS BY RX/DR IN RCRD: CPT | Mod: CPTII,S$GLB,, | Performed by: SURGERY

## 2023-01-01 PROCEDURE — 25000242 PHARM REV CODE 250 ALT 637 W/ HCPCS: Performed by: STUDENT IN AN ORGANIZED HEALTH CARE EDUCATION/TRAINING PROGRAM

## 2023-01-01 PROCEDURE — 97116 GAIT TRAINING THERAPY: CPT

## 2023-01-01 PROCEDURE — 3079F DIAST BP 80-89 MM HG: CPT | Mod: CPTII,S$GLB,, | Performed by: NURSE PRACTITIONER

## 2023-01-01 PROCEDURE — 3079F DIAST BP 80-89 MM HG: CPT | Mod: CPTII,S$GLB,, | Performed by: STUDENT IN AN ORGANIZED HEALTH CARE EDUCATION/TRAINING PROGRAM

## 2023-01-01 PROCEDURE — 85027 COMPLETE CBC AUTOMATED: CPT | Performed by: NURSE PRACTITIONER

## 2023-01-01 PROCEDURE — 99215 PR OFFICE/OUTPT VISIT, EST, LEVL V, 40-54 MIN: ICD-10-PCS | Mod: S$GLB,,, | Performed by: STUDENT IN AN ORGANIZED HEALTH CARE EDUCATION/TRAINING PROGRAM

## 2023-01-01 PROCEDURE — 85025 COMPLETE CBC W/AUTO DIFF WBC: CPT | Performed by: STUDENT IN AN ORGANIZED HEALTH CARE EDUCATION/TRAINING PROGRAM

## 2023-01-01 PROCEDURE — 3008F BODY MASS INDEX DOCD: CPT | Mod: CPTII,S$GLB,, | Performed by: STUDENT IN AN ORGANIZED HEALTH CARE EDUCATION/TRAINING PROGRAM

## 2023-01-01 PROCEDURE — 93005 ELECTROCARDIOGRAM TRACING: CPT

## 2023-01-01 PROCEDURE — 25000003 PHARM REV CODE 250: Performed by: ANESTHESIOLOGY

## 2023-01-01 PROCEDURE — 77386 HC IMRT, COMPLEX: CPT | Performed by: RADIOLOGY

## 2023-01-01 PROCEDURE — 27000207 HC ISOLATION

## 2023-01-01 PROCEDURE — 93010 EKG 12-LEAD: ICD-10-PCS | Mod: ,,, | Performed by: INTERNAL MEDICINE

## 2023-01-01 PROCEDURE — 99205 OFFICE O/P NEW HI 60 MIN: CPT | Mod: S$GLB,,, | Performed by: STUDENT IN AN ORGANIZED HEALTH CARE EDUCATION/TRAINING PROGRAM

## 2023-01-01 PROCEDURE — 94761 N-INVAS EAR/PLS OXIMETRY MLT: CPT

## 2023-01-01 PROCEDURE — 1160F PR REVIEW ALL MEDS BY PRESCRIBER/CLIN PHARMACIST DOCUMENTED: ICD-10-PCS | Mod: CPTII,95,, | Performed by: NURSE PRACTITIONER

## 2023-01-01 PROCEDURE — 0241U COVID/RSV/FLU A&B PCR: CPT | Performed by: STUDENT IN AN ORGANIZED HEALTH CARE EDUCATION/TRAINING PROGRAM

## 2023-01-01 PROCEDURE — 81001 URINALYSIS AUTO W/SCOPE: CPT | Performed by: NURSE PRACTITIONER

## 2023-01-01 PROCEDURE — 77336 RADIATION PHYSICS CONSULT: CPT | Performed by: RADIOLOGY

## 2023-01-01 PROCEDURE — 36561 PR INSERT TUNNELED CV CATH WITH PORT: ICD-10-PCS | Mod: LT,,, | Performed by: SURGERY

## 2023-01-01 PROCEDURE — 21400001 HC TELEMETRY ROOM

## 2023-01-01 PROCEDURE — 93010 ELECTROCARDIOGRAM REPORT: CPT | Mod: ,,, | Performed by: INTERNAL MEDICINE

## 2023-01-01 PROCEDURE — 1159F MED LIST DOCD IN RCRD: CPT | Mod: CPTII,95,, | Performed by: NURSE PRACTITIONER

## 2023-01-01 PROCEDURE — 3077F SYST BP >= 140 MM HG: CPT | Mod: CPTII,S$GLB,, | Performed by: STUDENT IN AN ORGANIZED HEALTH CARE EDUCATION/TRAINING PROGRAM

## 2023-01-01 PROCEDURE — 3074F SYST BP LT 130 MM HG: CPT | Mod: CPTII,S$GLB,, | Performed by: STUDENT IN AN ORGANIZED HEALTH CARE EDUCATION/TRAINING PROGRAM

## 2023-01-01 PROCEDURE — 85014 HEMATOCRIT: CPT | Performed by: INTERNAL MEDICINE

## 2023-01-01 PROCEDURE — 63600175 PHARM REV CODE 636 W HCPCS: Performed by: INTERNAL MEDICINE

## 2023-01-01 PROCEDURE — 36561 PR INSERT TUNNELED CV CATH WITH PORT: ICD-10-PCS | Mod: AS,LT,, | Performed by: PHYSICIAN ASSISTANT

## 2023-01-01 PROCEDURE — 3008F PR BODY MASS INDEX (BMI) DOCUMENTED: ICD-10-PCS | Mod: CPTII,S$GLB,, | Performed by: STUDENT IN AN ORGANIZED HEALTH CARE EDUCATION/TRAINING PROGRAM

## 2023-01-01 PROCEDURE — 63600175 PHARM REV CODE 636 W HCPCS: Performed by: STUDENT IN AN ORGANIZED HEALTH CARE EDUCATION/TRAINING PROGRAM

## 2023-01-01 PROCEDURE — 77049 MRI BREAST W/WO CONTRAST, W/CAD, BILATERAL: ICD-10-PCS | Mod: 26,,, | Performed by: RADIOLOGY

## 2023-01-01 PROCEDURE — 1160F PR REVIEW ALL MEDS BY PRESCRIBER/CLIN PHARMACIST DOCUMENTED: ICD-10-PCS | Mod: CPTII,S$GLB,, | Performed by: STUDENT IN AN ORGANIZED HEALTH CARE EDUCATION/TRAINING PROGRAM

## 2023-01-01 PROCEDURE — 82746 ASSAY OF FOLIC ACID SERUM: CPT | Performed by: INTERNAL MEDICINE

## 2023-01-01 PROCEDURE — 99900035 HC TECH TIME PER 15 MIN (STAT)

## 2023-01-01 PROCEDURE — 3078F DIAST BP <80 MM HG: CPT | Mod: CPTII,S$GLB,, | Performed by: STUDENT IN AN ORGANIZED HEALTH CARE EDUCATION/TRAINING PROGRAM

## 2023-01-01 PROCEDURE — 77301 RADIOTHERAPY DOSE PLAN IMRT: CPT | Performed by: RADIOLOGY

## 2023-01-01 PROCEDURE — 83036 HEMOGLOBIN GLYCOSYLATED A1C: CPT | Performed by: INTERNAL MEDICINE

## 2023-01-01 PROCEDURE — 83605 ASSAY OF LACTIC ACID: CPT | Performed by: INTERNAL MEDICINE

## 2023-01-01 PROCEDURE — A4216 STERILE WATER/SALINE, 10 ML: HCPCS | Performed by: INTERNAL MEDICINE

## 2023-01-01 PROCEDURE — 63600175 PHARM REV CODE 636 W HCPCS

## 2023-01-01 PROCEDURE — 99283 EMERGENCY DEPT VISIT LOW MDM: CPT | Mod: 25

## 2023-01-01 PROCEDURE — 1159F MED LIST DOCD IN RCRD: CPT | Mod: CPTII,S$GLB,, | Performed by: SURGERY

## 2023-01-01 PROCEDURE — 99499 UNLISTED E&M SERVICE: CPT | Mod: 95,,, | Performed by: NURSE PRACTITIONER

## 2023-01-01 PROCEDURE — 0240U COVID/FLU A&B PCR: CPT | Performed by: STUDENT IN AN ORGANIZED HEALTH CARE EDUCATION/TRAINING PROGRAM

## 2023-01-01 PROCEDURE — 85027 COMPLETE CBC AUTOMATED: CPT | Performed by: INTERNAL MEDICINE

## 2023-01-01 PROCEDURE — 97530 THERAPEUTIC ACTIVITIES: CPT | Mod: CQ

## 2023-01-01 PROCEDURE — 1159F MED LIST DOCD IN RCRD: CPT | Mod: CPTII,S$GLB,, | Performed by: STUDENT IN AN ORGANIZED HEALTH CARE EDUCATION/TRAINING PROGRAM

## 2023-01-01 PROCEDURE — 99233 SBSQ HOSP IP/OBS HIGH 50: CPT | Mod: ,,, | Performed by: STUDENT IN AN ORGANIZED HEALTH CARE EDUCATION/TRAINING PROGRAM

## 2023-01-01 PROCEDURE — 82607 VITAMIN B-12: CPT | Performed by: INTERNAL MEDICINE

## 2023-01-01 PROCEDURE — 93306 ECHO (CUPID ONLY): ICD-10-PCS | Mod: 26,,, | Performed by: INTERNAL MEDICINE

## 2023-01-01 PROCEDURE — 63600175 PHARM REV CODE 636 W HCPCS: Performed by: SURGERY

## 2023-01-01 PROCEDURE — 63600175 PHARM REV CODE 636 W HCPCS: Performed by: NURSE PRACTITIONER

## 2023-01-01 PROCEDURE — 25000003 PHARM REV CODE 250: Performed by: STUDENT IN AN ORGANIZED HEALTH CARE EDUCATION/TRAINING PROGRAM

## 2023-01-01 PROCEDURE — 87045 FECES CULTURE AEROBIC BACT: CPT | Performed by: INTERNAL MEDICINE

## 2023-01-01 PROCEDURE — 83880 ASSAY OF NATRIURETIC PEPTIDE: CPT | Performed by: INTERNAL MEDICINE

## 2023-01-01 PROCEDURE — 83690 ASSAY OF LIPASE: CPT | Performed by: INTERNAL MEDICINE

## 2023-01-01 PROCEDURE — 37799 UNLISTED PX VASCULAR SURGERY: CPT

## 2023-01-01 PROCEDURE — 25500020 PHARM REV CODE 255: Performed by: INTERNAL MEDICINE

## 2023-01-01 PROCEDURE — 97530 THERAPEUTIC ACTIVITIES: CPT

## 2023-01-01 PROCEDURE — 82803 BLOOD GASES ANY COMBINATION: CPT

## 2023-01-01 PROCEDURE — 1160F RVW MEDS BY RX/DR IN RCRD: CPT | Mod: CPTII,S$GLB,, | Performed by: STUDENT IN AN ORGANIZED HEALTH CARE EDUCATION/TRAINING PROGRAM

## 2023-01-01 PROCEDURE — C1788 PORT, INDWELLING, IMP: HCPCS | Performed by: SURGERY

## 2023-01-01 PROCEDURE — 80048 BASIC METABOLIC PNL TOTAL CA: CPT | Performed by: INTERNAL MEDICINE

## 2023-01-01 PROCEDURE — 96365 THER/PROPH/DIAG IV INF INIT: CPT

## 2023-01-01 PROCEDURE — 27000221 HC OXYGEN, UP TO 24 HOURS

## 2023-01-01 PROCEDURE — 36561 INSERT TUNNELED CV CATH: CPT | Mod: AS,LT,, | Performed by: PHYSICIAN ASSISTANT

## 2023-01-01 PROCEDURE — 96417 CHEMO IV INFUS EACH ADDL SEQ: CPT

## 2023-01-01 PROCEDURE — 36415 COLL VENOUS BLD VENIPUNCTURE: CPT

## 2023-01-01 PROCEDURE — 82140 ASSAY OF AMMONIA: CPT | Performed by: INTERNAL MEDICINE

## 2023-01-01 PROCEDURE — 3077F PR MOST RECENT SYSTOLIC BLOOD PRESSURE >= 140 MM HG: ICD-10-PCS | Mod: CPTII,S$GLB,, | Performed by: STUDENT IN AN ORGANIZED HEALTH CARE EDUCATION/TRAINING PROGRAM

## 2023-01-01 PROCEDURE — 1160F RVW MEDS BY RX/DR IN RCRD: CPT | Mod: CPTII,95,, | Performed by: STUDENT IN AN ORGANIZED HEALTH CARE EDUCATION/TRAINING PROGRAM

## 2023-01-01 PROCEDURE — 96360 HYDRATION IV INFUSION INIT: CPT

## 2023-01-01 PROCEDURE — 96376 TX/PRO/DX INJ SAME DRUG ADON: CPT

## 2023-01-01 PROCEDURE — 81001 URINALYSIS AUTO W/SCOPE: CPT | Performed by: INTERNAL MEDICINE

## 2023-01-01 PROCEDURE — 99232 PR SUBSEQUENT HOSPITAL CARE,LEVL II: ICD-10-PCS | Mod: ,,, | Performed by: STUDENT IN AN ORGANIZED HEALTH CARE EDUCATION/TRAINING PROGRAM

## 2023-01-01 PROCEDURE — 1159F PR MEDICATION LIST DOCUMENTED IN MEDICAL RECORD: ICD-10-PCS | Mod: CPTII,S$GLB,, | Performed by: STUDENT IN AN ORGANIZED HEALTH CARE EDUCATION/TRAINING PROGRAM

## 2023-01-01 PROCEDURE — 63600175 PHARM REV CODE 636 W HCPCS: Mod: JZ,JG | Performed by: NURSE PRACTITIONER

## 2023-01-01 PROCEDURE — 3078F PR MOST RECENT DIASTOLIC BLOOD PRESSURE < 80 MM HG: ICD-10-PCS | Mod: CPTII,S$GLB,, | Performed by: STUDENT IN AN ORGANIZED HEALTH CARE EDUCATION/TRAINING PROGRAM

## 2023-01-01 PROCEDURE — 96413 CHEMO IV INFUSION 1 HR: CPT

## 2023-01-01 PROCEDURE — 97162 PT EVAL MOD COMPLEX 30 MIN: CPT

## 2023-01-01 PROCEDURE — 99999 PR PBB SHADOW E&M-EST. PATIENT-LVL IV: ICD-10-PCS | Mod: PBBFAC,,, | Performed by: NURSE PRACTITIONER

## 2023-01-01 PROCEDURE — 70553 MRI BRAIN STEM W/O & W/DYE: CPT | Mod: TC

## 2023-01-01 PROCEDURE — 85027 COMPLETE CBC AUTOMATED: CPT

## 2023-01-01 PROCEDURE — 36410 VNPNXR 3YR/> PHY/QHP DX/THER: CPT

## 2023-01-01 PROCEDURE — 99291 CRITICAL CARE FIRST HOUR: CPT

## 2023-01-01 PROCEDURE — 3074F PR MOST RECENT SYSTOLIC BLOOD PRESSURE < 130 MM HG: ICD-10-PCS | Mod: CPTII,S$GLB,, | Performed by: STUDENT IN AN ORGANIZED HEALTH CARE EDUCATION/TRAINING PROGRAM

## 2023-01-01 PROCEDURE — 85025 COMPLETE CBC W/AUTO DIFF WBC: CPT

## 2023-01-01 PROCEDURE — 99215 PR OFFICE/OUTPT VISIT, EST, LEVL V, 40-54 MIN: ICD-10-PCS | Mod: 95,,, | Performed by: NURSE PRACTITIONER

## 2023-01-01 PROCEDURE — 1159F PR MEDICATION LIST DOCUMENTED IN MEDICAL RECORD: ICD-10-PCS | Mod: CPTII,S$GLB,, | Performed by: NURSE PRACTITIONER

## 2023-01-01 PROCEDURE — 99205 PR OFFICE/OUTPT VISIT, NEW, LEVL V, 60-74 MIN: ICD-10-PCS | Mod: S$GLB,,, | Performed by: SURGERY

## 2023-01-01 PROCEDURE — 85610 PROTHROMBIN TIME: CPT | Performed by: INTERNAL MEDICINE

## 2023-01-01 PROCEDURE — 77334 RADIATION TREATMENT AID(S): CPT | Performed by: RADIOLOGY

## 2023-01-01 PROCEDURE — 3008F BODY MASS INDEX DOCD: CPT | Mod: CPTII,S$GLB,, | Performed by: SURGERY

## 2023-01-01 PROCEDURE — 25500020 PHARM REV CODE 255: Performed by: STUDENT IN AN ORGANIZED HEALTH CARE EDUCATION/TRAINING PROGRAM

## 2023-01-01 PROCEDURE — 1159F MED LIST DOCD IN RCRD: CPT | Mod: CPTII,95,, | Performed by: STUDENT IN AN ORGANIZED HEALTH CARE EDUCATION/TRAINING PROGRAM

## 2023-01-01 PROCEDURE — 3074F PR MOST RECENT SYSTOLIC BLOOD PRESSURE < 130 MM HG: ICD-10-PCS | Mod: CPTII,S$GLB,, | Performed by: NURSE PRACTITIONER

## 2023-01-01 PROCEDURE — 77049 MRI BREAST C-+ W/CAD BI: CPT | Mod: 26,,, | Performed by: RADIOLOGY

## 2023-01-01 PROCEDURE — 1160F PR REVIEW ALL MEDS BY PRESCRIBER/CLIN PHARMACIST DOCUMENTED: ICD-10-PCS | Mod: CPTII,S$GLB,, | Performed by: NURSE PRACTITIONER

## 2023-01-01 PROCEDURE — 3008F PR BODY MASS INDEX (BMI) DOCUMENTED: ICD-10-PCS | Mod: CPTII,S$GLB,, | Performed by: NURSE PRACTITIONER

## 2023-01-01 PROCEDURE — 1160F PR REVIEW ALL MEDS BY PRESCRIBER/CLIN PHARMACIST DOCUMENTED: ICD-10-PCS | Mod: CPTII,95,, | Performed by: STUDENT IN AN ORGANIZED HEALTH CARE EDUCATION/TRAINING PROGRAM

## 2023-01-01 PROCEDURE — 99999 PR PBB SHADOW E&M-EST. PATIENT-LVL IV: ICD-10-PCS | Mod: PBBFAC,,, | Performed by: STUDENT IN AN ORGANIZED HEALTH CARE EDUCATION/TRAINING PROGRAM

## 2023-01-01 PROCEDURE — 86870 RBC ANTIBODY IDENTIFICATION: CPT | Performed by: INTERNAL MEDICINE

## 2023-01-01 PROCEDURE — 3079F PR MOST RECENT DIASTOLIC BLOOD PRESSURE 80-89 MM HG: ICD-10-PCS | Mod: CPTII,S$GLB,, | Performed by: NURSE PRACTITIONER

## 2023-01-01 PROCEDURE — 63600175 PHARM REV CODE 636 W HCPCS: Mod: JG | Performed by: STUDENT IN AN ORGANIZED HEALTH CARE EDUCATION/TRAINING PROGRAM

## 2023-01-01 PROCEDURE — 87040 BLOOD CULTURE FOR BACTERIA: CPT | Performed by: INTERNAL MEDICINE

## 2023-01-01 PROCEDURE — A4216 STERILE WATER/SALINE, 10 ML: HCPCS | Performed by: NURSE PRACTITIONER

## 2023-01-01 PROCEDURE — 99232 SBSQ HOSP IP/OBS MODERATE 35: CPT | Mod: ,,, | Performed by: STUDENT IN AN ORGANIZED HEALTH CARE EDUCATION/TRAINING PROGRAM

## 2023-01-01 PROCEDURE — 1160F RVW MEDS BY RX/DR IN RCRD: CPT | Mod: CPTII,95,, | Performed by: NURSE PRACTITIONER

## 2023-01-01 PROCEDURE — 86318 IA INFECTIOUS AGENT ANTIBODY: CPT | Performed by: INTERNAL MEDICINE

## 2023-01-01 PROCEDURE — S0030 INJECTION, METRONIDAZOLE: HCPCS | Performed by: INTERNAL MEDICINE

## 2023-01-01 PROCEDURE — 99215 OFFICE O/P EST HI 40 MIN: CPT | Mod: 95,,, | Performed by: NURSE PRACTITIONER

## 2023-01-01 PROCEDURE — 3008F PR BODY MASS INDEX (BMI) DOCUMENTED: ICD-10-PCS | Mod: CPTII,S$GLB,, | Performed by: SURGERY

## 2023-01-01 PROCEDURE — A9552 F18 FDG: HCPCS

## 2023-01-01 PROCEDURE — 3078F PR MOST RECENT DIASTOLIC BLOOD PRESSURE < 80 MM HG: ICD-10-PCS | Mod: CPTII,S$GLB,, | Performed by: SURGERY

## 2023-01-01 PROCEDURE — 99233 PR SUBSEQUENT HOSPITAL CARE,LEVL III: ICD-10-PCS | Mod: ,,, | Performed by: STUDENT IN AN ORGANIZED HEALTH CARE EDUCATION/TRAINING PROGRAM

## 2023-01-01 PROCEDURE — 77372 SRS LINEAR BASED: CPT | Performed by: RADIOLOGY

## 2023-01-01 PROCEDURE — 87493 C DIFF AMPLIFIED PROBE: CPT | Performed by: INTERNAL MEDICINE

## 2023-01-01 PROCEDURE — 25000003 PHARM REV CODE 250: Performed by: SURGERY

## 2023-01-01 PROCEDURE — 83735 ASSAY OF MAGNESIUM: CPT | Performed by: INTERNAL MEDICINE

## 2023-01-01 PROCEDURE — 99214 PR OFFICE/OUTPT VISIT, EST, LEVL IV, 30-39 MIN: ICD-10-PCS | Mod: S$PBB,,, | Performed by: NURSE PRACTITIONER

## 2023-01-01 PROCEDURE — 3075F PR MOST RECENT SYSTOLIC BLOOD PRESS GE 130-139MM HG: ICD-10-PCS | Mod: CPTII,S$GLB,, | Performed by: SURGERY

## 2023-01-01 PROCEDURE — 96372 THER/PROPH/DIAG INJ SC/IM: CPT | Mod: 59

## 2023-01-01 PROCEDURE — 3075F SYST BP GE 130 - 139MM HG: CPT | Mod: CPTII,S$GLB,, | Performed by: SURGERY

## 2023-01-01 PROCEDURE — 94640 AIRWAY INHALATION TREATMENT: CPT

## 2023-01-01 PROCEDURE — 63600175 PHARM REV CODE 636 W HCPCS: Performed by: ANESTHESIOLOGY

## 2023-01-01 PROCEDURE — 71000015 HC POSTOP RECOV 1ST HR: Performed by: SURGERY

## 2023-01-01 PROCEDURE — 3008F BODY MASS INDEX DOCD: CPT | Mod: CPTII,S$GLB,, | Performed by: NURSE PRACTITIONER

## 2023-01-01 PROCEDURE — 76604 US EXAM CHEST: CPT | Mod: TC

## 2023-01-01 PROCEDURE — 36000706: Performed by: SURGERY

## 2023-01-01 PROCEDURE — 80053 COMPREHEN METABOLIC PANEL: CPT | Performed by: STUDENT IN AN ORGANIZED HEALTH CARE EDUCATION/TRAINING PROGRAM

## 2023-01-01 PROCEDURE — 85060 BLOOD SMEAR INTERPRETATION: CPT | Performed by: INTERNAL MEDICINE

## 2023-01-01 PROCEDURE — 96374 THER/PROPH/DIAG INJ IV PUSH: CPT

## 2023-01-01 PROCEDURE — 77338 DESIGN MLC DEVICE FOR IMRT: CPT | Performed by: RADIOLOGY

## 2023-01-01 PROCEDURE — 99215 PR OFFICE/OUTPT VISIT, EST, LEVL V, 40-54 MIN: ICD-10-PCS | Mod: S$GLB,,, | Performed by: NURSE PRACTITIONER

## 2023-01-01 PROCEDURE — 97116 GAIT TRAINING THERAPY: CPT | Mod: CQ

## 2023-01-01 PROCEDURE — 99222 PR INITIAL HOSPITAL CARE,LEVL II: ICD-10-PCS | Mod: ,,, | Performed by: STUDENT IN AN ORGANIZED HEALTH CARE EDUCATION/TRAINING PROGRAM

## 2023-01-01 PROCEDURE — 85610 PROTHROMBIN TIME: CPT | Performed by: STUDENT IN AN ORGANIZED HEALTH CARE EDUCATION/TRAINING PROGRAM

## 2023-01-01 PROCEDURE — 84100 ASSAY OF PHOSPHORUS: CPT

## 2023-01-01 PROCEDURE — 80053 COMPREHEN METABOLIC PANEL: CPT | Performed by: NURSE PRACTITIONER

## 2023-01-01 PROCEDURE — 71000033 HC RECOVERY, INTIAL HOUR: Performed by: SURGERY

## 2023-01-01 PROCEDURE — 99499 NO LOS: ICD-10-PCS | Mod: 95,,, | Performed by: NURSE PRACTITIONER

## 2023-01-01 PROCEDURE — 82272 OCCULT BLD FECES 1-3 TESTS: CPT | Performed by: INTERNAL MEDICINE

## 2023-01-01 PROCEDURE — 78815 PET IMAGE W/CT SKULL-THIGH: CPT | Mod: TC

## 2023-01-01 PROCEDURE — 77300 RADIATION THERAPY DOSE PLAN: CPT | Performed by: RADIOLOGY

## 2023-01-01 PROCEDURE — 1160F RVW MEDS BY RX/DR IN RCRD: CPT | Mod: CPTII,,, | Performed by: NURSE PRACTITIONER

## 2023-01-01 PROCEDURE — 83550 IRON BINDING TEST: CPT | Performed by: INTERNAL MEDICINE

## 2023-01-01 PROCEDURE — 70450 CT HEAD/BRAIN W/O DYE: CPT | Mod: TC

## 2023-01-01 PROCEDURE — 77049 MRI BREAST C-+ W/CAD BI: CPT | Mod: TC

## 2023-01-01 PROCEDURE — 99215 OFFICE O/P EST HI 40 MIN: CPT | Mod: 95,,, | Performed by: STUDENT IN AN ORGANIZED HEALTH CARE EDUCATION/TRAINING PROGRAM

## 2023-01-01 PROCEDURE — 83735 ASSAY OF MAGNESIUM: CPT

## 2023-01-01 PROCEDURE — 63600175 PHARM REV CODE 636 W HCPCS: Performed by: NURSE ANESTHETIST, CERTIFIED REGISTERED

## 2023-01-01 PROCEDURE — 99222 PR INITIAL HOSPITAL CARE,LEVL II: ICD-10-PCS | Mod: ,,, | Performed by: NURSE PRACTITIONER

## 2023-01-01 PROCEDURE — 99999 PR PBB SHADOW E&M-EST. PATIENT-LVL IV: CPT | Mod: PBBFAC,,, | Performed by: NURSE PRACTITIONER

## 2023-01-01 PROCEDURE — A4216 STERILE WATER/SALINE, 10 ML: HCPCS | Performed by: STUDENT IN AN ORGANIZED HEALTH CARE EDUCATION/TRAINING PROGRAM

## 2023-01-01 PROCEDURE — 1159F PR MEDICATION LIST DOCUMENTED IN MEDICAL RECORD: ICD-10-PCS | Mod: CPTII,S$GLB,, | Performed by: SURGERY

## 2023-01-01 PROCEDURE — 96361 HYDRATE IV INFUSION ADD-ON: CPT

## 2023-01-01 PROCEDURE — 1160F PR REVIEW ALL MEDS BY PRESCRIBER/CLIN PHARMACIST DOCUMENTED: ICD-10-PCS | Mod: CPTII,,, | Performed by: NURSE PRACTITIONER

## 2023-01-01 PROCEDURE — 96377 APPLICATON ON-BODY INJECTOR: CPT

## 2023-01-01 PROCEDURE — 86900 BLOOD TYPING SEROLOGIC ABO: CPT | Performed by: INTERNAL MEDICINE

## 2023-01-01 PROCEDURE — 84484 ASSAY OF TROPONIN QUANT: CPT | Performed by: NURSE PRACTITIONER

## 2023-01-01 PROCEDURE — 97110 THERAPEUTIC EXERCISES: CPT | Mod: CQ

## 2023-01-01 PROCEDURE — 99222 1ST HOSP IP/OBS MODERATE 55: CPT | Mod: ,,, | Performed by: NURSE PRACTITIONER

## 2023-01-01 PROCEDURE — 1159F PR MEDICATION LIST DOCUMENTED IN MEDICAL RECORD: ICD-10-PCS | Mod: CPTII,95,, | Performed by: STUDENT IN AN ORGANIZED HEALTH CARE EDUCATION/TRAINING PROGRAM

## 2023-01-01 PROCEDURE — A9577 INJ MULTIHANCE: HCPCS | Performed by: SURGERY

## 2023-01-01 PROCEDURE — 99222 1ST HOSP IP/OBS MODERATE 55: CPT | Mod: ,,, | Performed by: STUDENT IN AN ORGANIZED HEALTH CARE EDUCATION/TRAINING PROGRAM

## 2023-01-01 PROCEDURE — 85025 COMPLETE CBC W/AUTO DIFF WBC: CPT | Performed by: NURSE PRACTITIONER

## 2023-01-01 PROCEDURE — 93971 EXTREMITY STUDY: CPT | Mod: 26,RT,, | Performed by: SURGERY

## 2023-01-01 PROCEDURE — 71000016 HC POSTOP RECOV ADDL HR: Performed by: SURGERY

## 2023-01-01 PROCEDURE — 99223 1ST HOSP IP/OBS HIGH 75: CPT | Mod: ,,, | Performed by: INTERNAL MEDICINE

## 2023-01-01 PROCEDURE — 1160F RVW MEDS BY RX/DR IN RCRD: CPT | Mod: CPTII,S$GLB,, | Performed by: NURSE PRACTITIONER

## 2023-01-01 PROCEDURE — 1159F MED LIST DOCD IN RCRD: CPT | Mod: CPTII,,, | Performed by: NURSE PRACTITIONER

## 2023-01-01 PROCEDURE — 84157 ASSAY OF PROTEIN OTHER: CPT | Performed by: STUDENT IN AN ORGANIZED HEALTH CARE EDUCATION/TRAINING PROGRAM

## 2023-01-01 PROCEDURE — 99223 PR INITIAL HOSPITAL CARE,LEVL III: ICD-10-PCS | Mod: ,,, | Performed by: INTERNAL MEDICINE

## 2023-01-01 PROCEDURE — 25500020 PHARM REV CODE 255: Performed by: SURGERY

## 2023-01-01 PROCEDURE — 89051 BODY FLUID CELL COUNT: CPT | Performed by: STUDENT IN AN ORGANIZED HEALTH CARE EDUCATION/TRAINING PROGRAM

## 2023-01-01 PROCEDURE — 36561 INSERT TUNNELED CV CATH: CPT | Mod: LT,,, | Performed by: SURGERY

## 2023-01-01 PROCEDURE — 3078F DIAST BP <80 MM HG: CPT | Mod: CPTII,S$GLB,, | Performed by: SURGERY

## 2023-01-01 PROCEDURE — 99999 PR PBB SHADOW E&M-EST. PATIENT-LVL V: ICD-10-PCS | Mod: PBBFAC,,, | Performed by: SURGERY

## 2023-01-01 PROCEDURE — 93306 TTE W/DOPPLER COMPLETE: CPT

## 2023-01-01 PROCEDURE — A9577 INJ MULTIHANCE: HCPCS | Performed by: INTERNAL MEDICINE

## 2023-01-01 PROCEDURE — 37000008 HC ANESTHESIA 1ST 15 MINUTES: Performed by: SURGERY

## 2023-01-01 PROCEDURE — 99212 OFFICE O/P EST SF 10 MIN: CPT | Mod: PBBFAC | Performed by: NURSE PRACTITIONER

## 2023-01-01 PROCEDURE — 99214 OFFICE O/P EST MOD 30 MIN: CPT | Mod: S$PBB,,, | Performed by: NURSE PRACTITIONER

## 2023-01-01 PROCEDURE — 93306 TTE W/DOPPLER COMPLETE: CPT | Mod: 26,,, | Performed by: INTERNAL MEDICINE

## 2023-01-01 PROCEDURE — 99215 PR OFFICE/OUTPT VISIT, EST, LEVL V, 40-54 MIN: ICD-10-PCS | Mod: 95,,, | Performed by: STUDENT IN AN ORGANIZED HEALTH CARE EDUCATION/TRAINING PROGRAM

## 2023-01-01 PROCEDURE — A9577 INJ MULTIHANCE: HCPCS | Performed by: STUDENT IN AN ORGANIZED HEALTH CARE EDUCATION/TRAINING PROGRAM

## 2023-01-01 PROCEDURE — 85730 THROMBOPLASTIN TIME PARTIAL: CPT | Performed by: INTERNAL MEDICINE

## 2023-01-01 PROCEDURE — 1159F PR MEDICATION LIST DOCUMENTED IN MEDICAL RECORD: ICD-10-PCS | Mod: CPTII,,, | Performed by: NURSE PRACTITIONER

## 2023-01-01 PROCEDURE — 25000003 PHARM REV CODE 250: Performed by: NURSE PRACTITIONER

## 2023-01-01 PROCEDURE — 84484 ASSAY OF TROPONIN QUANT: CPT | Performed by: INTERNAL MEDICINE

## 2023-01-01 PROCEDURE — 89055 LEUKOCYTE ASSESSMENT FECAL: CPT | Performed by: INTERNAL MEDICINE

## 2023-01-01 PROCEDURE — 93971 EXTREMITY STUDY: CPT | Mod: RT

## 2023-01-01 PROCEDURE — 37000009 HC ANESTHESIA EA ADD 15 MINS: Performed by: SURGERY

## 2023-01-01 PROCEDURE — 0240U COVID/FLU A&B PCR: CPT | Performed by: INTERNAL MEDICINE

## 2023-01-01 PROCEDURE — 99205 OFFICE O/P NEW HI 60 MIN: CPT | Mod: S$GLB,,, | Performed by: SURGERY

## 2023-01-01 PROCEDURE — 25000003 PHARM REV CODE 250: Performed by: NURSE ANESTHETIST, CERTIFIED REGISTERED

## 2023-01-01 PROCEDURE — 85730 THROMBOPLASTIN TIME PARTIAL: CPT | Performed by: STUDENT IN AN ORGANIZED HEALTH CARE EDUCATION/TRAINING PROGRAM

## 2023-01-01 PROCEDURE — 77001 CHG FLUOROGUIDE CNTRL VEN ACCESS,PLACE,REPLACE,REMOVE: ICD-10-PCS | Mod: 26,,, | Performed by: SURGERY

## 2023-01-01 PROCEDURE — 1159F PR MEDICATION LIST DOCUMENTED IN MEDICAL RECORD: ICD-10-PCS | Mod: CPTII,95,, | Performed by: NURSE PRACTITIONER

## 2023-01-01 PROCEDURE — 84443 ASSAY THYROID STIM HORMONE: CPT | Performed by: INTERNAL MEDICINE

## 2023-01-01 PROCEDURE — 36600 WITHDRAWAL OF ARTERIAL BLOOD: CPT

## 2023-01-01 PROCEDURE — 25000003 PHARM REV CODE 250

## 2023-01-01 PROCEDURE — 82945 GLUCOSE OTHER FLUID: CPT | Performed by: STUDENT IN AN ORGANIZED HEALTH CARE EDUCATION/TRAINING PROGRAM

## 2023-01-01 PROCEDURE — 99205 PR OFFICE/OUTPT VISIT, NEW, LEVL V, 60-74 MIN: ICD-10-PCS | Mod: S$GLB,,, | Performed by: STUDENT IN AN ORGANIZED HEALTH CARE EDUCATION/TRAINING PROGRAM

## 2023-01-01 PROCEDURE — 76536 US EXAM OF HEAD AND NECK: CPT | Mod: TC

## 2023-01-01 PROCEDURE — 93971 CV US DOPPLER VENOUS ARM RIGHT (CUPID ONLY): ICD-10-PCS | Mod: 26,RT,, | Performed by: SURGERY

## 2023-01-01 PROCEDURE — 36000707: Performed by: SURGERY

## 2023-01-01 PROCEDURE — 99285 EMERGENCY DEPT VISIT HI MDM: CPT | Mod: 25

## 2023-01-01 PROCEDURE — 1159F MED LIST DOCD IN RCRD: CPT | Mod: CPTII,S$GLB,, | Performed by: NURSE PRACTITIONER

## 2023-01-01 PROCEDURE — 3074F SYST BP LT 130 MM HG: CPT | Mod: CPTII,S$GLB,, | Performed by: NURSE PRACTITIONER

## 2023-01-01 PROCEDURE — 83880 ASSAY OF NATRIURETIC PEPTIDE: CPT | Performed by: NURSE PRACTITIONER

## 2023-01-01 PROCEDURE — 1160F PR REVIEW ALL MEDS BY PRESCRIBER/CLIN PHARMACIST DOCUMENTED: ICD-10-PCS | Mod: CPTII,S$GLB,, | Performed by: SURGERY

## 2023-01-01 PROCEDURE — 99215 OFFICE O/P EST HI 40 MIN: CPT | Mod: S$GLB,,, | Performed by: NURSE PRACTITIONER

## 2023-01-01 PROCEDURE — 86255 FLUORESCENT ANTIBODY SCREEN: CPT | Mod: 90 | Performed by: STUDENT IN AN ORGANIZED HEALTH CARE EDUCATION/TRAINING PROGRAM

## 2023-01-01 DEVICE — PORT POWER SLIM: Type: IMPLANTABLE DEVICE | Site: CHEST  WALL | Status: FUNCTIONAL

## 2023-01-01 RX ORDER — HEPARIN 100 UNIT/ML
500 SYRINGE INTRAVENOUS
Status: CANCELLED | OUTPATIENT
Start: 2023-01-01

## 2023-01-01 RX ORDER — CEFAZOLIN SODIUM 2 G/50ML
2 SOLUTION INTRAVENOUS
Status: DISCONTINUED | OUTPATIENT
Start: 2023-01-01 | End: 2023-01-01 | Stop reason: HOSPADM

## 2023-01-01 RX ORDER — SODIUM CHLORIDE 0.9 % (FLUSH) 0.9 %
10 SYRINGE (ML) INJECTION
Status: DISCONTINUED | OUTPATIENT
Start: 2023-01-01 | End: 2023-01-01 | Stop reason: HOSPADM

## 2023-01-01 RX ORDER — TALC
3 POWDER (GRAM) TOPICAL NIGHTLY PRN
Qty: 30 TABLET | Refills: 0 | Status: ON HOLD | OUTPATIENT
Start: 2023-01-01 | End: 2023-01-01

## 2023-01-01 RX ORDER — HEPARIN 100 UNIT/ML
500 SYRINGE INTRAVENOUS
Status: DISCONTINUED | OUTPATIENT
Start: 2023-01-01 | End: 2023-01-01 | Stop reason: HOSPADM

## 2023-01-01 RX ORDER — DOCUSATE SODIUM 100 MG/1
100 CAPSULE, LIQUID FILLED ORAL 2 TIMES DAILY
Status: DISCONTINUED | OUTPATIENT
Start: 2023-01-01 | End: 2023-01-01 | Stop reason: HOSPADM

## 2023-01-01 RX ORDER — MORPHINE SULFATE 4 MG/ML
4 INJECTION, SOLUTION INTRAMUSCULAR; INTRAVENOUS
Status: DISCONTINUED | OUTPATIENT
Start: 2023-01-01 | End: 2023-01-01 | Stop reason: HOSPADM

## 2023-01-01 RX ORDER — TRAMADOL HYDROCHLORIDE 50 MG/1
50 TABLET ORAL EVERY 4 HOURS PRN
Status: DISCONTINUED | OUTPATIENT
Start: 2023-01-01 | End: 2023-01-01 | Stop reason: HOSPADM

## 2023-01-01 RX ORDER — SODIUM CHLORIDE 0.9 % (FLUSH) 0.9 %
10 SYRINGE (ML) INJECTION
Status: CANCELLED | OUTPATIENT
Start: 2023-01-01

## 2023-01-01 RX ORDER — ENOXAPARIN SODIUM 100 MG/ML
40 INJECTION SUBCUTANEOUS EVERY 24 HOURS
Status: DISCONTINUED | OUTPATIENT
Start: 2023-01-01 | End: 2023-01-01 | Stop reason: HOSPADM

## 2023-01-01 RX ORDER — HALOPERIDOL 5 MG/ML
2 INJECTION INTRAMUSCULAR ONCE
Status: COMPLETED | OUTPATIENT
Start: 2023-01-01 | End: 2023-01-01

## 2023-01-01 RX ORDER — MORPHINE SULFATE 15 MG/1
15 TABLET, FILM COATED, EXTENDED RELEASE ORAL 2 TIMES DAILY
Status: DISCONTINUED | OUTPATIENT
Start: 2023-01-01 | End: 2023-01-01

## 2023-01-01 RX ORDER — ONDANSETRON 2 MG/ML
4 INJECTION INTRAMUSCULAR; INTRAVENOUS EVERY 8 HOURS PRN
Status: DISCONTINUED | OUTPATIENT
Start: 2023-01-01 | End: 2023-01-01 | Stop reason: HOSPADM

## 2023-01-01 RX ORDER — OXYCODONE HYDROCHLORIDE 5 MG/1
10 TABLET ORAL EVERY 8 HOURS PRN
Status: DISCONTINUED | OUTPATIENT
Start: 2023-01-01 | End: 2023-01-01

## 2023-01-01 RX ORDER — DOCUSATE SODIUM 100 MG/1
100 CAPSULE, LIQUID FILLED ORAL 2 TIMES DAILY
Qty: 90 CAPSULE | Refills: 0 | Status: ON HOLD | OUTPATIENT
Start: 2023-01-01 | End: 2023-01-01

## 2023-01-01 RX ORDER — MECLIZINE HYDROCHLORIDE 25 MG/1
25 TABLET ORAL 3 TIMES DAILY PRN
Status: DISCONTINUED | OUTPATIENT
Start: 2023-01-01 | End: 2023-01-01 | Stop reason: HOSPADM

## 2023-01-01 RX ORDER — PROCHLORPERAZINE EDISYLATE 5 MG/ML
5 INJECTION INTRAMUSCULAR; INTRAVENOUS EVERY 30 MIN PRN
Status: DISCONTINUED | OUTPATIENT
Start: 2023-01-01 | End: 2023-01-01 | Stop reason: HOSPADM

## 2023-01-01 RX ORDER — ONDANSETRON 2 MG/ML
4 INJECTION INTRAMUSCULAR; INTRAVENOUS EVERY 12 HOURS PRN
Status: CANCELLED | OUTPATIENT
Start: 2023-01-01

## 2023-01-01 RX ORDER — ZOLEDRONIC ACID 4 MG/100ML
4 SOLUTION INTRAVENOUS
Status: COMPLETED | OUTPATIENT
Start: 2023-01-01 | End: 2023-01-01

## 2023-01-01 RX ORDER — DEXAMETHASONE 4 MG/1
8 TABLET ORAL EVERY 12 HOURS
Qty: 60 TABLET | Refills: 1 | Status: ON HOLD | OUTPATIENT
Start: 2023-01-01 | End: 2023-01-01 | Stop reason: HOSPADM

## 2023-01-01 RX ORDER — HEPARIN SODIUM 5000 [USP'U]/ML
INJECTION, SOLUTION INTRAVENOUS; SUBCUTANEOUS
Status: DISCONTINUED | OUTPATIENT
Start: 2023-01-01 | End: 2023-01-01 | Stop reason: HOSPADM

## 2023-01-01 RX ORDER — THIAMINE HCL 100 MG
100 TABLET ORAL DAILY
Status: DISCONTINUED | OUTPATIENT
Start: 2023-01-01 | End: 2023-01-01 | Stop reason: HOSPADM

## 2023-01-01 RX ORDER — MORPHINE SULFATE 15 MG/1
15 TABLET, FILM COATED, EXTENDED RELEASE ORAL 2 TIMES DAILY
Qty: 20 TABLET | Refills: 0 | Status: ON HOLD | OUTPATIENT
Start: 2023-01-01 | End: 2023-01-01

## 2023-01-01 RX ORDER — AMOXICILLIN 250 MG
2 CAPSULE ORAL 2 TIMES DAILY PRN
Status: DISCONTINUED | OUTPATIENT
Start: 2023-01-01 | End: 2023-01-01 | Stop reason: HOSPADM

## 2023-01-01 RX ORDER — SEVOFLURANE 250 ML/250ML
LIQUID RESPIRATORY (INHALATION)
Status: DISCONTINUED
Start: 2023-01-01 | End: 2023-01-01 | Stop reason: HOSPADM

## 2023-01-01 RX ORDER — BUPIVACAINE HYDROCHLORIDE 5 MG/ML
INJECTION, SOLUTION EPIDURAL; INTRACAUDAL
Status: DISCONTINUED
Start: 2023-01-01 | End: 2023-01-01 | Stop reason: HOSPADM

## 2023-01-01 RX ORDER — TALC
6 POWDER (GRAM) TOPICAL NIGHTLY PRN
Status: DISCONTINUED | OUTPATIENT
Start: 2023-01-01 | End: 2023-01-01 | Stop reason: HOSPADM

## 2023-01-01 RX ORDER — KETOROLAC TROMETHAMINE 10 MG/1
10 TABLET, FILM COATED ORAL EVERY 6 HOURS PRN
Qty: 15 TABLET | Refills: 0 | Status: SHIPPED | OUTPATIENT
Start: 2023-01-01 | End: 2023-01-01

## 2023-01-01 RX ORDER — BUPIVACAINE HYDROCHLORIDE 5 MG/ML
INJECTION, SOLUTION EPIDURAL; INTRACAUDAL
Status: DISCONTINUED | OUTPATIENT
Start: 2023-01-01 | End: 2023-01-01 | Stop reason: HOSPADM

## 2023-01-01 RX ORDER — FAMOTIDINE 20 MG/1
20 TABLET, FILM COATED ORAL 2 TIMES DAILY
Status: DISCONTINUED | OUTPATIENT
Start: 2023-01-01 | End: 2023-01-01 | Stop reason: HOSPADM

## 2023-01-01 RX ORDER — GABAPENTIN 300 MG/1
600 CAPSULE ORAL ONCE
Status: COMPLETED | OUTPATIENT
Start: 2023-01-01 | End: 2023-01-01

## 2023-01-01 RX ORDER — SODIUM CHLORIDE 0.9 % (FLUSH) 0.9 %
10 SYRINGE (ML) INJECTION EVERY 6 HOURS
Status: DISCONTINUED | OUTPATIENT
Start: 2023-01-01 | End: 2023-01-01 | Stop reason: HOSPADM

## 2023-01-01 RX ORDER — ONDANSETRON 2 MG/ML
8 INJECTION INTRAMUSCULAR; INTRAVENOUS
Status: CANCELLED | OUTPATIENT
Start: 2023-01-01

## 2023-01-01 RX ORDER — SCOLOPAMINE TRANSDERMAL SYSTEM 1 MG/1
1 PATCH, EXTENDED RELEASE TRANSDERMAL ONCE
Status: DISCONTINUED | OUTPATIENT
Start: 2023-01-01 | End: 2023-01-01 | Stop reason: HOSPADM

## 2023-01-01 RX ORDER — HYDROCODONE BITARTRATE AND ACETAMINOPHEN 10; 325 MG/1; MG/1
1 TABLET ORAL EVERY 6 HOURS PRN
Qty: 60 TABLET | Refills: 0 | Status: SHIPPED | OUTPATIENT
Start: 2023-01-01 | End: 2023-01-01

## 2023-01-01 RX ORDER — LIDOCAINE HYDROCHLORIDE 10 MG/ML
1 INJECTION, SOLUTION EPIDURAL; INFILTRATION; INTRACAUDAL; PERINEURAL ONCE
Status: DISCONTINUED | OUTPATIENT
Start: 2023-01-01 | End: 2023-01-01 | Stop reason: HOSPADM

## 2023-01-01 RX ORDER — MIDODRINE HYDROCHLORIDE 5 MG/1
5 TABLET ORAL
Status: DISCONTINUED | OUTPATIENT
Start: 2023-01-01 | End: 2023-01-01 | Stop reason: HOSPADM

## 2023-01-01 RX ORDER — TALC
3 POWDER (GRAM) TOPICAL NIGHTLY PRN
Status: DISCONTINUED | OUTPATIENT
Start: 2023-01-01 | End: 2023-01-01

## 2023-01-01 RX ORDER — PROCHLORPERAZINE MALEATE 10 MG
10 TABLET ORAL EVERY 6 HOURS PRN
Qty: 30 TABLET | Refills: 3 | Status: ON HOLD | OUTPATIENT
Start: 2023-01-01 | End: 2023-01-01

## 2023-01-01 RX ORDER — NAPROXEN SODIUM 220 MG
220 TABLET ORAL
Status: ON HOLD | COMMUNITY
End: 2023-01-01

## 2023-01-01 RX ORDER — OXYCODONE HYDROCHLORIDE 5 MG/1
10 TABLET ORAL EVERY 4 HOURS PRN
Status: DISCONTINUED | OUTPATIENT
Start: 2023-01-01 | End: 2023-01-01

## 2023-01-01 RX ORDER — MAG HYDROX/ALUMINUM HYD/SIMETH 200-200-20
30 SUSPENSION, ORAL (FINAL DOSE FORM) ORAL 4 TIMES DAILY PRN
Status: DISCONTINUED | OUTPATIENT
Start: 2023-01-01 | End: 2023-01-01 | Stop reason: HOSPADM

## 2023-01-01 RX ORDER — LIDOCAINE HYDROCHLORIDE 10 MG/ML
INJECTION, SOLUTION EPIDURAL; INFILTRATION; INTRACAUDAL; PERINEURAL
Status: DISCONTINUED | OUTPATIENT
Start: 2023-01-01 | End: 2023-01-01

## 2023-01-01 RX ORDER — MECLIZINE HYDROCHLORIDE 25 MG/1
25 TABLET ORAL 3 TIMES DAILY PRN
Qty: 90 TABLET | Refills: 1 | Status: ON HOLD | OUTPATIENT
Start: 2023-01-01 | End: 2023-01-01

## 2023-01-01 RX ORDER — MORPHINE SULFATE 4 MG/ML
4 INJECTION, SOLUTION INTRAMUSCULAR; INTRAVENOUS EVERY 4 HOURS PRN
Status: DISCONTINUED | OUTPATIENT
Start: 2023-01-01 | End: 2023-01-01

## 2023-01-01 RX ORDER — OLANZAPINE 5 MG/1
5 TABLET ORAL NIGHTLY
Qty: 30 TABLET | Refills: 1 | Status: CANCELLED | OUTPATIENT
Start: 2023-01-01

## 2023-01-01 RX ORDER — OLANZAPINE 5 MG/1
5 TABLET ORAL NIGHTLY
Qty: 30 TABLET | Refills: 1 | Status: ON HOLD | OUTPATIENT
Start: 2023-01-01 | End: 2023-01-01

## 2023-01-01 RX ORDER — HYDROCODONE BITARTRATE AND ACETAMINOPHEN 5; 325 MG/1; MG/1
1 TABLET ORAL EVERY 6 HOURS PRN
Qty: 90 TABLET | Refills: 0 | Status: SHIPPED | OUTPATIENT
Start: 2023-01-01 | End: 2023-01-01

## 2023-01-01 RX ORDER — FUROSEMIDE 10 MG/ML
80 INJECTION INTRAMUSCULAR; INTRAVENOUS
Status: COMPLETED | OUTPATIENT
Start: 2023-01-01 | End: 2023-01-01

## 2023-01-01 RX ORDER — OLANZAPINE 5 MG/1
5 TABLET ORAL NIGHTLY
Status: COMPLETED | OUTPATIENT
Start: 2023-01-01 | End: 2023-01-01

## 2023-01-01 RX ORDER — AMOXICILLIN 250 MG
2 CAPSULE ORAL 2 TIMES DAILY PRN
Qty: 30 TABLET | Refills: 0 | Status: ON HOLD | OUTPATIENT
Start: 2023-01-01 | End: 2023-01-01

## 2023-01-01 RX ORDER — METRONIDAZOLE 500 MG/100ML
500 INJECTION, SOLUTION INTRAVENOUS
Status: DISCONTINUED | OUTPATIENT
Start: 2023-01-01 | End: 2023-01-01

## 2023-01-01 RX ORDER — OXYCODONE HYDROCHLORIDE 5 MG/1
10 TABLET ORAL EVERY 4 HOURS PRN
Status: DISCONTINUED | OUTPATIENT
Start: 2023-01-01 | End: 2023-01-01 | Stop reason: HOSPADM

## 2023-01-01 RX ORDER — DEXAMETHASONE 4 MG/1
8 TABLET ORAL DAILY
Status: COMPLETED | OUTPATIENT
Start: 2023-01-01 | End: 2023-01-01

## 2023-01-01 RX ORDER — HYDROMORPHONE HYDROCHLORIDE 2 MG/ML
0.4 INJECTION, SOLUTION INTRAMUSCULAR; INTRAVENOUS; SUBCUTANEOUS EVERY 5 MIN PRN
Status: DISCONTINUED | OUTPATIENT
Start: 2023-01-01 | End: 2023-01-01 | Stop reason: HOSPADM

## 2023-01-01 RX ORDER — MORPHINE SULFATE 15 MG/1
15 TABLET, FILM COATED, EXTENDED RELEASE ORAL EVERY 8 HOURS PRN
Qty: 60 TABLET | Refills: 0 | Status: ON HOLD | OUTPATIENT
Start: 2023-01-01 | End: 2023-01-01

## 2023-01-01 RX ORDER — POLYETHYLENE GLYCOL 3350 17 G/17G
17 POWDER, FOR SOLUTION ORAL 2 TIMES DAILY PRN
Status: DISCONTINUED | OUTPATIENT
Start: 2023-01-01 | End: 2023-01-01 | Stop reason: HOSPADM

## 2023-01-01 RX ORDER — SODIUM CHLORIDE, SODIUM GLUCONATE, SODIUM ACETATE, POTASSIUM CHLORIDE AND MAGNESIUM CHLORIDE 30; 37; 368; 526; 502 MG/100ML; MG/100ML; MG/100ML; MG/100ML; MG/100ML
INJECTION, SOLUTION INTRAVENOUS CONTINUOUS
Status: DISCONTINUED | OUTPATIENT
Start: 2023-01-01 | End: 2023-01-01 | Stop reason: HOSPADM

## 2023-01-01 RX ORDER — ONDANSETRON 2 MG/ML
4 INJECTION INTRAMUSCULAR; INTRAVENOUS DAILY PRN
Status: DISCONTINUED | OUTPATIENT
Start: 2023-01-01 | End: 2023-01-01 | Stop reason: HOSPADM

## 2023-01-01 RX ORDER — HYDROMORPHONE HYDROCHLORIDE 2 MG/ML
0.2 INJECTION, SOLUTION INTRAMUSCULAR; INTRAVENOUS; SUBCUTANEOUS EVERY 5 MIN PRN
Status: DISCONTINUED | OUTPATIENT
Start: 2023-01-01 | End: 2023-01-01 | Stop reason: HOSPADM

## 2023-01-01 RX ORDER — PROPOFOL 10 MG/ML
VIAL (ML) INTRAVENOUS
Status: DISCONTINUED | OUTPATIENT
Start: 2023-01-01 | End: 2023-01-01

## 2023-01-01 RX ORDER — DEXAMETHASONE SODIUM PHOSPHATE 4 MG/ML
8 INJECTION, SOLUTION INTRA-ARTICULAR; INTRALESIONAL; INTRAMUSCULAR; INTRAVENOUS; SOFT TISSUE ONCE
Status: COMPLETED | OUTPATIENT
Start: 2023-01-01 | End: 2023-01-01

## 2023-01-01 RX ORDER — PROCHLORPERAZINE EDISYLATE 5 MG/ML
5 INJECTION INTRAMUSCULAR; INTRAVENOUS EVERY 6 HOURS PRN
Status: DISCONTINUED | OUTPATIENT
Start: 2023-01-01 | End: 2023-01-01 | Stop reason: HOSPADM

## 2023-01-01 RX ORDER — HYDROMORPHONE HYDROCHLORIDE 2 MG/ML
1 INJECTION, SOLUTION INTRAMUSCULAR; INTRAVENOUS; SUBCUTANEOUS
Status: COMPLETED | OUTPATIENT
Start: 2023-01-01 | End: 2023-01-01

## 2023-01-01 RX ORDER — FAMOTIDINE 10 MG/ML
20 INJECTION INTRAVENOUS EVERY 12 HOURS
Status: DISCONTINUED | OUTPATIENT
Start: 2023-01-01 | End: 2023-01-01

## 2023-01-01 RX ORDER — LOPERAMIDE HYDROCHLORIDE 2 MG/1
2 CAPSULE ORAL
Status: DISCONTINUED | OUTPATIENT
Start: 2023-01-01 | End: 2023-01-01 | Stop reason: HOSPADM

## 2023-01-01 RX ORDER — ONDANSETRON 2 MG/ML
8 INJECTION INTRAMUSCULAR; INTRAVENOUS
Status: COMPLETED | OUTPATIENT
Start: 2023-01-01 | End: 2023-01-01

## 2023-01-01 RX ORDER — ONDANSETRON 8 MG/1
8 TABLET, ORALLY DISINTEGRATING ORAL EVERY 8 HOURS PRN
Qty: 60 TABLET | Refills: 5 | Status: ON HOLD | OUTPATIENT
Start: 2023-01-01 | End: 2023-01-01

## 2023-01-01 RX ORDER — DEXAMETHASONE 4 MG/1
8 TABLET ORAL DAILY
Qty: 24 TABLET | Refills: 5 | Status: CANCELLED | OUTPATIENT
Start: 2023-01-01

## 2023-01-01 RX ORDER — LANOLIN ALCOHOL/MO/W.PET/CERES
1 CREAM (GRAM) TOPICAL DAILY
Status: DISCONTINUED | OUTPATIENT
Start: 2023-01-01 | End: 2023-01-01 | Stop reason: HOSPADM

## 2023-01-01 RX ORDER — TRAMADOL HYDROCHLORIDE 50 MG/1
50 TABLET ORAL EVERY 6 HOURS
Qty: 90 TABLET | Refills: 0 | Status: ON HOLD | OUTPATIENT
Start: 2023-01-01 | End: 2023-01-01

## 2023-01-01 RX ORDER — MIDAZOLAM HYDROCHLORIDE 1 MG/ML
2 INJECTION INTRAMUSCULAR; INTRAVENOUS ONCE AS NEEDED
Status: COMPLETED | OUTPATIENT
Start: 2023-01-01 | End: 2023-01-01

## 2023-01-01 RX ORDER — MORPHINE SULFATE 15 MG/1
15 TABLET, FILM COATED, EXTENDED RELEASE ORAL 2 TIMES DAILY
Status: DISCONTINUED | OUTPATIENT
Start: 2023-01-01 | End: 2023-01-01 | Stop reason: HOSPADM

## 2023-01-01 RX ORDER — MORPHINE SULFATE 15 MG/1
15 TABLET, FILM COATED, EXTENDED RELEASE ORAL EVERY 8 HOURS PRN
Status: DISCONTINUED | OUTPATIENT
Start: 2023-01-01 | End: 2023-01-01

## 2023-01-01 RX ORDER — SODIUM CHLORIDE, SODIUM LACTATE, POTASSIUM CHLORIDE, CALCIUM CHLORIDE 600; 310; 30; 20 MG/100ML; MG/100ML; MG/100ML; MG/100ML
INJECTION, SOLUTION INTRAVENOUS CONTINUOUS
Status: DISCONTINUED | OUTPATIENT
Start: 2023-01-01 | End: 2023-01-01 | Stop reason: HOSPADM

## 2023-01-01 RX ORDER — SIMETHICONE 80 MG
1 TABLET,CHEWABLE ORAL 4 TIMES DAILY PRN
Status: DISCONTINUED | OUTPATIENT
Start: 2023-01-01 | End: 2023-01-01 | Stop reason: HOSPADM

## 2023-01-01 RX ORDER — CEFAZOLIN 2 G/1
INJECTION, POWDER, FOR SOLUTION INTRAMUSCULAR; INTRAVENOUS
Status: DISCONTINUED
Start: 2023-01-01 | End: 2023-01-01 | Stop reason: HOSPADM

## 2023-01-01 RX ORDER — LEVETIRACETAM 10 MG/ML
1000 INJECTION INTRAVASCULAR EVERY 12 HOURS
Status: DISCONTINUED | OUTPATIENT
Start: 2023-01-01 | End: 2023-01-01 | Stop reason: HOSPADM

## 2023-01-01 RX ORDER — ONDANSETRON 4 MG/1
8 TABLET, ORALLY DISINTEGRATING ORAL EVERY 6 HOURS PRN
Status: DISCONTINUED | OUTPATIENT
Start: 2023-01-01 | End: 2023-01-01 | Stop reason: HOSPADM

## 2023-01-01 RX ORDER — HEPARIN SODIUM 5000 [USP'U]/ML
INJECTION, SOLUTION INTRAVENOUS; SUBCUTANEOUS
Status: DISCONTINUED
Start: 2023-01-01 | End: 2023-01-01 | Stop reason: HOSPADM

## 2023-01-01 RX ORDER — SCOLOPAMINE TRANSDERMAL SYSTEM 1 MG/1
PATCH, EXTENDED RELEASE TRANSDERMAL
Status: DISCONTINUED
Start: 2023-01-01 | End: 2023-01-01 | Stop reason: HOSPADM

## 2023-01-01 RX ORDER — POLYETHYLENE GLYCOL 3350 17 G/17G
17 POWDER, FOR SOLUTION ORAL DAILY PRN
Qty: 30 PACKET | Refills: 0 | Status: ON HOLD | OUTPATIENT
Start: 2023-01-01 | End: 2023-01-01

## 2023-01-01 RX ORDER — ALBUTEROL SULFATE 0.83 MG/ML
2.5 SOLUTION RESPIRATORY (INHALATION)
Status: COMPLETED | OUTPATIENT
Start: 2023-01-01 | End: 2023-01-01

## 2023-01-01 RX ORDER — POLYETHYLENE GLYCOL 3350 17 G/17G
17 POWDER, FOR SOLUTION ORAL DAILY
Status: DISCONTINUED | OUTPATIENT
Start: 2023-01-01 | End: 2023-01-01 | Stop reason: HOSPADM

## 2023-01-01 RX ORDER — CEFAZOLIN SODIUM 1 G/3ML
INJECTION, POWDER, FOR SOLUTION INTRAMUSCULAR; INTRAVENOUS
Status: DISCONTINUED | OUTPATIENT
Start: 2023-01-01 | End: 2023-01-01

## 2023-01-01 RX ORDER — ONDANSETRON 2 MG/ML
4 INJECTION INTRAMUSCULAR; INTRAVENOUS EVERY 4 HOURS PRN
Status: DISCONTINUED | OUTPATIENT
Start: 2023-01-01 | End: 2023-01-01 | Stop reason: HOSPADM

## 2023-01-01 RX ORDER — SULFAMETHOXAZOLE AND TRIMETHOPRIM 800; 160 MG/1; MG/1
1 TABLET ORAL 2 TIMES DAILY
Qty: 20 TABLET | Refills: 0 | Status: SHIPPED | OUTPATIENT
Start: 2023-01-01 | End: 2023-01-01

## 2023-01-01 RX ORDER — ACETAMINOPHEN 325 MG/1
650 TABLET ORAL EVERY 8 HOURS PRN
Qty: 50 TABLET | Refills: 0 | Status: ON HOLD | OUTPATIENT
Start: 2023-01-01 | End: 2023-01-01

## 2023-01-01 RX ORDER — SODIUM CHLORIDE, SODIUM LACTATE, POTASSIUM CHLORIDE, CALCIUM CHLORIDE 600; 310; 30; 20 MG/100ML; MG/100ML; MG/100ML; MG/100ML
INJECTION, SOLUTION INTRAVENOUS CONTINUOUS
Status: DISCONTINUED | OUTPATIENT
Start: 2023-01-01 | End: 2023-01-01

## 2023-01-01 RX ORDER — ACETAMINOPHEN 500 MG
1000 TABLET ORAL EVERY 6 HOURS PRN
Status: DISCONTINUED | OUTPATIENT
Start: 2023-01-01 | End: 2023-01-01 | Stop reason: HOSPADM

## 2023-01-01 RX ORDER — ACETAMINOPHEN 10 MG/ML
1000 INJECTION, SOLUTION INTRAVENOUS ONCE
Status: COMPLETED | OUTPATIENT
Start: 2023-01-01 | End: 2023-01-01

## 2023-01-01 RX ORDER — MORPHINE SULFATE 4 MG/ML
1 INJECTION, SOLUTION INTRAMUSCULAR; INTRAVENOUS ONCE AS NEEDED
Status: DISCONTINUED | OUTPATIENT
Start: 2023-01-01 | End: 2023-01-01 | Stop reason: HOSPADM

## 2023-01-01 RX ORDER — OXYCODONE HYDROCHLORIDE 10 MG/1
10 TABLET ORAL EVERY 4 HOURS PRN
Qty: 90 TABLET | Refills: 0 | Status: ON HOLD | OUTPATIENT
Start: 2023-01-01 | End: 2023-01-01

## 2023-01-01 RX ORDER — ACETAMINOPHEN 325 MG/1
650 TABLET ORAL EVERY 4 HOURS PRN
Status: DISCONTINUED | OUTPATIENT
Start: 2023-01-01 | End: 2023-01-01 | Stop reason: HOSPADM

## 2023-01-01 RX ORDER — MEPERIDINE HYDROCHLORIDE 25 MG/ML
12.5 INJECTION INTRAMUSCULAR; INTRAVENOUS; SUBCUTANEOUS EVERY 10 MIN PRN
Status: DISCONTINUED | OUTPATIENT
Start: 2023-01-01 | End: 2023-01-01 | Stop reason: HOSPADM

## 2023-01-01 RX ORDER — ZOLEDRONIC ACID 4 MG/100ML
4 SOLUTION INTRAVENOUS
Status: CANCELLED | OUTPATIENT
Start: 2023-01-01

## 2023-01-01 RX ORDER — ONDANSETRON 2 MG/ML
4 INJECTION INTRAMUSCULAR; INTRAVENOUS
Status: COMPLETED | OUTPATIENT
Start: 2023-01-01 | End: 2023-01-01

## 2023-01-01 RX ORDER — POLYETHYLENE GLYCOL 3350 17 G/17G
17 POWDER, FOR SOLUTION ORAL DAILY
Qty: 10 PACKET | Refills: 0 | Status: ON HOLD | OUTPATIENT
Start: 2023-01-01 | End: 2023-01-01 | Stop reason: HOSPADM

## 2023-01-01 RX ORDER — FENTANYL CITRATE 50 UG/ML
INJECTION, SOLUTION INTRAMUSCULAR; INTRAVENOUS
Status: DISCONTINUED | OUTPATIENT
Start: 2023-01-01 | End: 2023-01-01

## 2023-01-01 RX ORDER — TRAMADOL HYDROCHLORIDE 50 MG/1
50 TABLET ORAL EVERY 6 HOURS PRN
Qty: 28 TABLET | Refills: 0 | Status: SHIPPED | OUTPATIENT
Start: 2023-01-01 | End: 2023-01-01

## 2023-01-01 RX ORDER — DEXAMETHASONE 4 MG/1
8 TABLET ORAL
Status: COMPLETED | OUTPATIENT
Start: 2023-01-01 | End: 2023-01-01

## 2023-01-01 RX ORDER — FUROSEMIDE 10 MG/ML
60 INJECTION INTRAMUSCULAR; INTRAVENOUS
Status: DISCONTINUED | OUTPATIENT
Start: 2023-01-01 | End: 2023-01-01

## 2023-01-01 RX ORDER — ONDANSETRON 8 MG/1
8 TABLET, ORALLY DISINTEGRATING ORAL EVERY 8 HOURS PRN
Qty: 60 TABLET | Refills: 5 | Status: CANCELLED | OUTPATIENT
Start: 2023-01-01

## 2023-01-01 RX ORDER — SODIUM CHLORIDE, SODIUM LACTATE, POTASSIUM CHLORIDE, CALCIUM CHLORIDE 600; 310; 30; 20 MG/100ML; MG/100ML; MG/100ML; MG/100ML
INJECTION, SOLUTION INTRAVENOUS CONTINUOUS
Status: CANCELLED | OUTPATIENT
Start: 2023-01-01

## 2023-01-01 RX ORDER — IPRATROPIUM BROMIDE AND ALBUTEROL SULFATE 2.5; .5 MG/3ML; MG/3ML
3 SOLUTION RESPIRATORY (INHALATION) EVERY 4 HOURS PRN
Status: DISCONTINUED | OUTPATIENT
Start: 2023-01-01 | End: 2023-01-01 | Stop reason: HOSPADM

## 2023-01-01 RX ORDER — MIDAZOLAM HYDROCHLORIDE 1 MG/ML
INJECTION INTRAMUSCULAR; INTRAVENOUS
Status: COMPLETED
Start: 2023-01-01 | End: 2023-01-01

## 2023-01-01 RX ORDER — MORPHINE SULFATE 15 MG/1
15 TABLET, FILM COATED, EXTENDED RELEASE ORAL 2 TIMES DAILY
Qty: 20 TABLET | Refills: 0 | Status: CANCELLED | OUTPATIENT
Start: 2023-01-01

## 2023-01-01 RX ORDER — SODIUM,POTASSIUM PHOSPHATES 280-250MG
2 POWDER IN PACKET (EA) ORAL 3 TIMES DAILY
Status: COMPLETED | OUTPATIENT
Start: 2023-01-01 | End: 2023-01-01

## 2023-01-01 RX ORDER — DEXAMETHASONE 4 MG/1
8 TABLET ORAL DAILY
Qty: 24 TABLET | Refills: 5 | Status: SHIPPED | OUTPATIENT
Start: 2023-01-01 | End: 2023-01-01 | Stop reason: SDUPTHER

## 2023-01-01 RX ORDER — IPRATROPIUM BROMIDE AND ALBUTEROL SULFATE 2.5; .5 MG/3ML; MG/3ML
3 SOLUTION RESPIRATORY (INHALATION)
Status: DISCONTINUED | OUTPATIENT
Start: 2023-01-01 | End: 2023-01-01 | Stop reason: HOSPADM

## 2023-01-01 RX ADMIN — DOCUSATE SODIUM 100 MG: 100 CAPSULE, LIQUID FILLED ORAL at 08:03

## 2023-01-01 RX ADMIN — PIPERACILLIN AND TAZOBACTAM 4.5 G: 4; .5 INJECTION, POWDER, FOR SOLUTION INTRAVENOUS; PARENTERAL at 11:04

## 2023-01-01 RX ADMIN — PIPERACILLIN AND TAZOBACTAM 4.5 G: 4; .5 INJECTION, POWDER, FOR SOLUTION INTRAVENOUS; PARENTERAL at 06:04

## 2023-01-01 RX ADMIN — CARBOPLATIN 740 MG: 10 INJECTION INTRAVENOUS at 12:01

## 2023-01-01 RX ADMIN — PIPERACILLIN AND TAZOBACTAM 4.5 G: 4; .5 INJECTION, POWDER, FOR SOLUTION INTRAVENOUS; PARENTERAL at 05:04

## 2023-01-01 RX ADMIN — MORPHINE SULFATE 4 MG: 4 INJECTION INTRAVENOUS at 11:04

## 2023-01-01 RX ADMIN — CEFTRIAXONE SODIUM 1 G: 1 INJECTION, POWDER, FOR SOLUTION INTRAMUSCULAR; INTRAVENOUS at 06:03

## 2023-01-01 RX ADMIN — GEMCITABINE HYDROCHLORIDE 2200 MG: 1 INJECTION, SOLUTION INTRAVENOUS at 12:02

## 2023-01-01 RX ADMIN — HYDROMORPHONE HYDROCHLORIDE 1 MG: 2 INJECTION, SOLUTION INTRAMUSCULAR; INTRAVENOUS; SUBCUTANEOUS at 04:03

## 2023-01-01 RX ADMIN — OXYCODONE HYDROCHLORIDE 10 MG: 5 TABLET ORAL at 09:03

## 2023-01-01 RX ADMIN — MORPHINE SULFATE 15 MG: 15 TABLET, FILM COATED, EXTENDED RELEASE ORAL at 09:04

## 2023-01-01 RX ADMIN — FERROUS SULFATE TAB 325 MG (65 MG ELEMENTAL FE) 1 EACH: 325 (65 FE) TAB at 09:04

## 2023-01-01 RX ADMIN — SODIUM CHLORIDE, PRESERVATIVE FREE 10 ML: 5 INJECTION INTRAVENOUS at 05:04

## 2023-01-01 RX ADMIN — GEMCITABINE HYDROCHLORIDE 2200 MG: 1 INJECTION, SOLUTION INTRAVENOUS at 10:03

## 2023-01-01 RX ADMIN — FILGRASTIM-SNDZ 480 MCG: 480 INJECTION, SOLUTION INTRAVENOUS; SUBCUTANEOUS at 11:03

## 2023-01-01 RX ADMIN — PIPERACILLIN AND TAZOBACTAM 4.5 G: 4; .5 INJECTION, POWDER, FOR SOLUTION INTRAVENOUS; PARENTERAL at 08:04

## 2023-01-01 RX ADMIN — ENOXAPARIN SODIUM 40 MG: 40 INJECTION SUBCUTANEOUS at 10:03

## 2023-01-01 RX ADMIN — PIPERACILLIN AND TAZOBACTAM 4.5 G: 4; .5 INJECTION, POWDER, LYOPHILIZED, FOR SOLUTION INTRAVENOUS; PARENTERAL at 08:04

## 2023-01-01 RX ADMIN — MIDODRINE HYDROCHLORIDE 5 MG: 5 TABLET ORAL at 12:04

## 2023-01-01 RX ADMIN — MIDODRINE HYDROCHLORIDE 5 MG: 5 TABLET ORAL at 05:04

## 2023-01-01 RX ADMIN — OLANZAPINE 5 MG: 5 TABLET, FILM COATED ORAL at 08:03

## 2023-01-01 RX ADMIN — IOPAMIDOL 100 ML: 755 INJECTION, SOLUTION INTRAVENOUS at 05:03

## 2023-01-01 RX ADMIN — OXYCODONE HYDROCHLORIDE 10 MG: 5 TABLET ORAL at 11:03

## 2023-01-01 RX ADMIN — PIPERACILLIN AND TAZOBACTAM 4.5 G: 4; .5 INJECTION, POWDER, FOR SOLUTION INTRAVENOUS; PARENTERAL at 09:04

## 2023-01-01 RX ADMIN — FAMOTIDINE 20 MG: 20 TABLET, FILM COATED ORAL at 08:04

## 2023-01-01 RX ADMIN — PIPERACILLIN AND TAZOBACTAM 4.5 G: 4; .5 INJECTION, POWDER, FOR SOLUTION INTRAVENOUS; PARENTERAL at 01:04

## 2023-01-01 RX ADMIN — OXYCODONE HYDROCHLORIDE 10 MG: 5 TABLET ORAL at 02:03

## 2023-01-01 RX ADMIN — GADOBENATE DIMEGLUMINE 20 ML: 529 INJECTION, SOLUTION INTRAVENOUS at 03:01

## 2023-01-01 RX ADMIN — DEXAMETHASONE 8 MG: 4 TABLET ORAL at 08:03

## 2023-01-01 RX ADMIN — ONDANSETRON 4 MG: 2 INJECTION INTRAMUSCULAR; INTRAVENOUS at 04:03

## 2023-01-01 RX ADMIN — SODIUM CHLORIDE, PRESERVATIVE FREE 10 ML: 5 INJECTION INTRAVENOUS at 11:04

## 2023-01-01 RX ADMIN — FIDAXOMICIN 200 MG: 200 TABLET, FILM COATED ORAL at 09:04

## 2023-01-01 RX ADMIN — FENTANYL CITRATE 50 MCG: 50 INJECTION, SOLUTION INTRAMUSCULAR; INTRAVENOUS at 07:01

## 2023-01-01 RX ADMIN — METRONIDAZOLE 500 MG: 5 INJECTION, SOLUTION INTRAVENOUS at 06:04

## 2023-01-01 RX ADMIN — MORPHINE SULFATE 15 MG: 15 TABLET, EXTENDED RELEASE ORAL at 08:03

## 2023-01-01 RX ADMIN — HYDROCORTISONE SODIUM SUCCINATE 100 MG: 100 INJECTION, POWDER, FOR SOLUTION INTRAMUSCULAR; INTRAVENOUS at 01:04

## 2023-01-01 RX ADMIN — MORPHINE SULFATE 4 MG: 4 INJECTION INTRAVENOUS at 07:04

## 2023-01-01 RX ADMIN — FAMOTIDINE 20 MG: 10 INJECTION INTRAVENOUS at 11:04

## 2023-01-01 RX ADMIN — PALONOSETRON 0.25 MG: 0.25 INJECTION, SOLUTION INTRAVENOUS at 11:01

## 2023-01-01 RX ADMIN — LEVETIRACETAM INJECTION 1000 MG: 10 INJECTION INTRAVENOUS at 07:04

## 2023-01-01 RX ADMIN — ENOXAPARIN SODIUM 40 MG: 40 INJECTION SUBCUTANEOUS at 04:03

## 2023-01-01 RX ADMIN — ACETAMINOPHEN 1000 MG: 10 INJECTION, SOLUTION INTRAVENOUS at 06:01

## 2023-01-01 RX ADMIN — DEXTROSE MONOHYDRATE 25 G: 25 INJECTION, SOLUTION INTRAVENOUS at 12:03

## 2023-01-01 RX ADMIN — SODIUM CHLORIDE, PRESERVATIVE FREE 10 ML: 5 INJECTION INTRAVENOUS at 06:04

## 2023-01-01 RX ADMIN — FERROUS SULFATE TAB 325 MG (65 MG ELEMENTAL FE) 1 EACH: 325 (65 FE) TAB at 08:04

## 2023-01-01 RX ADMIN — MORPHINE SULFATE 4 MG: 4 INJECTION INTRAVENOUS at 03:04

## 2023-01-01 RX ADMIN — SODIUM CHLORIDE, POTASSIUM CHLORIDE, SODIUM LACTATE AND CALCIUM CHLORIDE 1000 ML: 600; 310; 30; 20 INJECTION, SOLUTION INTRAVENOUS at 02:03

## 2023-01-01 RX ADMIN — PROPOFOL 200 MG: 10 INJECTION, EMULSION INTRAVENOUS at 07:01

## 2023-01-01 RX ADMIN — SODIUM CHLORIDE, POTASSIUM CHLORIDE, SODIUM LACTATE AND CALCIUM CHLORIDE: 600; 310; 30; 20 INJECTION, SOLUTION INTRAVENOUS at 06:01

## 2023-01-01 RX ADMIN — PIPERACILLIN AND TAZOBACTAM 4.5 G: 4; .5 INJECTION, POWDER, LYOPHILIZED, FOR SOLUTION INTRAVENOUS; PARENTERAL at 10:04

## 2023-01-01 RX ADMIN — FENTANYL CITRATE 50 MCG: 50 INJECTION, SOLUTION INTRAMUSCULAR; INTRAVENOUS at 08:01

## 2023-01-01 RX ADMIN — POTASSIUM & SODIUM PHOSPHATES POWDER PACK 280-160-250 MG 2 PACKET: 280-160-250 PACK at 09:04

## 2023-01-01 RX ADMIN — ONDANSETRON 4 MG: 2 INJECTION INTRAMUSCULAR; INTRAVENOUS at 12:04

## 2023-01-01 RX ADMIN — CARBOPLATIN 750 MG: 10 INJECTION INTRAVENOUS at 02:03

## 2023-01-01 RX ADMIN — THIAMINE HCL TAB 100 MG 100 MG: 100 TAB at 09:04

## 2023-01-01 RX ADMIN — ONDANSETRON 8 MG: 2 INJECTION INTRAMUSCULAR; INTRAVENOUS at 09:02

## 2023-01-01 RX ADMIN — GEMCITABINE HYDROCHLORIDE 1760 MG: 2 INJECTION, SOLUTION INTRAVENOUS at 01:03

## 2023-01-01 RX ADMIN — SODIUM CHLORIDE, PRESERVATIVE FREE 10 ML: 5 INJECTION INTRAVENOUS at 01:04

## 2023-01-01 RX ADMIN — LOPERAMIDE HYDROCHLORIDE 2 MG: 2 CAPSULE ORAL at 09:04

## 2023-01-01 RX ADMIN — MORPHINE SULFATE 4 MG: 4 INJECTION INTRAVENOUS at 09:04

## 2023-01-01 RX ADMIN — GABAPENTIN 600 MG: 300 CAPSULE ORAL at 06:01

## 2023-01-01 RX ADMIN — MORPHINE SULFATE 15 MG: 15 TABLET, EXTENDED RELEASE ORAL at 09:03

## 2023-01-01 RX ADMIN — MIDODRINE HYDROCHLORIDE 5 MG: 5 TABLET ORAL at 09:04

## 2023-01-01 RX ADMIN — SCOPOLAMINE 1 PATCH: 1 PATCH TRANSDERMAL at 06:01

## 2023-01-01 RX ADMIN — SODIUM CHLORIDE 1000 ML: 9 INJECTION, SOLUTION INTRAVENOUS at 07:04

## 2023-01-01 RX ADMIN — MIDODRINE HYDROCHLORIDE 5 MG: 5 TABLET ORAL at 08:04

## 2023-01-01 RX ADMIN — OXYCODONE HYDROCHLORIDE 10 MG: 5 TABLET ORAL at 07:03

## 2023-01-01 RX ADMIN — PALONOSETRON 0.25 MG: 0.25 INJECTION, SOLUTION INTRAVENOUS at 12:02

## 2023-01-01 RX ADMIN — FAMOTIDINE 20 MG: 10 INJECTION INTRAVENOUS at 08:04

## 2023-01-01 RX ADMIN — ALBUTEROL SULFATE 2.5 MG: 2.5 SOLUTION RESPIRATORY (INHALATION) at 03:03

## 2023-01-01 RX ADMIN — Medication 6 MG: at 08:03

## 2023-01-01 RX ADMIN — ENOXAPARIN SODIUM 40 MG: 40 INJECTION SUBCUTANEOUS at 05:04

## 2023-01-01 RX ADMIN — DOCUSATE SODIUM 100 MG: 100 CAPSULE, LIQUID FILLED ORAL at 10:03

## 2023-01-01 RX ADMIN — Medication 10 ML: at 10:02

## 2023-01-01 RX ADMIN — SCOLOPAMINE TRANSDERMAL SYSTEM 1 PATCH: 1 PATCH, EXTENDED RELEASE TRANSDERMAL at 06:01

## 2023-01-01 RX ADMIN — SODIUM CHLORIDE, POTASSIUM CHLORIDE, SODIUM LACTATE AND CALCIUM CHLORIDE: 600; 310; 30; 20 INJECTION, SOLUTION INTRAVENOUS at 07:01

## 2023-01-01 RX ADMIN — MORPHINE SULFATE 15 MG: 15 TABLET, FILM COATED, EXTENDED RELEASE ORAL at 11:04

## 2023-01-01 RX ADMIN — ENOXAPARIN SODIUM 40 MG: 40 INJECTION SUBCUTANEOUS at 05:03

## 2023-01-01 RX ADMIN — POLYETHYLENE GLYCOL 3350 17 G: 17 POWDER, FOR SOLUTION ORAL at 09:03

## 2023-01-01 RX ADMIN — ZOLEDRONIC ACID 4 MG: 4 SOLUTION INTRAVENOUS at 10:03

## 2023-01-01 RX ADMIN — POTASSIUM & SODIUM PHOSPHATES POWDER PACK 280-160-250 MG 2 PACKET: 280-160-250 PACK at 03:04

## 2023-01-01 RX ADMIN — DEXAMETHASONE 8 MG: 4 TABLET ORAL at 06:03

## 2023-01-01 RX ADMIN — OXYCODONE HYDROCHLORIDE 10 MG: 5 TABLET ORAL at 03:03

## 2023-01-01 RX ADMIN — GADOBENATE DIMEGLUMINE 20 ML: 529 INJECTION, SOLUTION INTRAVENOUS at 05:03

## 2023-01-01 RX ADMIN — METRONIDAZOLE 500 MG: 5 INJECTION, SOLUTION INTRAVENOUS at 11:04

## 2023-01-01 RX ADMIN — FAMOTIDINE 20 MG: 20 TABLET, FILM COATED ORAL at 09:04

## 2023-01-01 RX ADMIN — Medication 10 ML: at 11:03

## 2023-01-01 RX ADMIN — ONDANSETRON 4 MG: 2 INJECTION INTRAMUSCULAR; INTRAVENOUS at 04:04

## 2023-01-01 RX ADMIN — SODIUM CHLORIDE 1000 ML: 9 INJECTION, SOLUTION INTRAVENOUS at 11:03

## 2023-01-01 RX ADMIN — POLYETHYLENE GLYCOL 3350 17 G: 17 POWDER, FOR SOLUTION ORAL at 08:03

## 2023-01-01 RX ADMIN — ONDANSETRON 4 MG: 2 INJECTION INTRAMUSCULAR; INTRAVENOUS at 03:04

## 2023-01-01 RX ADMIN — ONDANSETRON 4 MG: 2 INJECTION INTRAMUSCULAR; INTRAVENOUS at 09:04

## 2023-01-01 RX ADMIN — LEVETIRACETAM INJECTION 1000 MG: 10 INJECTION INTRAVENOUS at 10:04

## 2023-01-01 RX ADMIN — DOCUSATE SODIUM 100 MG: 100 CAPSULE, LIQUID FILLED ORAL at 09:03

## 2023-01-01 RX ADMIN — PALONOSETRON 0.25 MG: 0.25 INJECTION, SOLUTION INTRAVENOUS at 12:03

## 2023-01-01 RX ADMIN — SODIUM POLYSTYRENE SULFONATE 15 G: 15 SUSPENSION ORAL; RECTAL at 12:03

## 2023-01-01 RX ADMIN — SODIUM CHLORIDE, PRESERVATIVE FREE 10 ML: 5 INJECTION INTRAVENOUS at 08:04

## 2023-01-01 RX ADMIN — OLANZAPINE 5 MG: 5 TABLET, FILM COATED ORAL at 10:03

## 2023-01-01 RX ADMIN — SODIUM CHLORIDE, PRESERVATIVE FREE 10 ML: 5 INJECTION INTRAVENOUS at 12:04

## 2023-01-01 RX ADMIN — HEPARIN 500 UNITS: 100 SYRINGE at 10:02

## 2023-01-01 RX ADMIN — PIPERACILLIN AND TAZOBACTAM 4.5 G: 4; .5 INJECTION, POWDER, FOR SOLUTION INTRAVENOUS; PARENTERAL at 02:04

## 2023-01-01 RX ADMIN — LORAZEPAM 1 MG: 2 INJECTION INTRAMUSCULAR; INTRAVENOUS at 04:04

## 2023-01-01 RX ADMIN — THIAMINE HCL TAB 100 MG 100 MG: 100 TAB at 08:04

## 2023-01-01 RX ADMIN — FUROSEMIDE 80 MG: 10 INJECTION, SOLUTION INTRAMUSCULAR; INTRAVENOUS at 06:03

## 2023-01-01 RX ADMIN — GEMCITABINE HYDROCHLORIDE 2200 MG: 1 INJECTION, SOLUTION INTRAVENOUS at 10:02

## 2023-01-01 RX ADMIN — OXYCODONE HYDROCHLORIDE 10 MG: 5 TABLET ORAL at 05:03

## 2023-01-01 RX ADMIN — CARBOPLATIN 750 MG: 10 INJECTION INTRAVENOUS at 12:02

## 2023-01-01 RX ADMIN — SODIUM CHLORIDE 1000 ML: 9 INJECTION, SOLUTION INTRAVENOUS at 10:03

## 2023-01-01 RX ADMIN — GADOBENATE DIMEGLUMINE 20 ML: 529 INJECTION, SOLUTION INTRAVENOUS at 02:01

## 2023-01-01 RX ADMIN — Medication 1 TABLET: at 09:04

## 2023-01-01 RX ADMIN — PIPERACILLIN AND TAZOBACTAM 4.5 G: 4; .5 INJECTION, POWDER, FOR SOLUTION INTRAVENOUS; PARENTERAL at 12:04

## 2023-01-01 RX ADMIN — FIDAXOMICIN 200 MG: 200 TABLET, FILM COATED ORAL at 11:04

## 2023-01-01 RX ADMIN — MORPHINE SULFATE 15 MG: 15 TABLET, FILM COATED, EXTENDED RELEASE ORAL at 08:04

## 2023-01-01 RX ADMIN — APREPITANT 130 MG: 130 INJECTION, EMULSION INTRAVENOUS at 11:01

## 2023-01-01 RX ADMIN — HEPARIN 500 UNITS: 100 SYRINGE at 12:03

## 2023-01-01 RX ADMIN — DEXAMETHASONE SODIUM PHOSPHATE 8 MG: 4 INJECTION, SOLUTION INTRA-ARTICULAR; INTRALESIONAL; INTRAMUSCULAR; INTRAVENOUS; SOFT TISSUE at 10:03

## 2023-01-01 RX ADMIN — APREPITANT 130 MG: 130 INJECTION, EMULSION INTRAVENOUS at 12:03

## 2023-01-01 RX ADMIN — ONDANSETRON 4 MG: 2 INJECTION INTRAMUSCULAR; INTRAVENOUS at 11:04

## 2023-01-01 RX ADMIN — ONDANSETRON 4 MG: 2 INJECTION INTRAMUSCULAR; INTRAVENOUS at 08:04

## 2023-01-01 RX ADMIN — ENOXAPARIN SODIUM 40 MG: 40 INJECTION SUBCUTANEOUS at 04:04

## 2023-01-01 RX ADMIN — HEPARIN 500 UNITS: 100 SYRINGE at 02:03

## 2023-01-01 RX ADMIN — LORAZEPAM 1 MG: 2 INJECTION INTRAMUSCULAR; INTRAVENOUS at 10:04

## 2023-01-01 RX ADMIN — GEMCITABINE HYDROCHLORIDE 2200 MG: 2 INJECTION, SOLUTION INTRAVENOUS at 12:01

## 2023-01-01 RX ADMIN — INSULIN HUMAN 5 UNITS: 100 INJECTION, SOLUTION PARENTERAL at 01:03

## 2023-01-01 RX ADMIN — FIDAXOMICIN 200 MG: 200 TABLET, FILM COATED ORAL at 08:04

## 2023-01-01 RX ADMIN — ONDANSETRON 8 MG: 2 INJECTION INTRAMUSCULAR; INTRAVENOUS at 10:03

## 2023-01-01 RX ADMIN — HEPARIN 500 UNITS: 100 SYRINGE at 01:01

## 2023-01-01 RX ADMIN — OXYCODONE HYDROCHLORIDE 10 MG: 5 TABLET ORAL at 12:04

## 2023-01-01 RX ADMIN — HEPARIN 500 UNITS: 100 SYRINGE at 11:03

## 2023-01-01 RX ADMIN — MIDAZOLAM HYDROCHLORIDE 2 MG: 1 INJECTION INTRAMUSCULAR; INTRAVENOUS at 07:01

## 2023-01-01 RX ADMIN — MORPHINE SULFATE 4 MG: 4 INJECTION INTRAVENOUS at 12:04

## 2023-01-01 RX ADMIN — LEVETIRACETAM INJECTION 1000 MG: 10 INJECTION INTRAVENOUS at 09:04

## 2023-01-01 RX ADMIN — APREPITANT 130 MG: 130 INJECTION, EMULSION INTRAVENOUS at 12:02

## 2023-01-01 RX ADMIN — SODIUM CHLORIDE: 9 INJECTION, SOLUTION INTRAVENOUS at 09:02

## 2023-01-01 RX ADMIN — APREPITANT 130 MG: 130 INJECTION, EMULSION INTRAVENOUS at 10:03

## 2023-01-01 RX ADMIN — IOPAMIDOL 100 ML: 755 INJECTION, SOLUTION INTRAVENOUS at 04:03

## 2023-01-01 RX ADMIN — PEGFILGRASTIM 6 MG: KIT SUBCUTANEOUS at 10:03

## 2023-01-01 RX ADMIN — Medication 1 TABLET: at 08:04

## 2023-01-01 RX ADMIN — CEFAZOLIN 2 G: 330 INJECTION, POWDER, FOR SOLUTION INTRAMUSCULAR; INTRAVENOUS at 07:01

## 2023-01-01 RX ADMIN — HALOPERIDOL LACTATE 2 MG: 5 INJECTION, SOLUTION INTRAMUSCULAR at 01:04

## 2023-01-01 RX ADMIN — METRONIDAZOLE 500 MG: 5 INJECTION, SOLUTION INTRAVENOUS at 02:04

## 2023-01-01 RX ADMIN — MIDAZOLAM HYDROCHLORIDE 2 MG: 1 INJECTION, SOLUTION INTRAMUSCULAR; INTRAVENOUS at 07:01

## 2023-01-01 RX ADMIN — MIDODRINE HYDROCHLORIDE 5 MG: 5 TABLET ORAL at 01:04

## 2023-01-01 RX ADMIN — LIDOCAINE HYDROCHLORIDE 50 MG: 10 INJECTION, SOLUTION EPIDURAL; INFILTRATION; INTRACAUDAL; PERINEURAL at 07:01

## 2023-01-04 NOTE — ED PROVIDER NOTES
Encounter Date: 1/4/2023       History     Chief Complaint   Patient presents with    Lymphadenopathy     Pt reports recent dx of right breast mass, had biopsy done x5 days ago, c/o R neck, R arm, R axillary swelling     57-year-old female presents to ED for right supraclavicular adenopathy and right upper extremity swelling.  Patient states she had a biopsy performed of the right axillary region and right breast secondary several days ago.  states since she has had mild right upper extremity swelling.  No distal paresthesias, weakness or dysfunctions.  Denies any left-sided symptoms.  No chest pain or pressure.  Denies any fever chills cough congestion. does endorse a new area of swelling in her right supraclavicular region that is nontender, not red and has no difficulty breathing.  Denies any trauma. No joint pains, no skin changes.  States the biopsy sites are nonpainful and well healing.  No other complaints or concerns at this time.     Review of patient's allergies indicates:  No Known Allergies  Past Medical History:   Diagnosis Date    Hyperlipidemia     Knee pain     Menopausal flushing     Normocytic normochromic anemia     Vitamin D deficiency      Past Surgical History:   Procedure Laterality Date    TUBAL LIGATION       Family History   Problem Relation Age of Onset    Hypertension Mother      Social History     Tobacco Use    Smoking status: Never    Smokeless tobacco: Never   Substance Use Topics    Alcohol use: Yes     Comment: occassionally    Drug use: Never     Review of Systems   Constitutional:  Negative for chills, diaphoresis and fever.        Right supraclavicular lymphadenopathy   HENT:  Negative for congestion, rhinorrhea, sinus pain and sore throat.    Eyes:  Negative for pain, discharge and itching.   Respiratory:  Negative for cough, chest tightness and shortness of breath.    Cardiovascular:  Negative for chest pain and palpitations.   Gastrointestinal:  Negative for  abdominal pain, nausea and vomiting.   Genitourinary:  Negative for dysuria, flank pain and hematuria.   Musculoskeletal:  Negative for back pain and myalgias.        Right upper extremity swelling   Skin:  Negative for color change and rash.   Neurological:  Negative for dizziness, weakness and headaches.   Psychiatric/Behavioral:  Negative for confusion. The patient is not hyperactive.      Physical Exam     Initial Vitals [01/04/23 1031]   BP Pulse Resp Temp SpO2   (!) 144/81 92 20 98.1 °F (36.7 °C) 99 %      MAP       --         Physical Exam    Vitals reviewed.  Constitutional: She appears well-developed and well-nourished. She is not diaphoretic. No distress.   HENT:   Head: Normocephalic and atraumatic.   Eyes: Conjunctivae and EOM are normal. Pupils are equal, round, and reactive to light.   Neck: Neck supple. No tracheal deviation present.   Normal range of motion.  Cardiovascular:  Normal rate, regular rhythm, normal heart sounds and intact distal pulses.           Pulmonary/Chest: Breath sounds normal. No respiratory distress.   Right supraclavicular lymphadenopathy.  Nonfluctuant.  Skin normal, not red, no evidence of trauma, nonpainful.   Abdominal: Abdomen is soft. There is no abdominal tenderness. There is no rebound and no guarding.   Musculoskeletal:         General: Normal range of motion.      Cervical back: Normal range of motion and neck supple.      Comments: Very mild right upper extremity swelling.  2+ radial distally.  Sensation intact distally with excellent distal strength.  Full range of motion in all upper extremity joints.     Neurological: She is alert and oriented to person, place, and time. She has normal strength. GCS score is 15. GCS eye subscore is 4. GCS verbal subscore is 5. GCS motor subscore is 6.   Skin: Skin is warm and dry. Capillary refill takes less than 2 seconds. No rash noted.   Psychiatric: She has a normal mood and affect. Her behavior is normal. Judgment and thought  content normal.       ED Course   Procedures  Labs Reviewed   COMPREHENSIVE METABOLIC PANEL - Abnormal; Notable for the following components:       Result Value    Glucose Level 102 (*)     Globulin 3.7 (*)     Albumin/Globulin Ratio 1.0 (*)     All other components within normal limits   CBC WITH DIFFERENTIAL - Abnormal; Notable for the following components:    RBC 3.97 (*)     Hgb 11.4 (*)     Hct 35.7 (*)     MCHC 31.9 (*)     All other components within normal limits   COVID/RSV/FLU A&B PCR - Normal    Narrative:     The Xpert Xpress SARS-CoV-2/FLU/RSV plus is a rapid, multiplexed real-time PCR test intended for the simultaneous qualitative detection and differentiation of SARS-CoV-2, Influenza A, Influenza B, and respiratory syncytial virus (RSV) viral RNA in either nasopharyngeal swab or nasal swab specimens.         CBC W/ AUTO DIFFERENTIAL    Narrative:     The following orders were created for panel order CBC auto differential.  Procedure                               Abnormality         Status                     ---------                               -----------         ------                     CBC with Differential[[id number]]        Abnormal            Final result                 Please view results for these tests on the individual orders.          Imaging Results    None          Medications - No data to display  Medical Decision Making:   Clinical Tests:   Lab Tests: Reviewed and Ordered  Radiological Study: Reviewed and Ordered  ED Management:  57-year-old very pleasant female presents to ED for new area of localized swelling above her right clavicle and mild right arm swelling after a biopsy to her breast and axilla.  On exam has excellent distal neurovascular findings in the right upper extremity.  No evidence of occlusion. very mild generalized swelling appreciated. no history of DVTs and no other extremities affected.  Biopsy sites well-healing and nontender.  Ultrasound of the extremity  negative for acute thrombus. supraclavicular area appears to be a lymph node. no skin changes, no evidence of infection and non tender. laboratory analysis grossly unremarkable including viral panel. prescribed NSAIDs for the lymphadenopathy and pt is to elevate the affected extremity for increased venous return.  Requires close follow up in the outpatient setting and provided strict return precautions.  Voiced understanding. Released. (Clem)   Conclusion: No evidence of deep or superficial thrombosis in the right upper extremity.                        Clinical Impression:   Final diagnoses:  [R59.1] Lymphadenopathy (Primary)  [R60.0] Edema of right upper extremity        ED Disposition Condition    Discharge Stable          ED Prescriptions       Medication Sig Dispense Start Date End Date Auth. Provider    ketorolac (TORADOL) 10 mg tablet Take 1 tablet (10 mg total) by mouth every 6 (six) hours as needed for Pain. 15 tablet 1/4/2023 1/9/2023 Sam Nj MD          Follow-up Information       Follow up With Specialties Details Why Contact Info    ASHLEY Vogel Nurse Practitioner Schedule an appointment as soon as possible for a visit in 3 days  [address] [facility] [address]  [phone]      [facility] Emergency Medicine  As needed, If symptoms worsen [address] [facility] [address] [phone]             Sam Nj MD  01/05/23 2057

## 2023-01-05 PROBLEM — R59.0 LYMPHADENOPATHY, SUPRACLAVICULAR: Status: ACTIVE | Noted: 2023-01-01

## 2023-01-05 PROBLEM — L08.9 SKIN INFECTION: Status: ACTIVE | Noted: 2023-01-01

## 2023-01-05 NOTE — PROGRESS NOTES
"  ASHLEY Vogel   [facility]  [address] [place] [address]      PATIENT NAME: Esme Marie  : 1965  DATE: 23  MRN: [id number]      Patient PCP Information       Provider PCP Type    ASHLEY Vogel General            Reason for Visit / Chief Complaint: Follow-up (Lump on side of right breast under arm )       History of Present Illness / Problem Focused Workflow     Esme Marie presents to the clinic with Follow-up (Lump on side of right breast under arm )     56 yo AAF here today for f/u. PMHx includes prediabetes, HLD, anemia, OA bilateral knees, lumbar spondylosis and DJD, Vitamin D deficiency.  Non-smoker. Followed by [facility] for left knee pain.     Cervical Cancer Screening: [facility]  Breast Cancer Screenin22 [facility]  Osteoporosis Screening:  HCV Screenin/20/22  Pt here today with c/o right breast discomfort & swelling & a "knot" under her right axillary; also a small palpable "nodule" to the right breast around 9:00-11:00.  Reports it started on 22. Pt has a Screening [facility] scheduled for 22. PE noted palpable mass to the right axillary that was tender to palpation, comparison of right breast vs left breast noted the Right breast is swollen, tender, warm, some erythema noted as well, no nipple discharge. Left breast is normal. VSS. Spoke with Yasmine in Breast Imaging, will order [facility] Diagnostic and right breast US. Pt agreeable to cellulitis treatment today to include oral antibiotics and Rocephin IM today in clinic, then x x 2 more days in clinic for Rocephin IM. C precautions discussed as well.     23  Pt here today for ED f/u; pt completed [facility] / US since previous OV, biopsy results pending; aerobic culture perfomed by Dr. Sanchez positive for Staphylococcus capitis, sensitivity reviewed, will start patient on Bactrim DS BID x 10 days, pt reports she finished other AB yesterday. The " pt was seen at the ED for a lump to the supraclavicular area and right arm edma, pt has compression sleeve on today. Lump area noted, reports it just came out sine our LOV, ED r/o blood clot. Pt is agreeable to soft tissue US today, called Radiology, they will get patient in today and I will f/u with results. Will await pathology reports from Breast biopsy as well. Reports overall she feels okay though. Denies any other issues at this time.   Denies chest pain, shortness of breath, cough, fever, headache, dizziness, weakness, abdominal pain, nausea, vomiting, diarrhea, constipation, black/bloody stools, unplanned weight loss, night sweats, changes in urinary patterns, burning/odor with urination, depression, anxiety, and SI/HI.       Follow-up      Review of Systems     Review of Systems   Constitutional: Negative.    HENT: Negative.     Eyes: Negative.    Respiratory: Negative.     Cardiovascular: Negative.    Gastrointestinal: Negative.    Endocrine: Negative.    Genitourinary: Negative.    Neurological: Negative.    Psychiatric/Behavioral: Negative.           Medications and Allergies     Medications  Medication List with Changes/Refills   New Medications    SULFAMETHOXAZOLE-TRIMETHOPRIM 800-160MG (BACTRIM DS) 800-160 MG TAB    Take 1 tablet by mouth 2 (two) times daily. With Food. for 10 days   Current Medications    KETOROLAC (TORADOL) 10 MG TABLET    Take 1 tablet (10 mg total) by mouth every 6 (six) hours as needed for Pain.    OMEGA-3 FATTY ACIDS/FISH OIL (FISH OIL-OMEGA-3 FATTY ACIDS) 300-1,000 MG CAPSULE    Take by mouth once daily.    VITAMIN D (VITAMIN D3) 1000 UNITS TAB    Take 5,000 Units by mouth once daily.        Allergies  Review of patient's allergies indicates:  No Known Allergies    Physical Examination   There were no vitals filed for this visit.  Physical Exam  Vitals reviewed.   Constitutional:       Appearance: Normal appearance. She is normal weight.   HENT:      Head: Normocephalic.    Neck:     Cardiovascular:      Rate and Rhythm: Normal rate and regular rhythm.      Pulses: Normal pulses.      Heart sounds: Normal heart sounds.   Pulmonary:      Effort: Pulmonary effort is normal.      Breath sounds: Normal breath sounds.   Abdominal:      General: Abdomen is flat.      Palpations: Abdomen is soft.   Musculoskeletal:         General: Normal range of motion.      Cervical back: Normal range of motion.   Lymphadenopathy:      Upper Body:      Right upper body: Supraclavicular adenopathy (lymphadenopathy noted with lump) present.   Skin:     General: Skin is warm and dry.   Neurological:      Mental Status: She is alert.   Psychiatric:         Mood and Affect: Mood normal.         Results     Lab Results   Component Value Date    WBC 5.9 01/04/2023    RBC 3.97 (L) 01/04/2023    HGB 11.4 (L) 01/04/2023    HCT 35.7 (L) 01/04/2023    MCV 89.9 01/04/2023    MCH 28.7 01/04/2023    MCHC 31.9 (L) 01/04/2023    RDW 13.2 01/04/2023     01/04/2023    MPV 11.0 01/04/2023     CMP  Sodium Level   Date Value Ref Range Status   01/04/2023 140 136 - 145 mmol/L Final     Potassium Level   Date Value Ref Range Status   01/04/2023 4.1 3.5 - 5.1 mmol/L Final     Carbon Dioxide   Date Value Ref Range Status   01/04/2023 28 22 - 29 mmol/L Final     Blood Urea Nitrogen   Date Value Ref Range Status   01/04/2023 15.7 9.8 - 20.1 mg/dL Final     Creatinine   Date Value Ref Range Status   01/04/2023 0.82 0.55 - 1.02 mg/dL Final     Calcium Level Total   Date Value Ref Range Status   01/04/2023 9.6 8.4 - 10.2 mg/dL Final     Albumin Level   Date Value Ref Range Status   01/04/2023 3.8 3.5 - 5.0 g/dL Final     Bilirubin Total   Date Value Ref Range Status   01/04/2023 0.3 <=1.5 mg/dL Final     Alkaline Phosphatase   Date Value Ref Range Status   01/04/2023 92 40 - 150 unit/L Final     Aspartate Aminotransferase   Date Value Ref Range Status   01/04/2023 27 5 - 34 unit/L Final     Alanine Aminotransferase   Date Value  Ref Range Status   01/04/2023 24 0 - 55 unit/L Final     Estimated GFR-Non    Date Value Ref Range Status   05/14/2021 79 (L) >>=90 mL/min/1.73 m2 Final     Lab Results   Component Value Date    CHOL 240 (H) 05/13/2022     Lab Results   Component Value Date    HDL 80 (H) 05/13/2022     No results found for: LDLCALC  Lab Results   Component Value Date    TRIG 69 05/13/2022     No results found for: CHOLHDL  Lab Results   Component Value Date    TSH 1.4115 05/13/2022             Assessment and Plan (including Health Maintenance)     Plan:       Problem List Items Addressed This Visit          ID    Skin infection    Current Assessment & Plan     RX Bactrim DS BID x 10 day  F/U with US results          Relevant Medications    sulfamethoxazole-trimethoprim 800-160mg (BACTRIM DS) 800-160 mg Tab       Other    Lymphadenopathy, supraclavicular - Primary    Current Assessment & Plan     US Soft Tissue Today          Relevant Medications    sulfamethoxazole-trimethoprim 800-160mg (BACTRIM DS) 800-160 mg Tab         Future Appointments   Date Time Provider Department Center   1/5/2023 10:15 AM ASHLEY Vogel [facility]   1/5/2023  1:00 PM NIMISHA [facility]   5/24/2023  7:15 AM ASHLEY Vogel [facility]   12/7/2023  8:10 AM ASHLEY Gallego [facility]              Signature:     [facility]  [address] [facility] [address]    Date of encounter: 1/5/23

## 2023-01-09 NOTE — PROGRESS NOTES
[facility] - Breast [facility]  Breast Surgical Oncology  New Patient Office Visit - H&P      Referring Provider: Amarilys Saha   PCP: ASHLEY Vogel     Chief Complaint:   Chief Complaint   Patient presents with    Breast Cancer     Patient present today in clinic for new breast ca dx in right breast.        Subjective:     HPI:  Esme Marie is a 57 y.o. female who presents on 1/10/2023 for evaluation of newly diagnosed right  breast cancer.    A detailed patient history was obtained and reviewed. She last initially noted right breast swelling for about 1 month. She noticed it was significantly bigger the her left. She felt a small nodule on the far lateral aspect. There was no pain and no discharge from the nipple. The nipple is a little retracted.       MG breast density: Category C (Heterogeneously dense)     Imagin. 2022 BL DG MG/ R US BREAST LIMITED at [facility]- which revealed in R breast, a developing asymmetry extending from the retroareolar area to involve all quadrants of the right breast, anterior to posterior depth, measuring approximately 12 cm.  There is a 3.3 cm oval, obscured, equal density mass-like component in the lower central right breast, middle to posterior depth.  Triangle marker is noted on the upper outer right breast demarcating the site of clinical concern with underlying 1.7 cm focal asymmetry in the upper outer right breast, posterior depth.  There is diffuse right breast skin thickening most prominent in the periareolar breast.  There is right nipple retraction.  There is a 3.8 cm oval, high density mass/abnormal lymph node with indistinct margin in the right axilla.   On R US, there is a 1.5 x 0.8 x 1.7 cm irregular, non parallel, heterogeneous dermal-subdermal mass with indistinct margin, minimal internal vascularity, and some posterior shadowing in the upper outer right breast at 10 o'clock 12 cm FN,  the site of patient indicated palpable lump,  correlating with the mammographic focal asymmetry.  There is associated skin thickening of approximately 0.4 cm.  There is adjacent heterogeneous, echogenic signal within the subdermal fat measuring approximately 2.4 x 1.2 x 2.1 cm.   There is a 2.2 x 1.7 x 3.3 cm irregular, non parallel, hypoechoic mass with angular and indistinct margin, peripheral vascularity, and some posterior enhancement in the lower central right breast at 6 o'clock 9 cm FN, correlating with the mass-like mammographic finding.  There is skin thickening measuring approximately 0.8 cm and edema in the retroareolar right breast with loss of the normal soft tissue planes.  There is associated hyperemia.   There is a 5.3 x 3.9 x 5.5 cm irregular, non parallel, hypoechoic mass with angular and indistinct margins, peripheral vascularity, and posterior shadowing in the upper outer right breast at 10 o'clock 8 cm FN..     There is a 3.6 x 2.1 x 3.8 cm oval, hypoechoic mass/abnormal lymph node with indistinct margin, minimal internal vascularity, and posterior enhancement in the right axilla at 10 o'clock 14 cm FN. BIRADS-4 suspicious; biopsy recommended.       2. 1/5/2023 US SOFT TISSUE HEAD NECK AND THYROID at [facility]- which revealed at area of palpable complaint corresponds with a heterogeneous mixed echogenicity 1.1 x 1 x 0.6 cm lesion with internal color Doppler signal.  Deeper to this there is a 2nd, larger 3.5 x 2.7 x 2.1 cm mixed cystic and solid lesion in the right supraclavicular space.  Cystic components may be related to necrosis or suppurative change.       Pathology:  1. 12/30/2022 Ultrasound-guided Core Needle Biopsy Right Breast 6 o'clock 9 cm FN - Invasive carcinoma of no special type (ductal), grade 3   ER less than 1%  NH 0%  HER 2 Negative  Ki67 65%    2. 12/30/2022 Ultrasound-guided Core Needle Biopsy Right Axillary Mass/ Abnormal Lymph Node 10 o'clock 14 cm FN - Invasive carcinoma of no special type (ductal); no lymph node  architecture identified    OB/GYN History:  Age at Menarche Onset: 14  Menopausal Status: postmenopausal, LMP: 2019  Hysterectomy/Oophorectomy: menopause, at age 54  Hormonal birth control (duration): Yes starting at age 17 used for 2 years.   Pregnancy History:   Age at first live birth: 16  Hormone Replacement Therapy: No, none  Patient did not breast feed.  Patient denies nipple discharge.   Patient denies to previous breast biopsy.   Patient denies a personal history of breast cancer.    Other Relevant History:  Prior thoracic RT: none  Genetic testing: None  Ashkenazi Restorationism descent: No    Family History:  Family History   Problem Relation Age of Onset    Hypertension Mother     Diabetes Mother     Cancer Father 62        Past History:  Past Medical History:   Diagnosis Date    Hyperlipidemia     Knee pain     Menopausal flushing     Menopause 3/2018    Normocytic normochromic anemia     Vitamin D deficiency         Past Surgical History:   Procedure Laterality Date    TUBAL LIGATION          Social History     Socioeconomic History    Marital status: Single   Occupational History    Occupation: [employer]   Tobacco Use    Smoking status: Never    Smokeless tobacco: Never   Substance and Sexual Activity    Alcohol use: Yes     Comment: occassionally    Drug use: Never    Sexual activity: Yes     Partners: Female, Male     Social Determinants of Health     Financial Resource Strain: Low Risk     Difficulty of Paying Living Expenses: Not hard at all   Food Insecurity: No Food Insecurity    Worried About Running Out of Food in the Last Year: Never true    Ran Out of Food in the Last Year: Never true   Transportation Needs: No Transportation Needs    Lack of Transportation (Medical): No    Lack of Transportation (Non-Medical): No   Physical Activity: Inactive    Days of Exercise per Week: 0 days    Minutes of Exercise per Session: 0 min   Stress: No Stress Concern Present    Feeling of Stress :  Only a little   Social Connections: Moderately Isolated    Frequency of Communication with Friends and Family: More than three times a week    Frequency of Social Gatherings with Friends and Family: More than three times a week    Attends Voodoo Services: 1 to 4 times per year    Active Member of Clubs or Organizations: No    Attends Club or Organization Meetings: Never    Marital Status: Never    Housing Stability: Unknown    Unable to Pay for Housing in the Last Year: No    Unstable Housing in the Last Year: No        Body mass index is 39.1 kg/m².     Allergy/Medications:   Review of patient's allergies indicates:  No Known Allergies       Current Outpatient Medications:     omega-3 fatty acids/fish oil (FISH OIL-OMEGA-3 FATTY ACIDS) 300-1,000 mg capsule, Take by mouth once daily., Disp: , Rfl:     sulfamethoxazole-trimethoprim 800-160mg (BACTRIM DS) 800-160 mg Tab, Take 1 tablet by mouth 2 (two) times daily. With Food. for 10 days, Disp: 20 tablet, Rfl: 0    vitamin D (VITAMIN D3) 1000 units Tab, Take 5,000 Units by mouth once daily., Disp: , Rfl:        Review of Systems:  Review of Systems   Constitutional:  Negative for chills, fever and weight loss.   HENT:  Negative for sore throat.    Eyes:  Negative for blurred vision and double vision.   Respiratory:  Negative for cough and shortness of breath.    Cardiovascular:  Negative for chest pain, palpitations and leg swelling.   Gastrointestinal:  Negative for abdominal pain, constipation, diarrhea, heartburn, nausea and vomiting.   Genitourinary:  Negative for dysuria, flank pain, frequency, hematuria and urgency.   Musculoskeletal:  Negative for back pain, joint pain and myalgias.        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   Endo/Heme/Allergies:  Does not bruise/bleed easily.   Psychiatric/Behavioral:  Negative for depression. The  "patient is not nervous/anxious.           Objective:     Vitals:  Blood pressure 136/75, pulse 73, temperature 98 °F (36.7 °C), temperature source Oral, resp. rate 18, height 5' 4" (1.626 m), weight 103.3 kg (227 lb 12.8 oz), SpO2 100 %.      Physical Exam:  General: The patient is awake, alert and oriented times three. The patient is well nourished and in no acute distress.   Neck: There is no evidence of palpable left cervical, supraclavicular or axillary adenopathy. She has palpable right supraclavicular and right axillary lymphadenopathy on examination. The neck is supple. The thyroid is not enlarged.   Musculoskeletal: The patient has a normal range of motion of her bilateral upper extremities.   Chest: Examination of the chest wall fails to reveal any obvious abnormalities. The lungs are clear to auscultation bilaterally without rales, rhonchi, or wheezing.   Cardiovascular: The heart has a regular rate and rhythm without murmurs, gallops or rubs.  Breast:  Right: Examination of right breast reveals an enlarged breast with erythematous change to almost the entire breast which was acute onset, a palpable nodule at the axillary line which could be a satellite nodule located at my fingertip in the second photo. There is nipple retractions present.            Left: Examination of the left breast fails to reveal any dominant masses or areas of significant focal nodularity. The nipple is everted without evidence of discharge. There is no skin dimpling with movement of the pectoralis. There are no significant skin changes overlying the breast.  Abdomen: The abdomen is soft, flat, nontender and nondistended with no palpable masses or organomegaly.  Integumentary: no rashes or skin lesions present  Neurologic: cranial nerves intact, no signs of peripheral neurological deficit, motor/sensory function intact    Assessment and Discussion:      Cancer Staging   Breast cancer metastasized to axillary lymph node, " right  Staging form: Breast, AJCC 8th Edition  - Clinical stage from 1/10/2023: Stage IIIC (cT4d, cN3c(f), cM0, G3, ER-, HI-, HER2-) - Signed by Silva Shields MD on 1/11/2023        Encounter Diagnoses   Name Primary?    Breast cancer metastasized to axillary lymph node, right       We discussed her imaging and pathology results in detail. She has history consistent with inflammatory breast cancer.    We discussed the need to proceed with local and systemic treatment. Local treatment options are surgery and radiation. The choices for surgical operations include mastectomy and lumpectomy with radiation. The general operative procedure of mastectomy, the skin sparing nature and attempts made to save underlying muscle with modified mastectomy were discussed. The options of primary reconstruction following mastectomy include either implant reconstruction or tissue transfer type of reconstruction. The pros and cons of both of these reconstructive methods were addressed. Both of these are performed in stages and second operation is usually required before the final completion of the reconstructive process. I also explained to the patient that the reconstructive options are generally by law required to be covered by insurance and would be considered part of a cancer operation and not a cosmetic operation. Sometimes also a reduction or mastopexy is performed on the opposite side for better match, and this operation by itself is also considered part of the cancer treatment.     Following explanation of the mastectomy option, I then explained to her the procedure of lumpectomy. The rationale for lumpectomy, the need to obtain clear margins was specifically addressed. The absolute necessity of adding radiation following lumpectomy with good margins was explained. The logistics of radiation were discussed at length. The patient will further discuss details of radiation with her Radiation Oncologist.     I informed her of  the complications which include surgical site infections, hematoma or seroma requiring operation, necrosis of nipple-areola and/or mastectomy flaps requiring debridement or hyperbaric therapy, unplanned re-operations, and delay in adjuvant treatments.     Following this, I also explained to her the procedure of sentinel lymph node mapping and its rationale. We have a 98% success rate identifying the sentinel node. The need to use radioactive dye and blue dye to ensure proper identification of the node was explained. MagTrace brown dye can also be used in the setting of a mastectomy. Also the small but real risk of anaphylactic shock with blue dye or MagTrace dye was discussed. The need to perform completion dissection should the sentinel lymph node be positive was also explained to the patient.     Specifically, we went over the local recurrence rate and survival rates with mastectomy and breast conserving therapy, the small but real risk of lymphedema should we need completion dissection, and the indications for post mastectomy radiation in certain subset of patients.    Following this, I have also briefly discussed with her the rationale for adjuvant systemic treatment in the form of either chemotherapy and/or hormonal therapy. This would depend on the presence of hormone receptors in the tumor. Further recommendations regarding the kind of chemo and the cycles would be finalized by Medical Oncology. She will discuss these issues at length with her medical oncologist.        Given her disease process chemotherapy will be highly recommended. She requires chemotherapy and has venous insufficiency.     I then spent about twenty minutes with the patient explaining the procedure of the port placement.  I explained to her that this would be an outpatient procedure with local anesthesia and sedation.  The port would be placed under her skin with a catheter which runs into the major vein in her neck or chest.  I explained  to her my initial access site would be the subclavian vein which runs beneath the clavicle. If not successful, then the second option would be to do another incision in the neck to access the internal jugular vein.  The risks and complications of pain, bleeding, infection, blood clot, scarring, the inability to access the port, clogging of the port and pneumothorax/hemothorax requiring chest tube or additional surgery were all explained in detail. Informed consent was obtained, and we shall arrange the surgery at the earliest available opportunity.     I have spoken with Dr. Lejeune and Dr. Daugherty.     Plan:     1. Recommend Breast MRI ASAP - re: evaluation extent of disease - 1/11/23  2. PET scan already ordered and scheduled - 1/12/23  3. Recommend Genetic Testing - newly diagnosed triple negative right breast cancer  4. Surgery - Mediport placement  Tentative Date: 1/23/2023, trying to push this up if needed  5. Preop - CBC, CMP, EKG  6. Surgical instructions and information were discussed in detail        All of questions were answered    Silva Shields MD  Breast Surgical Oncology     OFFICE VISIT CODING:    Non-face-to-face time included:  Yes Preparing to see the patient such as reviewing the patient record  Yes Obtaining and reviewing separately obtained history  Yes Independently interpreting results  Yes Documenting clinical information in electronic health record  No Ordering appropriate medications  Yes Ordering appropriate tests  Yes Ordering appropriate procedures (including follow-up)  Yes Referring and communicating with other health care professionals (not separately reported)  Yes Care Coordination (not separately reported)    Face-to-face time included:  Yes Performing a medically necessary appropriate history, examination, and/or evaluation  Yes Communicating results to the patient/family/caregiver  Yes Counseling and educating the patient/family/caregiver  Yes Answering patient/family/caregiver  questions    Total Time: 73 minutes    Total time includes both face-to-face and non-face-to-face time personally spent by myself on the day of the visit.

## 2023-01-09 NOTE — H&P (VIEW-ONLY)
[facility] - [facility]  Breast Surgical Oncology  New Patient Office Visit - H&P      Referring Provider: Amarilys Saha   PCP: ASHLEY Vogel     Chief Complaint:   Chief Complaint   Patient presents with    Breast Cancer     Patient present today in clinic for new breast ca dx in right breast.        Subjective:     HPI:  Esme Marie is a 57 y.o. female who presents on 1/10/2023 for evaluation of newly diagnosed right  breast cancer.    A detailed patient history was obtained and reviewed. She last initially noted right breast swelling for about 1 month. She noticed it was significantly bigger the her left. She felt a small nodule on the far lateral aspect. There was no pain and no discharge from the nipple. The nipple is a little retracted.       MG breast density: Category C (Heterogeneously dense)     Imagin. 2022 BL DG MG/ R US BREAST LIMITED at [facility]- which revealed in R breast, a developing asymmetry extending from the retroareolar area to involve all quadrants of the right breast, anterior to posterior depth, measuring approximately 12 cm.  There is a 3.3 cm oval, obscured, equal density mass-like component in the lower central right breast, middle to posterior depth.  Triangle marker is noted on the upper outer right breast demarcating the site of clinical concern with underlying 1.7 cm focal asymmetry in the upper outer right breast, posterior depth.  There is diffuse right breast skin thickening most prominent in the periareolar breast.  There is right nipple retraction.  There is a 3.8 cm oval, high density mass/abnormal lymph node with indistinct margin in the right axilla.   On R US, there is a 1.5 x 0.8 x 1.7 cm irregular, non parallel, heterogeneous dermal-subdermal mass with indistinct margin, minimal internal vascularity, and some posterior shadowing in the upper outer right breast at 10 o'clock 12 cm FN,  the site of patient indicated palpable lump,  correlating with the mammographic focal asymmetry.  There is associated skin thickening of approximately 0.4 cm.  There is adjacent heterogeneous, echogenic signal within the subdermal fat measuring approximately 2.4 x 1.2 x 2.1 cm.   There is a 2.2 x 1.7 x 3.3 cm irregular, non parallel, hypoechoic mass with angular and indistinct margin, peripheral vascularity, and some posterior enhancement in the lower central right breast at 6 o'clock 9 cm FN, correlating with the mass-like mammographic finding.  There is skin thickening measuring approximately 0.8 cm and edema in the retroareolar right breast with loss of the normal soft tissue planes.  There is associated hyperemia.   There is a 5.3 x 3.9 x 5.5 cm irregular, non parallel, hypoechoic mass with angular and indistinct margins, peripheral vascularity, and posterior shadowing in the upper outer right breast at 10 o'clock 8 cm FN..     There is a 3.6 x 2.1 x 3.8 cm oval, hypoechoic mass/abnormal lymph node with indistinct margin, minimal internal vascularity, and posterior enhancement in the right axilla at 10 o'clock 14 cm FN. BIRADS-4 suspicious; biopsy recommended.       2. 1/5/2023 US SOFT TISSUE HEAD NECK AND THYROID at [facility]- which revealed at area of palpable complaint corresponds with a heterogeneous mixed echogenicity 1.1 x 1 x 0.6 cm lesion with internal color Doppler signal.  Deeper to this there is a 2nd, larger 3.5 x 2.7 x 2.1 cm mixed cystic and solid lesion in the right supraclavicular space.  Cystic components may be related to necrosis or suppurative change.       Pathology:  1. 12/30/2022 Ultrasound-guided Core Needle Biopsy Right Breast 6 o'clock 9 cm FN - Invasive carcinoma of no special type (ductal), grade 3   ER less than 1%  SC 0%  HER 2 Negative  Ki67 65%    2. 12/30/2022 Ultrasound-guided Core Needle Biopsy Right Axillary Mass/ Abnormal Lymph Node 10 o'clock 14 cm FN - Invasive carcinoma of no special type (ductal); no lymph node  architecture identified    OB/GYN History:  Age at Menarche Onset: 14  Menopausal Status: postmenopausal, LMP: 2019  Hysterectomy/Oophorectomy: menopause, at age 54  Hormonal birth control (duration): Yes starting at age 17 used for 2 years.   Pregnancy History:   Age at first live birth: 16  Hormone Replacement Therapy: No, none  Patient did not breast feed.  Patient denies nipple discharge.   Patient denies to previous breast biopsy.   Patient denies a personal history of breast cancer.    Other Relevant History:  Prior thoracic RT: none  Genetic testing: None  Ashkenazi Congregational descent: No    Family History:  Family History   Problem Relation Age of Onset    Hypertension Mother     Diabetes Mother     Cancer Father 62        Past History:  Past Medical History:   Diagnosis Date    Hyperlipidemia     Knee pain     Menopausal flushing     Menopause 3/2018    Normocytic normochromic anemia     Vitamin D deficiency         Past Surgical History:   Procedure Laterality Date    TUBAL LIGATION          Social History     Socioeconomic History    Marital status: Single   Occupational History    Occupation: [employer]   Tobacco Use    Smoking status: Never    Smokeless tobacco: Never   Substance and Sexual Activity    Alcohol use: Yes     Comment: occassionally    Drug use: Never    Sexual activity: Yes     Partners: Female, Male     Social Determinants of Health     Financial Resource Strain: Low Risk     Difficulty of Paying Living Expenses: Not hard at all   Food Insecurity: No Food Insecurity    Worried About Running Out of Food in the Last Year: Never true    Ran Out of Food in the Last Year: Never true   Transportation Needs: No Transportation Needs    Lack of Transportation (Medical): No    Lack of Transportation (Non-Medical): No   Physical Activity: Inactive    Days of Exercise per Week: 0 days    Minutes of Exercise per Session: 0 min   Stress: No Stress Concern Present    Feeling of Stress :  Only a little   Social Connections: Moderately Isolated    Frequency of Communication with Friends and Family: More than three times a week    Frequency of Social Gatherings with Friends and Family: More than three times a week    Attends Yazdanism Services: 1 to 4 times per year    Active Member of Clubs or Organizations: No    Attends Club or Organization Meetings: Never    Marital Status: Never    Housing Stability: Unknown    Unable to Pay for Housing in the Last Year: No    Unstable Housing in the Last Year: No        Body mass index is 39.1 kg/m².     Allergy/Medications:   Review of patient's allergies indicates:  No Known Allergies       Current Outpatient Medications:     omega-3 fatty acids/fish oil (FISH OIL-OMEGA-3 FATTY ACIDS) 300-1,000 mg capsule, Take by mouth once daily., Disp: , Rfl:     sulfamethoxazole-trimethoprim 800-160mg (BACTRIM DS) 800-160 mg Tab, Take 1 tablet by mouth 2 (two) times daily. With Food. for 10 days, Disp: 20 tablet, Rfl: 0    vitamin D (VITAMIN D3) 1000 units Tab, Take 5,000 Units by mouth once daily., Disp: , Rfl:        Review of Systems:  Review of Systems   Constitutional:  Negative for chills, fever and weight loss.   HENT:  Negative for sore throat.    Eyes:  Negative for blurred vision and double vision.   Respiratory:  Negative for cough and shortness of breath.    Cardiovascular:  Negative for chest pain, palpitations and leg swelling.   Gastrointestinal:  Negative for abdominal pain, constipation, diarrhea, heartburn, nausea and vomiting.   Genitourinary:  Negative for dysuria, flank pain, frequency, hematuria and urgency.   Musculoskeletal:  Negative for back pain, joint pain and myalgias.        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   Endo/Heme/Allergies:  Does not bruise/bleed easily.   Psychiatric/Behavioral:  Negative for depression. The  "patient is not nervous/anxious.           Objective:     Vitals:  Blood pressure 136/75, pulse 73, temperature 98 °F (36.7 °C), temperature source Oral, resp. rate 18, height 5' 4" (1.626 m), weight 103.3 kg (227 lb 12.8 oz), SpO2 100 %.      Physical Exam:  General: The patient is awake, alert and oriented times three. The patient is well nourished and in no acute distress.   Neck: There is no evidence of palpable left cervical, supraclavicular or axillary adenopathy. She has palpable right supraclavicular and right axillary lymphadenopathy on examination. The neck is supple. The thyroid is not enlarged.   Musculoskeletal: The patient has a normal range of motion of her bilateral upper extremities.   Chest: Examination of the chest wall fails to reveal any obvious abnormalities. The lungs are clear to auscultation bilaterally without rales, rhonchi, or wheezing.   Cardiovascular: The heart has a regular rate and rhythm without murmurs, gallops or rubs.  Breast:  Right: Examination of right breast reveals an enlarged breast with erythematous change to almost the entire breast which was acute onset, a palpable nodule at the axillary line which could be a satellite nodule located at my fingertip in the second photo. There is nipple retractions present.            Left: Examination of the left breast fails to reveal any dominant masses or areas of significant focal nodularity. The nipple is everted without evidence of discharge. There is no skin dimpling with movement of the pectoralis. There are no significant skin changes overlying the breast.  Abdomen: The abdomen is soft, flat, nontender and nondistended with no palpable masses or organomegaly.  Integumentary: no rashes or skin lesions present  Neurologic: cranial nerves intact, no signs of peripheral neurological deficit, motor/sensory function intact    Assessment and Discussion:      Cancer Staging   Breast cancer metastasized to axillary lymph node, " right  Staging form: Breast, AJCC 8th Edition  - Clinical stage from 1/10/2023: Stage IIIC (cT4d, cN3c(f), cM0, G3, ER-, WA-, HER2-) - Signed by Sivla Shields MD on 1/11/2023        Encounter Diagnoses   Name Primary?    Breast cancer metastasized to axillary lymph node, right       We discussed her imaging and pathology results in detail. She has history consistent with inflammatory breast cancer.    We discussed the need to proceed with local and systemic treatment. Local treatment options are surgery and radiation. The choices for surgical operations include mastectomy and lumpectomy with radiation. The general operative procedure of mastectomy, the skin sparing nature and attempts made to save underlying muscle with modified mastectomy were discussed. The options of primary reconstruction following mastectomy include either implant reconstruction or tissue transfer type of reconstruction. The pros and cons of both of these reconstructive methods were addressed. Both of these are performed in stages and second operation is usually required before the final completion of the reconstructive process. I also explained to the patient that the reconstructive options are generally by law required to be covered by insurance and would be considered part of a cancer operation and not a cosmetic operation. Sometimes also a reduction or mastopexy is performed on the opposite side for better match, and this operation by itself is also considered part of the cancer treatment.     Following explanation of the mastectomy option, I then explained to her the procedure of lumpectomy. The rationale for lumpectomy, the need to obtain clear margins was specifically addressed. The absolute necessity of adding radiation following lumpectomy with good margins was explained. The logistics of radiation were discussed at length. The patient will further discuss details of radiation with her Radiation Oncologist.     I informed her of  the complications which include surgical site infections, hematoma or seroma requiring operation, necrosis of nipple-areola and/or mastectomy flaps requiring debridement or hyperbaric therapy, unplanned re-operations, and delay in adjuvant treatments.     Following this, I also explained to her the procedure of sentinel lymph node mapping and its rationale. We have a 98% success rate identifying the sentinel node. The need to use radioactive dye and blue dye to ensure proper identification of the node was explained. MagTrace brown dye can also be used in the setting of a mastectomy. Also the small but real risk of anaphylactic shock with blue dye or MagTrace dye was discussed. The need to perform completion dissection should the sentinel lymph node be positive was also explained to the patient.     Specifically, we went over the local recurrence rate and survival rates with mastectomy and breast conserving therapy, the small but real risk of lymphedema should we need completion dissection, and the indications for post mastectomy radiation in certain subset of patients.    Following this, I have also briefly discussed with her the rationale for adjuvant systemic treatment in the form of either chemotherapy and/or hormonal therapy. This would depend on the presence of hormone receptors in the tumor. Further recommendations regarding the kind of chemo and the cycles would be finalized by Medical Oncology. She will discuss these issues at length with her medical oncologist.        Given her disease process chemotherapy will be highly recommended. She requires chemotherapy and has venous insufficiency.     I then spent about twenty minutes with the patient explaining the procedure of the port placement.  I explained to her that this would be an outpatient procedure with local anesthesia and sedation.  The port would be placed under her skin with a catheter which runs into the major vein in her neck or chest.  I explained  to her my initial access site would be the subclavian vein which runs beneath the clavicle. If not successful, then the second option would be to do another incision in the neck to access the internal jugular vein.  The risks and complications of pain, bleeding, infection, blood clot, scarring, the inability to access the port, clogging of the port and pneumothorax/hemothorax requiring chest tube or additional surgery were all explained in detail. Informed consent was obtained, and we shall arrange the surgery at the earliest available opportunity.     I have spoken with Dr. Lejeune and Dr. Daugherty.     Plan:     1. Recommend Breast MRI ASAP - re: evaluation extent of disease - 1/11/23  2. PET scan already ordered and scheduled - 1/12/23  3. Recommend Genetic Testing - newly diagnosed triple negative right breast cancer  4. Surgery - Mediport placement  Tentative Date: 1/23/2023, trying to push this up if needed  5. Preop - CBC, CMP, EKG  6. Surgical instructions and information were discussed in detail        All of questions were answered    Silva Shields MD  Breast Surgical Oncology     OFFICE VISIT CODING:    Non-face-to-face time included:  Yes Preparing to see the patient such as reviewing the patient record  Yes Obtaining and reviewing separately obtained history  Yes Independently interpreting results  Yes Documenting clinical information in electronic health record  No Ordering appropriate medications  Yes Ordering appropriate tests  Yes Ordering appropriate procedures (including follow-up)  Yes Referring and communicating with other health care professionals (not separately reported)  Yes Care Coordination (not separately reported)    Face-to-face time included:  Yes Performing a medically necessary appropriate history, examination, and/or evaluation  Yes Communicating results to the patient/family/caregiver  Yes Counseling and educating the patient/family/caregiver  Yes Answering patient/family/caregiver  questions    Total Time: 73 minutes    Total time includes both face-to-face and non-face-to-face time personally spent by myself on the day of the visit.

## 2023-01-11 PROBLEM — C50.911 BREAST CANCER METASTASIZED TO AXILLARY LYMPH NODE, RIGHT: Status: ACTIVE | Noted: 2023-01-01

## 2023-01-11 PROBLEM — C77.3 BREAST CANCER METASTASIZED TO AXILLARY LYMPH NODE, RIGHT: Status: ACTIVE | Noted: 2023-01-01

## 2023-01-11 NOTE — TELEPHONE ENCOUNTER
Breast Mri scheduled for 1/11/2023 at 1500, with arrival at 1430.  Patient was instructed to be NPO for 4 hours prior to procedure.  She verbalized understanding.

## 2023-01-12 NOTE — NURSING
Met with patient after her consult with Dr. Shields.  Patient had family member present during consultation.  I informed the patient that I refer my patients to [facility] and [employer] as places to obtain any resources she may need during the course of her treatment.  She verbalized understanding, and gave her consent to be referred to these organizations.  The educational program, Nurse ARTIE, was viewed by the patient.  The Distress Screening Tool was used, and the patient rated her stress as a 3.  She as a great support system in place.  Patient was emotional at times during her consultation, and support was given to patient. The [employer] Education binder was given to the patient.  All patient's questions were answered.

## 2023-01-13 NOTE — TELEPHONE ENCOUNTER
Patient called with complaints of right upper back pain.  She states that when she moves a certain way she has pain.  She states the pain started after her Breast MRI, and was in lower back and now upper.  Instructed patient that could be related to postioning for MRI.  Instructed patient to take Aleve, and may use heat to that area.  She denies any pain to her right breast, only to back.  Patient verbalized understanding.

## 2023-01-18 NOTE — PROGRESS NOTES
Subjective:       Patient ID: Esme Marie is a 57 y.o. female.    Chief Complaint: No chief complaint on file.    Diagnosis: Right Triple Negative Breast Cancer    Current Treatment: None    Treatment History: N/A    HPI:   56yo F who presents for evaluation of R Breast Cancer. She noticed R breast skin changes in  just prior to her already planned annual MMG. Her breast changes including skin changes, darkening, nipple retraction, and breast firmness has developed rapidly over the last month since diagnosis - made worse after her MMG and Breast MRI. She denies any fatigue, pain, chest pain, shortness of breath, headaches, changes to vision, or other complaints. Energy has been stable. Tolerating PO without issue.       OB/GYN History:  Age at Menarche Onset: 14  Menopausal Status: postmenopausal, LMP: 2019  Hysterectomy/Oophorectomy: menopause, at age 54  Hormonal birth control (duration): Yes starting at age 17 used for 2 years.   Pregnancy History:   Age at first live birth: 16  Hormone Replacement Therapy: No, none  Patient did not breast feed    Past Medical History:   Diagnosis Date    Breast cancer     Hyperlipidemia     Knee pain     Menopausal flushing     Menopause 3/2018    Normocytic normochromic anemia     Vitamin D deficiency       Past Surgical History:   Procedure Laterality Date    TUBAL LIGATION       Social History     Socioeconomic History    Marital status: Single   Occupational History    Occupation: [employer]   Tobacco Use    Smoking status: Never    Smokeless tobacco: Never   Substance and Sexual Activity    Alcohol use: Yes     Comment: occassionally    Drug use: Never    Sexual activity: Yes     Partners: Female, Male     Social Determinants of Health     Financial Resource Strain: Low Risk     Difficulty of Paying Living Expenses: Not hard at all   Food Insecurity: No Food Insecurity    Worried About Running Out of Food in the Last Year: Never true     Ran Out of Food in the Last Year: Never true   Transportation Needs: No Transportation Needs    Lack of Transportation (Medical): No    Lack of Transportation (Non-Medical): No   Physical Activity: Inactive    Days of Exercise per Week: 0 days    Minutes of Exercise per Session: 0 min   Stress: No Stress Concern Present    Feeling of Stress : Only a little   Social Connections: Moderately Isolated    Frequency of Communication with Friends and Family: More than three times a week    Frequency of Social Gatherings with Friends and Family: More than three times a week    Attends Scientologist Services: 1 to 4 times per year    Active Member of Clubs or Organizations: No    Attends Club or Organization Meetings: Never    Marital Status: Never    Housing Stability: Unknown    Unable to Pay for Housing in the Last Year: No    Unstable Housing in the Last Year: No      Family History   Problem Relation Age of Onset    Hypertension Mother     Diabetes Mother     Cancer Father 62      Review of patient's allergies indicates:  No Known Allergies   Review of Systems   Constitutional:  Negative for appetite change and unexpected weight change.   HENT:  Negative for mouth sores.    Eyes:  Negative for visual disturbance.   Respiratory:  Negative for shortness of breath.    Cardiovascular:  Negative for chest pain.   Gastrointestinal:  Negative for abdominal pain and diarrhea.   Genitourinary:  Negative for frequency.   Musculoskeletal:  Positive for back pain.   Integumentary:  Negative for rash.   Neurological:  Negative for headaches.   Hematological:  Positive for adenopathy.   Psychiatric/Behavioral:  The patient is not nervous/anxious.        Objective:      Vitals:    01/17/23 1319   BP: (!) 143/82   Pulse: 77   Resp: 18   Temp: 97.9 °F (36.6 °C)       Physical Exam  Constitutional:       General: She is not in acute distress.     Appearance: Normal appearance. She is not ill-appearing.   HENT:      Head: Normocephalic  and atraumatic.      Nose: Nose normal.      Mouth/Throat:      Mouth: Mucous membranes are moist.      Pharynx: Oropharynx is clear.   Eyes:      Extraocular Movements: Extraocular movements intact.      Conjunctiva/sclera: Conjunctivae normal.      Pupils: Pupils are equal, round, and reactive to light.   Cardiovascular:      Rate and Rhythm: Normal rate and regular rhythm.      Pulses: Normal pulses.      Heart sounds: Normal heart sounds. No murmur heard.  Pulmonary:      Effort: Pulmonary effort is normal. No respiratory distress.      Breath sounds: Normal breath sounds.   Abdominal:      General: Bowel sounds are normal. There is no distension.      Palpations: Abdomen is soft.   Musculoskeletal:         General: No swelling. Normal range of motion.      Cervical back: Normal range of motion and neck supple.   Lymphadenopathy:      Cervical: No cervical adenopathy.   Skin:     General: Skin is warm and dry.   Neurological:      General: No focal deficit present.      Mental Status: She is alert and oriented to person, place, and time.     Chest - Left breast with skin erythema, thickening, and dimpling consistent with inflammatory changes throughout the entire breast. Breast is firm throughout. Nipple retraction. Right axillary and R supraclavicular LAD palpated. No abnormality noted in left breast or axilla.     LABS AND IMAGING REVIEWED IN EPIC    Imagin. 2022 BL DG MG/ R US BREAST LIMITED at [facility]- which revealed in R breast, a developing asymmetry extending from the retroareolar area to involve all quadrants of the right breast, anterior to posterior depth, measuring approximately 12 cm.  There is a 3.3 cm oval, obscured, equal density mass-like component in the lower central right breast, middle to posterior depth.  Triangle marker is noted on the upper outer right breast demarcating the site of clinical concern with underlying 1.7 cm focal asymmetry in the upper outer right breast, posterior  depth.  There is diffuse right breast skin thickening most prominent in the periareolar breast.  There is right nipple retraction.  There is a 3.8 cm oval, high density mass/abnormal lymph node with indistinct margin in the right axilla.   On R US, there is a 1.5 x 0.8 x 1.7 cm irregular, non parallel, heterogeneous dermal-subdermal mass with indistinct margin, minimal internal vascularity, and some posterior shadowing in the upper outer right breast at 10 o'clock 12 cm FN,  the site of patient indicated palpable lump, correlating with the mammographic focal asymmetry.  There is associated skin thickening of approximately 0.4 cm.  There is adjacent heterogeneous, echogenic signal within the subdermal fat measuring approximately 2.4 x 1.2 x 2.1 cm.   There is a 2.2 x 1.7 x 3.3 cm irregular, non parallel, hypoechoic mass with angular and indistinct margin, peripheral vascularity, and some posterior enhancement in the lower central right breast at 6 o'clock 9 cm FN, correlating with the mass-like mammographic finding.  There is skin thickening measuring approximately 0.8 cm and edema in the retroareolar right breast with loss of the normal soft tissue planes.  There is associated hyperemia.   There is a 5.3 x 3.9 x 5.5 cm irregular, non parallel, hypoechoic mass with angular and indistinct margins, peripheral vascularity, and posterior shadowing in the upper outer right breast at 10 o'clock 8 cm FN..     There is a 3.6 x 2.1 x 3.8 cm oval, hypoechoic mass/abnormal lymph node with indistinct margin, minimal internal vascularity, and posterior enhancement in the right axilla at 10 o'clock 14 cm FN. BIRADS-4 suspicious; biopsy recommended.       2. 1/5/2023 US SOFT TISSUE HEAD NECK AND THYROID at [facility]- which revealed at area of palpable complaint corresponds with a heterogeneous mixed echogenicity 1.1 x 1 x 0.6 cm lesion with internal color Doppler signal.  Deeper to this there is a 2nd, larger 3.5 x 2.7 x 2.1 cm mixed  cystic and solid lesion in the right supraclavicular space.  Cystic components may be related to necrosis or suppurative change.     MRI Breast 23  Findings consistent with advanced stage, aggressive right breast inflammatory carcinoma (particularly given rapid clinical onset) includin. Extensive right breast disease involving the right nipple (with retraction) and all quadrants with mass and non-mass enhancement measuring approximately 17.3 (AP) x 8.3 (TR) x 12 (CC) cm.  There is diffuse right breast skin thickening with dermal-subdermal enhancement along the lower inner and lower central right breast, anterior to posterior depth, and upper outer right breast, middle to posterior depth, likely correlating with the site of palpable lump.  2. Abnormal signal extending posteriorly to involve the right pectoralis major muscle (with asymmetric tenting) and inferiorly and laterally to involve the soft tissue overlying the right chest/upper abdominal wall at the level of the liver concerning for disease progression.    3. Right axillary 4.2 (AP) x 3 (TR) x 3 (CC) cm mass/replaced level 1 lymph node which is biopsy proven carcinoma.  Additional level 1 and level 2 masses/abnormal lymph nodes in conglomerate measuring approximately 4.2 (AP) x 7.1 (TR) which at least abut the pectoralis minor muscle.  Right supraclavicular 4 cm and 2.2 cm masses/abnormal lymph nodes and 1 cm right internal mammary lymph node.  4. Multiple enhancing sternal lesions concerning for metastatic disease, largest measuring approximately 1.5 cm.  5. Apparent kyphotic deformity of the mid thoracic vertebrae with retrolisthesis.  Recommend further evaluation with PET-CT and to assess for distant metastasis.     Pathology:  1. 2022 Ultrasound-guided Core Needle Biopsy Right Breast 6 o'clock 9 cm FN - Invasive carcinoma of no special type (ductal), grade 3   ER less than 1%  ME 0%  HER 2 Negative  Ki67 65%     2. 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 Incomplete staging at this time. Will need upfront chemotherapy - exact regimen pending final stage. Mediport placement scheduled with Dr. Shields on 1/23. Will obtain echo prior to initiation of therapy. Will need chemo education prior once staging and regimen is decided.         Plan:       - PET scan ASAP - will need PA/Peer review, I have ordered this myself to expedite the process  - MRI Brain w/wo  - Further follow up pending her scan timing  - Mediport 1/23    Elizabeth Kennedy LeJeune, MD  Hematology/Oncology   [facility]

## 2023-01-18 NOTE — PROGRESS NOTES
Your results have been reviewed and there are no concerns. Proceed with surgery as planned.     Please contact me if you have any additional concerns.    Sincerely,    Silva Shields

## 2023-01-20 NOTE — PROGRESS NOTES
Care Coordination    MRI Brain results reviewed and discussed with patient.   Will send in for Dexamethasone to pharmacy and place referral to radiation oncology.   She expressed understanding.     Elizabeth LeJeune, MD  Hematology/Oncology

## 2023-01-23 NOTE — BRIEF OP NOTE
[facility]  Brief Operative Note     SUMMARY     Surgery Date: 1/23/2023     Surgeon: Silva Shields MD    Assist: Barbara Sharp PA-C    Pre-op Diagnosis:  Breast cancer metastasized to axillary lymph node, right [C50.911, C77.3]    Post-op Diagnosis:  Post-Op Diagnosis Codes:     * Breast cancer metastasized to axillary lymph node, right [C50.911, C77.3]    Procedure(s) (LRB):  Placement, Mediport (N/A); left subclavian vein under fluoroscopic-guidance    Anesthesia: General/MAC    Findings/Key Components: Placement of left subclavian vein Mediport for chemotherapy    Estimated Blood Loss: 3 ml         Specimens:   Specimen (24h ago, onward)      None            Discharge Note    SUMMARY     Admit Date: 1/23/2023    Discharge Date and Time:  01/23/2023 8:52 AM    Hospital Course (synopsis of major diagnoses, care, treatment, and services provided during the course of the hospital stay): She is status post Mediport placement     Final Diagnosis: Post-Op Diagnosis Codes:     * Breast cancer metastasized to axillary lymph node, right [C50.911, C77.3]    Disposition: Home or Self Care    Follow Up/Patient Instructions:    Follow-up Information       Elizabeth K Lejeune, MD. Call.    Specialties: Oncology, Hematology and Oncology  Why: Follow up with Dr. Lejeune's office  Contact information:  [address] [facility] [address] [phone]                             Medications:  Reconciled Home Medications:      Medication List        START taking these medications      traMADoL 50 mg tablet  Commonly known as: ULTRAM  Take 1 tablet (50 mg total) by mouth every 6 (six) hours as needed for Pain.            CONTINUE taking these medications      dexAMETHasone 4 MG Tab  Commonly known as: DECADRON  Take 2 tablets (8 mg total) by mouth every 12 (twelve) hours.     fish oil-omega-3 fatty acids 300-1,000 mg capsule  Take by mouth once daily.     naproxen sodium 220 MG tablet  Commonly known  as: ANAPROX  Take 220 mg by mouth every 12 (twelve) hours. Takes as directed prn     vitamin D 1000 units Tab  Commonly known as: VITAMIN D3  Take 5,000 Units by mouth once daily.            Discharge Procedure Orders   Diet general     Ice to affected area     Lifting restrictions     Remove dressing in 24 hours   Order Comments: Leave glue and steri strips until clinic visit     Call MD for:  temperature >100.4     Call MD for:  persistent nausea and vomiting     Call MD for:  severe uncontrolled pain     Call MD for:  difficulty breathing, headache or visual disturbances     Call MD for:  redness, tenderness, or signs of infection (pain, swelling, redness, odor or green/yellow discharge around incision site)     Call MD for:  hives     Call MD for:  persistent dizziness or light-headedness      Follow-up Information       Elizabeth K Lejeune, MD. Call.    Specialties: Oncology, Hematology and Oncology  Why: Follow up with Dr. Lejeune's office  Contact information:  [address] [facility] [address] [phone]

## 2023-01-23 NOTE — ANESTHESIA POSTPROCEDURE EVALUATION
Anesthesia Post Evaluation    Patient: Esme Marie    Procedure(s) Performed: Procedure(s) (LRB):  Placement, Mediport (N/A)    Final Anesthesia Type: general      Patient location during evaluation: PACU  Patient participation: No - Unable to Participate, Sedation  Level of consciousness: sedated  Post-procedure vital signs: reviewed and stable  Pain management: adequate  Airway patency: patent  ESTHER mitigation strategies: Multimodal analgesia  PONV status at discharge: No PONV  Anesthetic complications: no      Cardiovascular status: stable  Respiratory status: nasal cannula  Hydration status: euvolemic  Follow-up not needed.          Vitals Value Taken Time   /84 01/23/23 0619   Temp 36.6 °C (97.8 °F) 01/23/23 0613   Pulse 73 01/23/23 0619   Resp 18 01/23/23 0613   SpO2 98 % 01/23/23 0619         No case tracking events are documented in the log.      Pain/Jorge Score: Pain Rating Prior to Med Admin: 0 (1/23/2023  6:43 AM)

## 2023-01-23 NOTE — OP NOTE
DATE OF PROCEDURE: 1/23/2023    SURGEON: Silva Shields M.D.    ASSISTANT:  Barbara Sharp PA-C    Medical Student: Elizabeth Coreas MS-4    PREOPERATIVE DIAGNOSIS: Breast cancer metastasized to axillary lymph node, right [C50.911, C77.3]     POSTOPERATIVE DIAGNOSIS: Post-Op Diagnosis Codes:     * Breast cancer metastasized to axillary lymph node, right [C50.911, C77.3]     ANESTHESIA: General/MAC Anesthesiologist: Hero Abdi Jr., MD  CRNA: Camilo Howell CRNA     PROCEDURES PERFORMED:   1.  Left subclavian vein Mediport Placement under fluoroscopic guidance  Placement, Mediport:        PROCEDURE IN DETAIL:   She is in need of adjuvant chemotherapy and has venous insufficiency. I explained to her that this would be an outpatient procedure with local anesthesia and sedation. The port would be placed under her skin with a catheter which runs into the major vein in her neck or chest. I explained to her my initial access site would be the subclavian vein beneath the clavicle on the left side. If not successful, then the second option would be to do another incision in the neck to access the internal jugular vein. The risks and complications of pain, bleeding, infection, blood clot, scarring, the inability to access the port, clogging of the port, and pneumo/hemothorax with possible chest tube or additional surgery were all explained in detail. Informed consent was obtained.    The patient was then brought to the operating room and placed supine on the operative table. Sedation anesthesia was initiated. The skin of the Left and Right neck, chest, and shoulder were prepped and draped in standard sterile surgical fashion. A time-out was completed verifying correct patient, procedure, site, positioning and equipment prior to beginning the procedure.     The Mediport placement  The skin of the Left and Right neck, chest, and shoulder were prepped and draped in standard sterile surgical fashion. An incision was planned  of the left infraclavicular area about 2 fingerbreadths below the clavicle. The incision was made with a 15-blade. The subcutaneous tissue was dissected using electrocautery. Hemostasis was checked and maintained. Using Seldinger technique the left subclavian vein was accessed. A guide-wire was then introduced. Using the C-Arm the positioning of the wire was checked and determine to be in the appropriate location. A sheath was then inserted over the guide-wire and the inner cannula and guide-wire were both removed. The catheter which had already been flushed and checked was then inserted into the subclavian vein. The C-Arm was then used to confirm and adjust the catheter so that it was positioned in the superior vena cava. The length of the catheter was measure at 20 cm from the entry point.    A subcutaneous pocket was then created for the Mediport. The catheter was cut and attached to the Mediport. The port was flushed and checked with Heparinized solution. The port was placed into the pocket. The Mediport was checked and flushed with heparinized solution. The subcutaneous tissues were closed with 3-0 Monocryl and the skin closed with running 4-0 Monocryl subcuticular stitches. Dermabond was applied followed by sterile dressings.     Significant Surgical Tasks Conducted by the Assistant(s), if Applicable: The skilled assistance of the Physician Assistant, Barbara Sharp PA-C, was necessary for the successful completion of this case. She was essential for proper positioning of the patient, manipulation of instruments, proper exposure, manipulation of tissue, and wound closure.       ESTIMATED BLOOD LOSS: 3 ml    Implants:   Implant Name Type Inv. Item Serial No.  Lot No. LRB No. Used Action   PORT POWER SLIM - SNA Port PORT POWER SLIM NA [employer] [place] [id number] Left 1 Implanted       Specimens:   Specimen (24h ago, onward)      None           Post-Op CXR reviewed - Mediport catheter positioned over the  SVC-Atrial junction and no evidence for pneumothorax.           Condition: Good    Disposition: PACU - hemodynamically stable.    Attestation: I performed the procedure.

## 2023-01-23 NOTE — ANESTHESIA PREPROCEDURE EVALUATION
01/23/2023  Esme Marie is a 57 y.o., female presents as an outpatient for MediPort placement (breast cancer).  EKG normal sinus rhythm    Last 3 sets of Vitals    Vitals - 1 value per visit 1/18/2023 1/23/2023 1/23/2023   SYSTOLIC - - 137   DIASTOLIC - - 84   Pulse - - 73   Temp - 97.8 -   Resp - 18 -   SPO2 - - 98   Weight (lb) 232.37 228.4 -   Weight (kg) 105.4 103.6 -   Height 65 - -   BMI (Calculated) 38.7 38 -   VISIT REPORT - - -   Waist Measurements - - -   Pain Score  - - -         Lab Results   Component Value Date    WBC 5.2 01/18/2023    HGB 12.1 01/18/2023    HCT 39.8 01/18/2023    MCV 92.1 01/18/2023     01/18/2023          BMP  Lab Results   Component Value Date     01/18/2023    K 4.6 01/18/2023    CO2 27 01/18/2023    BUN 13.8 01/18/2023    CREATININE 0.82 01/18/2023    CALCIUM 9.8 01/18/2023    EGFRNONAA 79 (L) 05/14/2021      Results for orders placed during the hospital encounter of 01/18/23    Echo    Interpretation Summary  · Normal systolic function.  · The estimated ejection fraction is 63%.  · Normal left ventricular diastolic function.  · Normal right ventricular size with normal right ventricular systolic function.  · Normal central venous pressure (3 mmHg).  · The estimated PA systolic pressure is 22 mmHg.     Pre-op Assessment    I have reviewed the Patient Summary Reports.    I have reviewed the NPO Status.   I have reviewed the Medications.     Review of Systems  Anesthesia Hx:   Denies Personal Hx of Anesthesia complications.   Social:  Non-Smoker    Hematology/Oncology:        Current/Recent Cancer. Breast   Cardiovascular:  Functional Capacity good / => 4 METS    Endocrine:  Obesity / BMI > 30      Physical Exam  General: Well nourished, Cooperative, Alert and Oriented    Airway:  Mallampati: II   Mouth Opening: Normal  TM Distance: Normal  Tongue:  Normal  Neck ROM: Normal ROM    Dental:  Intact    Chest/Lungs:  Clear to auscultation, Normal Respiratory Rate    Heart:  Rate: Normal  Rhythm: Regular Rhythm        Anesthesia Plan  Type of Anesthesia, risks & benefits discussed:    Anesthesia Type: Gen Supraglottic Airway  Intra-op Monitoring Plan: Standard ASA Monitors  Post Op Pain Control Plan: multimodal analgesia and IV/PO Opioids PRN  Induction:  IV  Airway Plan: Direct  Informed Consent: Informed consent signed with the Patient and all parties understand the risks and agree with anesthesia plan.  All questions answered.   ASA Score: 2  Day of Surgery Review of History & Physical: H&P Update referred to the surgeon/provider.    Ready For Surgery From Anesthesia Perspective.     .

## 2023-01-23 NOTE — DISCHARGE INSTRUCTIONS
Patient Education        How to Care for a Portacath   About this topic   A central line catheter is a very long intravenous (IV) line. A portacath or port is a type of central line. A port has 2 main parts, a round disc and a long tube called a catheter. The port and catheter are completely inside your body. The port looks like a small bump under your skin. It is most often in the chest area, but may be in your upper arm or lower abdomen. The long catheter goes under the skin and into a large vein that leads just above your heart.  Some ports can be used to inject dye when you have special tests. This is a power-injectable port. Ask your doctor about your port before you have any tests. Also, your doctor will give you a card that tells information about your port. Keep this with you and share it with your other doctors.  Once this port is in place, the doctor or nurse will use a special needle to get into the disc part. This is called accessing the port. The needle can stay in for a week at a time and will be covered with a clear bandage and taped in place. When the needle is not in the port, you will not have any holes in your skin because the port is under the skin. It can stay in place for months or years until your treatment is no longer needed. This kind of IV is used to draw blood for tests whenever possible to save your other veins from needlesticks.  General   Always wash your hands carefully before you touch your port, needle, tubing, or dressing. Everyone who touches your port or access needle should wash their hands first and wear gloves.       When you are at home, there may be times when your port is still accessed. This means it still has a needle in place.  Do not get your port wet while the needle is in place. Take a sponge bath or cover the area with plastic wrap before you shower.  Make sure your dressing stays clean and dry. This will help keep the needle secure and in place.  If your port is  not accessed, it will just look like a small raised area under the skin.  You can shower, take a bath, or swim in a pool.  You do not need to have a dressing over your port.  Wear clothes that do not rub on your port.  Talk to your doctor about what activities are safe for you.  Flush your port before and after drugs and blood draws. Flush your port at least one time every 4 weeks. Talk to your doctor about what you should use to flush your port with.  What care is needed at home?   This is what you will need to know:  You can take the bandage off in 24 hours.  Wash your hands before and after touching your dressing and incision. You may take a bath or shower after removing the bandage. Wash your incision with soap and water, pat dry, then leave open to air. The skin glue (dermabond) will peel off within 1-2 weeks. Do not peel or pick at it.  Check your incision daily for signs and symptoms of infection: redness, drainage or pus, foul odor, warmth, and swelling.   No lifting anything over 10 pounds (4.5 kg) until the doctor say otherwise.  Gradually go back to your normal activities like work, driving, or sex.  You may use an ice pack for comfort and swelling. Place ice pack over incision with a towel between your skin and the ice pack. Leave on 15 minutes and take off. Repeat 3-4 times during the day.   How to care for your port.  Wash your hands with soap and water for at least 30 seconds before touching the needle or port. Do not allow anyone to touch your needle or port with dirty hands or if they have not had special training.  If there is no needle in it, the port will be fully under your skin and you will be able to do your normal activities.  Your port will need to be flushed so a clot does not form at the end. A nurse will flush the port with saline and then heparin to avoid having a clot form inside or at the end of the tube. The nurse will do this each time you are finished getting fluid or drugs, and  when the it's time to pull the needle out of your skin. If you are allergic to heparin or had a bad side effect from it, talk to your doctor.  You will get an ID card that will tell other people that you have an implanted port. It is very important to keep this card with you at all times to let doctors and nurses know what kind of port you have.  Will physical activity be limited?   You may have to limit your activity when you have a port. Avoid activities that make you tired. Avoid sports where you might get hit in the port. Talk to your doctor about the right amount of activity for you.  Avoid activities that pull on your arms or chest.  No pulling with arm on the side of the mediport and not picking up heavy objects with the arm on the side of the port.  What problems could happen?   Infection  Bruising  Blood clot  Scar inside the large blood vessel if the port is in for a long time  Catheter moves to wrong position inside the vein  Fluid leaks into the surrounding skin and tissue  When do I need to call the doctor?   Fever of 100.4°F (38°C) or higher  Problems with your port site like:  Site starts bleeding and does not stop with gentle pressure  Site becomes more red, or the skin over or around your port breaks open  Rash, bumps, itching, or irritation where the dressing goes onto the skin  Drainage or pus from the port site  Problems with the port like:  Your port seems to have moved under your skin  Not able to get the needle into the port  Pain, irritation, or swelling when you flush the port  Not able to get the drugs or flush solution through the port  Not able to get a blood return from your port  You have any concerns about your port  Sudden shortness of breath or chest pain  Pain in your arm or chest when you flush the port  Swishing sound in one of your ears when you flush the port  Swelling in your neck, jaw, face, or upper chest  Your veins in your face, upper arms, neck, or chest are swelling up or  sticking out  Change in skin color on your upper chest, neck, jaw, or face

## 2023-01-24 NOTE — PROGRESS NOTES
REFERRING PHYSICIAN: Dr. Elizabeth Lejeune    Subjective:       Patient ID: Esme Marie is a 57 y.o. female.    Chief Complaint: Personal history of recently diagnosed triple negative breast cancer and a family history of cancer. Patient presented via Telemedicine Visit (audio and video) today for risk assessment, genetic counseling, and consideration for genetic testing.    HPI  Past Medical History:   Diagnosis Date    Breast cancer     Hyperlipidemia     Knee pain     Menopausal flushing     Menopause 3/2018    Normocytic normochromic anemia     Vitamin D deficiency         Past Surgical History:   Procedure Laterality Date    TUBAL LIGATION          Review of patient's allergies indicates:  Patient has no known allergies.     Review of Systems      Oncology History    No history exists.      Family History   Problem Relation Age of Onset    Hypertension Mother     Diabetes Mother     Lung cancer Father 62          Assessment:   Risk Assessment:  This patient is at increased risk of having a genetic mutation that increases the risk of cancer. She meets criteria for genetic testing based on the National Comprehensive Cancer Network (NCCN) criteria due to a personal history of triple negative breast cancer diagnosed under 60y (dx57y)  (see family history and pedigree). Based on her likelihood of having a mutation, BRCA1/2 Analysis with myRisk testing through [facility] was described in detail.    Education and Counseling:  [facility] is a gene panel that evaluates a broad number of hereditary cancer syndromes to help define patients' cancer risk with a focus on eight primary cancer sites (breast, ovarian, gastric, colorectal, pancreatic, melanoma, prostate, and endometrial). This test is appropriate for patients that meet criteria for genetic testing for Breast and Ovarian Cancer Syndrome. This panel will test for genes that increase cancer risk APC, FABRICE, AXIN2, BAP1, BARD1, BMPR1A, BRCA1,  BRCA2, BRIP1, CDH1, CDK4, CDKN2A, CHEK2, CTNNA1, FH, FLCN, HOXB13 (seq only), MEN1, MET, MLH1, MSH2, MSH3 (excluding repetitive portions of exon 1), MSH6, MUTYH, NTHL1, PALB2, PMS2, PTEN, RAD51C, RAD51D, SDHA, SDHB, SDHC, SDHD, SMAD4, STK11, TP53, TSC1, TSC2, VHL.    Risks of cancer associated with inherited cancer predisposition mutations were discussed in detail.  If a mutation were found, this patient would have a significantly increased risk for cancer.  It has been shown that individuals with a BRCA1 or BRCA2 mutation face a 56-87% lifetime risk of breast cancer and a 27-44% lifetime risk of ovarian cancer. Mutation carriers who have had breast cancer have a 20% (BRCA1) or 12% (BRCA2) chance of developing a second breast cancer within 5 years, and up to 64% (BRCA1) or 52% (BRCA2) of a second breast cancer by age 70. Mutation carriers have up to a 5% risk of pancreatic cancer, as well as increased risk for other cancers such as colon cancer and lymphoma. Other inherited cancer syndromes that are also included in the myRisk test, may have different, but still significant risk for cancer.    The availability of clinical management options for inherited cancer predisposition mutation carriers was discussed, including increased surveillance, chemo prevention, and prophylactic surgery. Details of the testing process, including benefits and limitations of genetic analysis as well as the implications of possible test results, were discussed.  Because this patient is the first member of her family to be tested comprehensive testing was presented.  Related insurance issues were discussed.      Summary:  This patient was evaluated for hereditary risk of cancer and was found to be at an increased risk of having an inherited cancer predisposition mutation.  The option of genetic testing was explained in detail, including the possible impact of this information on family members.  Since this patient wishes to proceed with  testing an informed consent will be obtained and blood drawn and sent to [facility].  Results will be expected 4 weeks from this time.  A follow-up appointment will be scheduled for results disclosure.        The patient location is: home    Visit type: audiovisual    Face to Face time with patient: 35 minutes  >60 minutes of total time spent on the encounter, which includes face to face time and non-face to face time preparing to see the patient (eg, review of tests), Obtaining and/or reviewing separately obtained history, Documenting clinical information in the electronic or other health record, Independently interpreting results (not separately reported) and communicating results to the patient/family/caregiver, or Care coordination (not separately reported).         Each patient to whom he or she provides medical services by telemedicine is:  (1) informed of the relationship between the physician and patient and the respective role of any other health care provider with respect to management of the patient; and (2) notified that he or she may decline to receive medical services by telemedicine and may withdraw from such care at any time.      ALVARO KELLY, PhD

## 2023-01-27 NOTE — PROGRESS NOTES
Oncology Nutrition Evaluation      Esme Marie   1965    Oncology Provider:   Elizabeth Lejeune, MD    Reason for Visit:  New Treatment Education    Oncology/Hematology Diagnosis:   Breast CA    Treatment Plan:  Gemcitabine/carbo    Nutrition Recommendations:  1. Regular diet as tolerated    Nutrition Assessment    1/27/23: This is a 57 y.o.female with a medical diagnosis of breast CA. She reports good appetite and po intake. No c/o n/v/c/d. Wt has been stable. She tolerates a regular diet at home.    Nutrition Factors Affecting Intake  none identified    PMHx: HLD    Allergies: Patient has no known allergies.    Current Medications:    Current Outpatient Medications:     dexAMETHasone (DECADRON) 4 MG Tab, Take 2 tablets (8 mg total) by mouth every 12 (twelve) hours., Disp: 60 tablet, Rfl: 1    [START ON 1/31/2023] dexAMETHasone (DECADRON) 4 MG Tab, Take 2 tablets (8 mg total) by mouth once daily. Take as directed on days 2, 3, and 4 of your chemotherapy cycle., Disp: 24 tablet, Rfl: 5    naproxen sodium (ANAPROX) 220 MG tablet, Take 220 mg by mouth every 12 (twelve) hours. Takes as directed prn, Disp: , Rfl:     [START ON 1/30/2023] OLANZapine (ZYPREXA) 5 MG tablet, Take 1 tablet (5 mg total) by mouth every evening. Take as directed days 1-4 of each chemotherapy cycle., Disp: 30 tablet, Rfl: 1    omega-3 fatty acids/fish oil (FISH OIL-OMEGA-3 FATTY ACIDS) 300-1,000 mg capsule, Take by mouth once daily., Disp: , Rfl:     [START ON 1/30/2023] ondansetron (ZOFRAN-ODT) 8 MG TbDL, Take 1 tablet (8 mg total) by mouth every 8 (eight) hours as needed (nausea/vomiting)., Disp: 60 tablet, Rfl: 5    prochlorperazine (COMPAZINE) 10 MG tablet, Take 1 tablet (10 mg total) by mouth every 6 (six) hours as needed (for nausea). 2nd choice, can cause drowsiness., Disp: 30 tablet, Rfl: 3    traMADoL (ULTRAM) 50 mg tablet, Take 1 tablet (50 mg total) by mouth every 6 (six) hours as needed for Pain., Disp: 28 tablet, Rfl:  "0    vitamin D (VITAMIN D3) 1000 units Tab, Take 5,000 Units by mouth once daily., Disp: , Rfl:     Labs: no new    Anthropometrics    Height:   Ht Readings from Last 1 Encounters:   01/15/23 5' 5" (1.651 m)      Weight:   Wt Readings from Last 3 Encounters:   01/23/23 103.6 kg (228 lb 6.3 oz)   01/18/23 105.4 kg (232 lb 5.8 oz)   01/17/23 105.4 kg (232 lb 6.4 oz)        Usual Body Weight: 104 kg  % Weight Change: 0    BMI: 37.9 (obese II)    Ideal Weight: 56.7 kg (125 lb)  % Ideal Weight: 182%      Nutrition Diagnosis    PES: Food and nutrition related knowledge deficit related to breast CA as evidenced by lack of prior exposure to onc nutrition. (resolved)    Nutrition Risk  low    Nutrition Intervention    Interventions(treatment strategy):  Education Provided: antimicrobial/neutropenic  Teaching Method: explanation and printed materials  Comprehension: verbalizes understanding  Barriers to Learning: none evident  Expected Compliance: good  Comments: All questions were answered and dietitian's contact information was provided.        Nutrition Monitoring and Evaluation    Ongoing monitoring not warranted at this time. Please consult [facility].        Thelma Damon, MS, RD, , LDN                                                                                                                                                                                                                                                                                   "

## 2023-01-27 NOTE — NURSING
I spoke with Esme for her patient education appointment over the phone. She is in good spirits. Denied feelings of anxiety or depression. I will refer her to the [facility] will be traveling to and from [place] for treatments. She travels to [place] for work but for treatments she will stay with her daughter. I will provide her with a gas card. She appreciates the support and will reach out if any needs arise.

## 2023-01-30 PROBLEM — C79.51 BREAST CANCER METASTASIZED TO BONE, UNSPECIFIED LATERALITY: Status: ACTIVE | Noted: 2023-01-01

## 2023-01-30 PROBLEM — C50.919 BREAST CANCER METASTASIZED TO BONE, UNSPECIFIED LATERALITY: Status: ACTIVE | Noted: 2023-01-01

## 2023-01-30 NOTE — PROGRESS NOTES
Subjective:       Patient ID: Esme Marie is a 57 y.o. female.    Chief Complaint: No chief complaint on file.    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 Elliott  Carbo/[place] - C1 23    Treatment History: N/A    HPI:   56yo F who presents for evaluation of R Breast Cancer. She noticed R breast skin changes in  just prior to her already planned annual MMG. Her breast changes including skin changes, darkening, nipple retraction, and breast firmness has developed rapidly over the last month since diagnosis - made worse after her MMG and Breast MRI. She denies any fatigue, pain, chest pain, shortness of breath, headaches, changes to vision, or other complaints. Energy has been stable. Tolerating PO without issue.     Interval History  Patient presents for 2 week follow up. She has had staging imaging which showed evidence of brain mets and bone mets. We have decided to proceed with chemotherapy and brain radiation. She has undergone chemotherapy education and has her nausea medications at home. She has been taking Dex for brain edema since her MRI done over a week ago.     Today she is in good spirits. She has some RUE mild edema and occasional pain to her axilla. She has no pain to her breast. She denies any headaches, vision changes, seizures, weakness. She has no bone pain.       OB/GYN History:  Age at Menarche Onset: 14  Menopausal Status: postmenopausal, LMP: 2019  Hysterectomy/Oophorectomy: menopause, at age 54  Hormonal birth control (duration): Yes starting at age 17 used for 2 years.   Pregnancy History:   Age at first live birth: 16  Hormone Replacement Therapy: No, none  Patient did not breast feed      Past Medical History:   Diagnosis Date    Breast cancer     Hyperlipidemia     Knee pain     Menopausal flushing     Menopause 3/2018    Normocytic normochromic anemia     Vitamin D  deficiency       Past Surgical History:   Procedure Laterality Date    PLACEMENT, MEDIPORT N/A 01/23/2023    Procedure: Placement, Mediport;  Surgeon: Silva Shields MD;  Location: [facility];  Service: General;  Laterality: N/A;    TUBAL LIGATION       Social History     Socioeconomic History    Marital status: Single   Occupational History    Occupation: [employer]   Tobacco Use    Smoking status: Never    Smokeless tobacco: Never   Substance and Sexual Activity    Alcohol use: Yes     Comment: occassionally    Drug use: Never    Sexual activity: Yes     Partners: Male     Birth control/protection: None     Social Determinants of Health     Financial Resource Strain: Low Risk     Difficulty of Paying Living Expenses: Not hard at all   Food Insecurity: No Food Insecurity    Worried About Running Out of Food in the Last Year: Never true    Ran Out of Food in the Last Year: Never true   Transportation Needs: No Transportation Needs    Lack of Transportation (Medical): No    Lack of Transportation (Non-Medical): No   Physical Activity: Inactive    Days of Exercise per Week: 0 days    Minutes of Exercise per Session: 0 min   Stress: No Stress Concern Present    Feeling of Stress : Only a little   Social Connections: Moderately Isolated    Frequency of Communication with Friends and Family: More than three times a week    Frequency of Social Gatherings with Friends and Family: More than three times a week    Attends [employer] Services: 1 to 4 times per year    Active Member of Clubs or Organizations: No    Attends Club or Organization Meetings: Never    Marital Status: Never    Housing Stability: Unknown    Unable to Pay for Housing in the Last Year: No    Unstable Housing in the Last Year: No      Family History   Problem Relation Age of Onset    Hypertension Mother     Diabetes Mother     Arthritis Mother     Lung cancer Father 62      Review of patient's allergies indicates:  No Known Allergies   Review  of Systems   Constitutional:  Negative for appetite change and unexpected weight change.   HENT:  Negative for mouth sores.    Eyes:  Negative for visual disturbance.   Respiratory:  Negative for shortness of breath.    Cardiovascular:  Negative for chest pain.   Gastrointestinal:  Negative for abdominal pain and diarrhea.   Genitourinary:  Negative for frequency.   Musculoskeletal:  Positive for back pain.   Integumentary:  Negative for rash.   Neurological:  Negative for headaches.   Hematological:  Positive for adenopathy.   Psychiatric/Behavioral:  The patient is not nervous/anxious.        Objective:      Vitals:    01/30/23 1049   BP: 128/82   Pulse: 66   Resp: 18   Temp: 98 °F (36.7 °C)       Physical Exam  Constitutional:       General: She is not in acute distress.     Appearance: Normal appearance. She is not ill-appearing.   HENT:      Head: Normocephalic and atraumatic.      Nose: Nose normal.      Mouth/Throat:      Mouth: Mucous membranes are moist.      Pharynx: Oropharynx is clear.   Eyes:      Extraocular Movements: Extraocular movements intact.      Conjunctiva/sclera: Conjunctivae normal.      Pupils: Pupils are equal, round, and reactive to light.   Cardiovascular:      Rate and Rhythm: Normal rate and regular rhythm.      Pulses: Normal pulses.      Heart sounds: Normal heart sounds. No murmur heard.  Pulmonary:      Effort: Pulmonary effort is normal. No respiratory distress.      Breath sounds: Normal breath sounds.   Abdominal:      General: Bowel sounds are normal. There is no distension.      Palpations: Abdomen is soft.   Musculoskeletal:         General: No swelling. Normal range of motion.      Cervical back: Normal range of motion and neck supple.   Lymphadenopathy:      Cervical: No cervical adenopathy.   Skin:     General: Skin is warm and dry.   Neurological:      General: No focal deficit present.      Mental Status: She is alert and oriented to person, place, and time.     Chest -  Right breast with skin erythema, thickening, and dimpling consistent with inflammatory changes throughout the entire breast. Breast is firm throughout. Nipple retraction. Right axillary and R supraclavicular LAD palpated. No abnormality noted in left breast or axilla.     LABS AND IMAGING REVIEWED IN EPIC    Imagin. 2022 BL DG MG/ R US BREAST LIMITED at [facility]- which revealed in R breast, a developing asymmetry extending from the retroareolar area to involve all quadrants of the right breast, anterior to posterior depth, measuring approximately 12 cm.  There is a 3.3 cm oval, obscured, equal density mass-like component in the lower central right breast, middle to posterior depth.  Triangle marker is noted on the upper outer right breast demarcating the site of clinical concern with underlying 1.7 cm focal asymmetry in the upper outer right breast, posterior depth.  There is diffuse right breast skin thickening most prominent in the periareolar breast.  There is right nipple retraction.  There is a 3.8 cm oval, high density mass/abnormal lymph node with indistinct margin in the right axilla.   On R US, there is a 1.5 x 0.8 x 1.7 cm irregular, non parallel, heterogeneous dermal-subdermal mass with indistinct margin, minimal internal vascularity, and some posterior shadowing in the upper outer right breast at 10 o'clock 12 cm FN,  the site of patient indicated palpable lump, correlating with the mammographic focal asymmetry.  There is associated skin thickening of approximately 0.4 cm.  There is adjacent heterogeneous, echogenic signal within the subdermal fat measuring approximately 2.4 x 1.2 x 2.1 cm.   There is a 2.2 x 1.7 x 3.3 cm irregular, non parallel, hypoechoic mass with angular and indistinct margin, peripheral vascularity, and some posterior enhancement in the lower central right breast at 6 o'clock 9 cm FN, correlating with the mass-like mammographic finding.  There is skin thickening measuring  approximately 0.8 cm and edema in the retroareolar right breast with loss of the normal soft tissue planes.  There is associated hyperemia.   There is a 5.3 x 3.9 x 5.5 cm irregular, non parallel, hypoechoic mass with angular and indistinct margins, peripheral vascularity, and posterior shadowing in the upper outer right breast at 10 o'clock 8 cm FN..     There is a 3.6 x 2.1 x 3.8 cm oval, hypoechoic mass/abnormal lymph node with indistinct margin, minimal internal vascularity, and posterior enhancement in the right axilla at 10 o'clock 14 cm FN. BIRADS-4 suspicious; biopsy recommended.       2. 2023 US SOFT TISSUE HEAD NECK AND THYROID at [facility]- which revealed at area of palpable complaint corresponds with a heterogeneous mixed echogenicity 1.1 x 1 x 0.6 cm lesion with internal color Doppler signal.  Deeper to this there is a 2nd, larger 3.5 x 2.7 x 2.1 cm mixed cystic and solid lesion in the right supraclavicular space.  Cystic components may be related to necrosis or suppurative change.     MRI Breast 23  Findings consistent with advanced stage, aggressive right breast inflammatory carcinoma (particularly given rapid clinical onset) includin. Extensive right breast disease involving the right nipple (with retraction) and all quadrants with mass and non-mass enhancement measuring approximately 17.3 (AP) x 8.3 (TR) x 12 (CC) cm.  There is diffuse right breast skin thickening with dermal-subdermal enhancement along the lower inner and lower central right breast, anterior to posterior depth, and upper outer right breast, middle to posterior depth, likely correlating with the site of palpable lump.  2. Abnormal signal extending posteriorly to involve the right pectoralis major muscle (with asymmetric tenting) and inferiorly and laterally to involve the soft tissue overlying the right chest/upper abdominal wall at the level of the liver concerning for disease progression.    3. Right axillary 4.2 (AP) x 3  (TR) x 3 (CC) cm mass/replaced level 1 lymph node which is biopsy proven carcinoma.  Additional level 1 and level 2 masses/abnormal lymph nodes in conglomerate measuring approximately 4.2 (AP) x 7.1 (TR) which at least abut the pectoralis minor muscle.  Right supraclavicular 4 cm and 2.2 cm masses/abnormal lymph nodes and 1 cm right internal mammary lymph node.  4. Multiple enhancing sternal lesions concerning for metastatic disease, largest measuring approximately 1.5 cm.  5. Apparent kyphotic deformity of the mid thoracic vertebrae with retrolisthesis.  Recommend further evaluation with PET-CT and to assess for distant metastasis.    MRI Brain 1/19/23  Impression:  1. Multiple enhancing nodules in the posterior fossa with mild surrounding edema, concerning for metastatic disease.  2. Enhancing bone marrow lesions in the occipital bones, concerning for osseous metastases.    1/26/23 PET   Impression:  1. Diffuse FDG uptake throughout the right breast with overlying skin thickening, right axillary lymphadenopathy and uptake in the right chest wall musculature.  2. Bilateral hypermetabolic cervical lymphadenopathy.  3. Diffuse hypermetabolic osseous metastatic disease.    Pathology:  1. 12/30/2022 Ultrasound-guided Core Needle Biopsy Right Breast 6 o'clock 9 cm FN - Invasive carcinoma of no special type (ductal), grade 3   ER less than 1%  CA 0%  HER 2 Negative  Ki67 65%     2. 12/30/2022 Ultrasound-guided Core Needle Biopsy Right Axillary Mass/ Abnormal Lymph Node 10 o'clock 14 cm FN - Invasive carcinoma of no special type (ductal); no lymph node architecture identified        Assessment:   Stage IV Metastatic Triple Negative Inflammatory Breast Cancer - Diagnosed December 2022. Mets to brain and bone.   - Plan for Carboplatin/Gemcitabine Day 1, 8 Q21 for initial chemotherapy   - Will order prolia given bone mets  - Radiation Oncology following for brain mets  - Dr. Shields placed mediport on 1/23/23  - Genetics -  Pending        Plan:       - Proceed with C1D1 of Carbo/Gemcitabine on 1/31  - Rx [place] 5mg #90 sent to pharmacy today  - Order prolia for insurance approval given extensive bone metastasis  - Discussed nausea medication/instructions in detail  - RTC 1 week for MD visit, toxicity check  - Will plan for repeat PET in 3 months, beginning of May 2023    Elizabeth Kennedy LeJeune, MD  Hematology/Oncology   [facility]

## 2023-01-31 NOTE — NURSING
"Patient complains of right arm pain    right arm edematous   message to Md patient that she took pain  prior to arrival . "Starting  to ease off"  Comfort measures  warm blanket , water provided   Continues to complain of arm pain .  Elevated on pillow , ice paks applied .  Arelis Rivera from Dr Lejeune 's office.  Reassures patient that today's treatment  will address patient's pain and swelling. Additonal pain meds called in for pt.   At departure  patient states that her arm was beginning to feel better .  "

## 2023-01-31 NOTE — PROGRESS NOTES
THERAPY EDUCATION- GEMCITABINE + CARBOPLATIN   Established Patient - Video Telehealth Visit      The patient location is: Home  The chief complaint leading to consultation is: Therapy Education  Visit type: Virtual visit with Video Visual   Total time spent with patient: 60 mins       Each patient to whom I provide medical services by telemedicine is:  (1) informed of the relationship between the physician and patient and the respective role of any other health care provider with respect to management of the patient; and (2) notified that they may decline to receive medical services by telemedicine and may withdraw from such care at any time. Patient verbally consented to receive this Video service.    This service was not originating from a related E/M service provided within the previous 7 days nor will  to an E/M service or procedure within the next 24 hours or my soonest available appointment.  Prevailing standard of care was able to be met in this video visit.      Subjective:       Patient ID: Esme Marie is a 57 y.o. female.    Chief Complaint: Therapy Education    Diagnosis: Right Triple Negative Breast Cancer    Current Treatment: None    Treatment History: N/A    HPI:   58yo F who presents for evaluation of R Breast Cancer. She noticed R breast skin changes in December '22 just prior to her already planned annual MMG. Her breast changes including skin changes, darkening, nipple retraction, and breast firmness has developed rapidly over the last month since diagnosis - made worse after her MMG and Breast MRI.     Interval History: Ms. Marie presents today via video telemedicine for follow-up. She reports feeling well.  MRI of the brain 1/19/23 reveals multiple enhancing nodules in the posterior fossa with mild surrounding edema, concerning for metastatic disease, there is also enhancing bone marrow lesions in the occipital bones, concerning for osseous metastases. She has been started on  oral steroids which she is taking 8mg BID. She will be meeting with radiation oncology next week.      OB/GYN History:  Age at Menarche Onset: 14  Menopausal Status: postmenopausal, LMP: 2019  Hysterectomy/Oophorectomy: menopause, at age 54  Hormonal birth control (duration): Yes starting at age 17 used for 2 years.   Pregnancy History:   Age at first live birth: 16  Hormone Replacement Therapy: No, none  Patient did not breast feed    Past Medical History:   Diagnosis Date    Breast cancer     Hyperlipidemia     Knee pain     Menopausal flushing     Menopause 3/2018    Normocytic normochromic anemia     Vitamin D deficiency       Past Surgical History:   Procedure Laterality Date    PLACEMENT, MEDIPORT N/A 2023    Procedure: Placement, Mediport;  Surgeon: Silva Shields MD;  Location: [facility];  Service: General;  Laterality: N/A;    TUBAL LIGATION       Social History     Socioeconomic History    Marital status: Single   Occupational History    Occupation: [employer]   Tobacco Use    Smoking status: Never    Smokeless tobacco: Never   Substance and Sexual Activity    Alcohol use: Yes     Comment: occassionally    Drug use: Never    Sexual activity: Yes     Partners: Male     Birth control/protection: None     Social Determinants of Health     Financial Resource Strain: Low Risk     Difficulty of Paying Living Expenses: Not hard at all   Food Insecurity: No Food Insecurity    Worried About Running Out of Food in the Last Year: Never true    Ran Out of Food in the Last Year: Never true   Transportation Needs: No Transportation Needs    Lack of Transportation (Medical): No    Lack of Transportation (Non-Medical): No   Physical Activity: Inactive    Days of Exercise per Week: 0 days    Minutes of Exercise per Session: 0 min   Stress: No Stress Concern Present    Feeling of Stress : Only a little   Social Connections: Moderately Isolated    Frequency of Communication with Friends and Family:  More than three times a week    Frequency of Social Gatherings with Friends and Family: More than three times a week    Attends Buddhist Services: 1 to 4 times per year    Active Member of Clubs or Organizations: No    Attends Club or Organization Meetings: Never    Marital Status: Never    Housing Stability: Unknown    Unable to Pay for Housing in the Last Year: No    Unstable Housing in the Last Year: No      Family History   Problem Relation Age of Onset    Hypertension Mother     Diabetes Mother     Arthritis Mother     Lung cancer Father 62      Review of patient's allergies indicates:  No Known Allergies   Review of Systems   Constitutional:  Negative for appetite change and unexpected weight change.   HENT:  Negative for mouth sores.    Eyes:  Negative for visual disturbance.   Respiratory:  Negative for cough and shortness of breath.    Cardiovascular:  Negative for chest pain.   Gastrointestinal:  Negative for abdominal pain and diarrhea.   Genitourinary:  Negative for frequency.   Musculoskeletal:  Positive for back pain.   Integumentary:  Negative for rash.   Neurological:  Negative for headaches.   Hematological:  Positive for adenopathy.   Psychiatric/Behavioral:  The patient is not nervous/anxious.        Objective:      There were no vitals filed for this visit.      Physical Exam  Constitutional:       General: She is not in acute distress.     Appearance: Normal appearance. She is not ill-appearing.   HENT:      Head: Normocephalic and atraumatic.      Nose: Nose normal.      Mouth/Throat:      Mouth: Mucous membranes are moist.      Pharynx: Oropharynx is clear.   Eyes:      Extraocular Movements: Extraocular movements intact.      Conjunctiva/sclera: Conjunctivae normal.      Pupils: Pupils are equal, round, and reactive to light.   Cardiovascular:      Rate and Rhythm: Normal rate and regular rhythm.      Pulses: Normal pulses.      Heart sounds: Normal heart sounds. No murmur  heard.  Pulmonary:      Effort: Pulmonary effort is normal. No respiratory distress.      Breath sounds: Normal breath sounds.   Abdominal:      General: Bowel sounds are normal. There is no distension.      Palpations: Abdomen is soft.   Musculoskeletal:         General: No swelling. Normal range of motion.      Cervical back: Normal range of motion and neck supple.   Lymphadenopathy:      Cervical: No cervical adenopathy.   Skin:     General: Skin is warm and dry.   Neurological:      General: No focal deficit present.      Mental Status: She is alert and oriented to person, place, and time.     Chest - Left breast with skin erythema, thickening, and dimpling consistent with inflammatory changes throughout the entire breast. Breast is firm throughout. Nipple retraction. Right axillary and R supraclavicular LAD palpated. No abnormality noted in left breast or axilla.     LABS AND IMAGING REVIEWED IN EPIC    Imagin. 2022 BL DG MG/ R US BREAST LIMITED at [facility]- which revealed in R breast, a developing asymmetry extending from the retroareolar area to involve all quadrants of the right breast, anterior to posterior depth, measuring approximately 12 cm.  There is a 3.3 cm oval, obscured, equal density mass-like component in the lower central right breast, middle to posterior depth.  Triangle marker is noted on the upper outer right breast demarcating the site of clinical concern with underlying 1.7 cm focal asymmetry in the upper outer right breast, posterior depth.  There is diffuse right breast skin thickening most prominent in the periareolar breast.  There is right nipple retraction.  There is a 3.8 cm oval, high density mass/abnormal lymph node with indistinct margin in the right axilla.   On R US, there is a 1.5 x 0.8 x 1.7 cm irregular, non parallel, heterogeneous dermal-subdermal mass with indistinct margin, minimal internal vascularity, and some posterior shadowing in the upper outer right breast at  10 o'clock 12 cm FN,  the site of patient indicated palpable lump, correlating with the mammographic focal asymmetry.  There is associated skin thickening of approximately 0.4 cm.  There is adjacent heterogeneous, echogenic signal within the subdermal fat measuring approximately 2.4 x 1.2 x 2.1 cm.   There is a 2.2 x 1.7 x 3.3 cm irregular, non parallel, hypoechoic mass with angular and indistinct margin, peripheral vascularity, and some posterior enhancement in the lower central right breast at 6 o'clock 9 cm FN, correlating with the mass-like mammographic finding.  There is skin thickening measuring approximately 0.8 cm and edema in the retroareolar right breast with loss of the normal soft tissue planes.  There is associated hyperemia.   There is a 5.3 x 3.9 x 5.5 cm irregular, non parallel, hypoechoic mass with angular and indistinct margins, peripheral vascularity, and posterior shadowing in the upper outer right breast at 10 o'clock 8 cm FN..     There is a 3.6 x 2.1 x 3.8 cm oval, hypoechoic mass/abnormal lymph node with indistinct margin, minimal internal vascularity, and posterior enhancement in the right axilla at 10 o'clock 14 cm FN. BIRADS-4 suspicious; biopsy recommended.       2. 2023 US SOFT TISSUE HEAD NECK AND THYROID at [facility]- which revealed at area of palpable complaint corresponds with a heterogeneous mixed echogenicity 1.1 x 1 x 0.6 cm lesion with internal color Doppler signal.  Deeper to this there is a 2nd, larger 3.5 x 2.7 x 2.1 cm mixed cystic and solid lesion in the right supraclavicular space.  Cystic components may be related to necrosis or suppurative change.     MRI Breast 23  Findings consistent with advanced stage, aggressive right breast inflammatory carcinoma (particularly given rapid clinical onset) includin. Extensive right breast disease involving the right nipple (with retraction) and all quadrants with mass and non-mass enhancement measuring approximately 17.3 (AP)  x 8.3 (TR) x 12 (CC) cm.  There is diffuse right breast skin thickening with dermal-subdermal enhancement along the lower inner and lower central right breast, anterior to posterior depth, and upper outer right breast, middle to posterior depth, likely correlating with the site of palpable lump.  2. Abnormal signal extending posteriorly to involve the right pectoralis major muscle (with asymmetric tenting) and inferiorly and laterally to involve the soft tissue overlying the right chest/upper abdominal wall at the level of the liver concerning for disease progression.    3. Right axillary 4.2 (AP) x 3 (TR) x 3 (CC) cm mass/replaced level 1 lymph node which is biopsy proven carcinoma.  Additional level 1 and level 2 masses/abnormal lymph nodes in conglomerate measuring approximately 4.2 (AP) x 7.1 (TR) which at least abut the pectoralis minor muscle.  Right supraclavicular 4 cm and 2.2 cm masses/abnormal lymph nodes and 1 cm right internal mammary lymph node.  4. Multiple enhancing sternal lesions concerning for metastatic disease, largest measuring approximately 1.5 cm.  5. Apparent kyphotic deformity of the mid thoracic vertebrae with retrolisthesis.  Recommend further evaluation with PET-CT and to assess for distant metastasis.     Pathology:  1. 12/30/2022 Ultrasound-guided Core Needle Biopsy Right Breast 6 o'clock 9 cm FN - Invasive carcinoma of no special type (ductal), grade 3   ER less than 1%  PA 0%  HER 2 Negative  Ki67 65%     2. 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 Mediport placement placed with  Dr. Shields.      Plan:       - PET scan 1/26/23 reveals  Diffuse FDG uptake throughout the right breast with overlying skin thickening, right axillary lymphadenopathy and uptake  in the right chest wall musculature.  Bilateral hypermetabolic cervical lymphadenopathy. Diffuse hypermetabolic osseous metastatic disease.    - MRI Brain w/wo 1/19/23-reveals brain mets, patient started on Dexamethasone, and referred to radiation oncology.    - Mediport placed      DISCUSSION:    1.  A total of 60 minutes were spent in counseling today, in which 100% were face-to-face.  At today's chemotherapy teaching session, we discussed the patient's cancer diagnosis as well as planned therapy regimen, protocol, side effects and toxicities.  A handout of each therapeutic agent in the regimen was provided and reviewed in detail.    2.  The following side effects were discussed but not limited to:                 a.  Discussed the risk of infection while on chemotherapy related to pancytopenia, specifically a decrease in their white blood cell count.  Instructed to contact our office for temperature >100.5 F, chills, sudden onset cough or shortness of breath, symptoms of a urinary tract infection.                b.  Discussed the risk of anemia. Instructed to contact our office for dizziness, heart palpitations, or extreme or sudden changes in weakness.                c.  Discussed the risk of thrombocytopenia, which increases the risk of bruising or bleeding.  Instructed the patient to contact our office for spontaneous signs of bleeding, including nose bleeds, bleeding from the gums or mouth, blood in sputum, urine or stool and unusual or excessive bruising or rash.                d.  Discussed GI side effects including weight changes, changes in appetite, altered sense of taste, stomatitis, nausea, vomiting, diarrhea, constipation, and heartburn.                e.  Discussed  side effects including painful urination, changes in the amount of urination, possible urine color changes.  Discussed fertility issues and to prevent  pregnancy if of child bearing age.                f.  Discussed neurological side  effects including the risk of peripheral neuropathy, either temporary or permanent.                g.  Discussed the potential for skin, hair, and nail changes.       3.  Instructed to contact our office for discussion of medication changes, the addition of vitamin and/or herbal supplementation as they may interact with some chemotherapy agents.    4.  Discussed dietary modifications and the need to maintain adequate caloric intake and proper oral hydration.  Recommended 64 ounces of fluid per day.    5.  Discussed anti-emetic protocol and bowel regimen protocol.    6.  Office contact information given including after hours number.  Discussed there is an oncologist on call 24/7, 365 days including weekends.  Provided primary nurse's information .    7.  In summary, the patient is in agreement with the plan of care.  Questions appeared to be answered to their satisfaction.  Chemotherapy consent was reviewed and signed.  To be copy scanned into the chart.

## 2023-02-06 NOTE — PROGRESS NOTES
Subjective:       Patient ID: Emse Marie is a 57 y.o. female.    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 She noticed R breast skin changes in  just prior to her already planned annual MMG. Her breast changes including skin changes, darkening, nipple retraction, and breast firmness has developed rapidly over the last month since diagnosis - made worse after her MMG and Breast MRI. She denies any fatigue, pain, chest pain, shortness of breath, headaches, changes to vision, or other complaints. Energy has been stable. Tolerating PO without issue.     Interval History  Patient presents for 1 week follow up after C1D1 of Carbo/Manitou. She tolerated treatment well. no nausea. No neuropathy. Having regular bowel movements.  Eating/drinking OK. [facility] office to contact patient when to return for start of treatment. No pain in breast; haven't taken pain med for pain in arm today. Started exercise for arm; seeing lymphedema PT today. Lack of sleep because of steroid in the PM so has only been taking 2 in AM.  Otherwise doing well    OB/GYN History:  Age at Menarche Onset: 14  Menopausal Status: postmenopausal, LMP: 2019  Hysterectomy/Oophorectomy: menopause, at age 54  Hormonal birth control (duration): Yes starting at age 17 used for 2 years.   Pregnancy History:   Age at first live birth: 16  Hormone Replacement Therapy: No, none  Patient did not breast feed      Past Medical History:   Diagnosis Date    Breast cancer     Hyperlipidemia     Knee pain     Menopausal flushing     Menopause 3/2018    Normocytic normochromic anemia     Vitamin D deficiency       Past Surgical History:   Procedure Laterality Date     PLACEMENT, MEDIPORT N/A 01/23/2023    Procedure: Placement, Mediport;  Surgeon: Silva Shields MD;  Location: [facility];  Service: General;  Laterality: N/A;    TUBAL LIGATION       Social History     Socioeconomic History    Marital status: Single   Occupational History    Occupation: Supply chain [employer]   Tobacco Use    Smoking status: Never    Smokeless tobacco: Never   Substance and Sexual Activity    Alcohol use: Yes     Comment: occassionally    Drug use: Never    Sexual activity: Yes     Partners: Male     Birth control/protection: None     Social Determinants of Health     Financial Resource Strain: Low Risk     Difficulty of Paying Living Expenses: Not hard at all   Food Insecurity: No Food Insecurity    Worried About Running Out of Food in the Last Year: Never true    Ran Out of Food in the Last Year: Never true   Transportation Needs: No Transportation Needs    Lack of Transportation (Medical): No    Lack of Transportation (Non-Medical): No   Physical Activity: Inactive    Days of Exercise per Week: 0 days    Minutes of Exercise per Session: 0 min   Stress: No Stress Concern Present    Feeling of Stress : Only a little   Social Connections: Moderately Isolated    Frequency of Communication with Friends and Family: More than three times a week    Frequency of Social Gatherings with Friends and Family: More than three times a week    Attends [employer] Services: 1 to 4 times per year    Active Member of Clubs or Organizations: No    Attends Club or Organization Meetings: Never    Marital Status: Never    Housing Stability: Unknown    Unable to Pay for Housing in the Last Year: No    Unstable Housing in the Last Year: No      Family History   Problem Relation Age of Onset    Hypertension Mother     Diabetes Mother     Arthritis Mother     Lung cancer Father 62      Review of patient's allergies indicates:  No Known Allergies   Review of Systems   Constitutional:  Negative for appetite change and  unexpected weight change.   HENT:  Negative for mouth sores.    Eyes:  Negative for visual disturbance.   Respiratory:  Negative for shortness of breath.    Cardiovascular:  Negative for chest pain.   Gastrointestinal:  Negative for abdominal pain and diarrhea.   Genitourinary:  Negative for frequency.   Musculoskeletal:  Positive for back pain.   Integumentary:  Negative for rash.   Neurological:  Negative for headaches.   Hematological:  Positive for adenopathy.   Psychiatric/Behavioral:  The patient is not nervous/anxious.        Objective:      Vitals:    02/06/23 0905   BP: (!) 140/85   Pulse: 84   Resp: 18   Temp: 98.1 °F (36.7 °C)         Physical Exam  Constitutional:       General: She is not in acute distress.     Appearance: Normal appearance. She is not ill-appearing.   HENT:      Head: Normocephalic and atraumatic.      Nose: Nose normal.      Mouth/Throat:      Mouth: Mucous membranes are moist.      Pharynx: Oropharynx is clear.   Eyes:      Extraocular Movements: Extraocular movements intact.      Conjunctiva/sclera: Conjunctivae normal.      Pupils: Pupils are equal, round, and reactive to light.   Cardiovascular:      Rate and Rhythm: Normal rate and regular rhythm.      Pulses: Normal pulses.      Heart sounds: Normal heart sounds. No murmur heard.  Pulmonary:      Effort: Pulmonary effort is normal. No respiratory distress.      Breath sounds: Normal breath sounds.   Abdominal:      General: Bowel sounds are normal. There is no distension.      Palpations: Abdomen is soft.   Musculoskeletal:         General: No swelling. Normal range of motion.      Cervical back: Normal range of motion and neck supple.   Lymphadenopathy:      Cervical: No cervical adenopathy.   Skin:     General: Skin is warm and dry.   Neurological:      General: No focal deficit present.      Mental Status: She is alert and oriented to person, place, and time.     Chest - Right breast with skin erythema, thickening, and  dimpling consistent with inflammatory changes throughout the entire breast. Breast is firm throughout. Nipple retraction. Right axillary and R supraclavicular LAD palpated. No abnormality noted in left breast or axilla.     LABS AND IMAGING REVIEWED IN EPIC    Imagin. 2022 BL DG MG/ R US BREAST LIMITED at [facility]- which revealed in R breast, a developing asymmetry extending from the retroareolar area to involve all quadrants of the right breast, anterior to posterior depth, measuring approximately 12 cm.  There is a 3.3 cm oval, obscured, equal density mass-like component in the lower central right breast, middle to posterior depth.  Triangle marker is noted on the upper outer right breast demarcating the site of clinical concern with underlying 1.7 cm focal asymmetry in the upper outer right breast, posterior depth.  There is diffuse right breast skin thickening most prominent in the periareolar breast.  There is right nipple retraction.  There is a 3.8 cm oval, high density mass/abnormal lymph node with indistinct margin in the right axilla.   On R US, there is a 1.5 x 0.8 x 1.7 cm irregular, non parallel, heterogeneous dermal-subdermal mass with indistinct margin, minimal internal vascularity, and some posterior shadowing in the upper outer right breast at 10 o'clock 12 cm FN,  the site of patient indicated palpable lump, correlating with the mammographic focal asymmetry.  There is associated skin thickening of approximately 0.4 cm.  There is adjacent heterogeneous, echogenic signal within the subdermal fat measuring approximately 2.4 x 1.2 x 2.1 cm.   There is a 2.2 x 1.7 x 3.3 cm irregular, non parallel, hypoechoic mass with angular and indistinct margin, peripheral vascularity, and some posterior enhancement in the lower central right breast at 6 o'clock 9 cm FN, correlating with the mass-like mammographic finding.  There is skin thickening measuring approximately 0.8 cm and edema in the retroareolar  right breast with loss of the normal soft tissue planes.  There is associated hyperemia.   There is a 5.3 x 3.9 x 5.5 cm irregular, non parallel, hypoechoic mass with angular and indistinct margins, peripheral vascularity, and posterior shadowing in the upper outer right breast at 10 o'clock 8 cm FN..     There is a 3.6 x 2.1 x 3.8 cm oval, hypoechoic mass/abnormal lymph node with indistinct margin, minimal internal vascularity, and posterior enhancement in the right axilla at 10 o'clock 14 cm FN. BIRADS-4 suspicious; biopsy recommended.       2. 2023 US SOFT TISSUE HEAD NECK AND THYROID at [facility]- which revealed at area of palpable complaint corresponds with a heterogeneous mixed echogenicity 1.1 x 1 x 0.6 cm lesion with internal color Doppler signal.  Deeper to this there is a 2nd, larger 3.5 x 2.7 x 2.1 cm mixed cystic and solid lesion in the right supraclavicular space.  Cystic components may be related to necrosis or suppurative change.     MRI Breast 23  Findings consistent with advanced stage, aggressive right breast inflammatory carcinoma (particularly given rapid clinical onset) includin. Extensive right breast disease involving the right nipple (with retraction) and all quadrants with mass and non-mass enhancement measuring approximately 17.3 (AP) x 8.3 (TR) x 12 (CC) cm.  There is diffuse right breast skin thickening with dermal-subdermal enhancement along the lower inner and lower central right breast, anterior to posterior depth, and upper outer right breast, middle to posterior depth, likely correlating with the site of palpable lump.  2. Abnormal signal extending posteriorly to involve the right pectoralis major muscle (with asymmetric tenting) and inferiorly and laterally to involve the soft tissue overlying the right chest/upper abdominal wall at the level of the liver concerning for disease progression.    3. Right axillary 4.2 (AP) x 3 (TR) x 3 (CC) cm mass/replaced level 1 lymph node  which is biopsy proven carcinoma.  Additional level 1 and level 2 masses/abnormal lymph nodes in conglomerate measuring approximately 4.2 (AP) x 7.1 (TR) which at least abut the pectoralis minor muscle.  Right supraclavicular 4 cm and 2.2 cm masses/abnormal lymph nodes and 1 cm right internal mammary lymph node.  4. Multiple enhancing sternal lesions concerning for metastatic disease, largest measuring approximately 1.5 cm.  5. Apparent kyphotic deformity of the mid thoracic vertebrae with retrolisthesis.  Recommend further evaluation with PET-CT and to assess for distant metastasis.    MRI Brain 1/19/23  Impression:  1. Multiple enhancing nodules in the posterior fossa with mild surrounding edema, concerning for metastatic disease.  2. Enhancing bone marrow lesions in the occipital bones, concerning for osseous metastases.    1/26/23 PET   Impression:  1. Diffuse FDG uptake throughout the right breast with overlying skin thickening, right axillary lymphadenopathy and uptake in the right chest wall musculature.  2. Bilateral hypermetabolic cervical lymphadenopathy.  3. Diffuse hypermetabolic osseous metastatic disease.    Pathology:  1. 12/30/2022 Ultrasound-guided Core Needle Biopsy Right Breast 6 o'clock 9 cm FN - Invasive carcinoma of no special type (ductal), grade 3   ER less than 1%  MD 0%  HER 2 Negative  Ki67 65%     2. 12/30/2022 Ultrasound-guided Core Needle Biopsy Right Axillary Mass/ Abnormal Lymph Node 10 o'clock 14 cm FN - Invasive carcinoma of no special type (ductal); no lymph node architecture identified        Assessment:   Stage IV Metastatic Triple Negative Inflammatory Breast Cancer - Diagnosed December 2022. Mets to brain and bone.   - Plan for Carboplatin/Gemcitabine Day 1, 8 Q21 for initial chemotherapy   - Will order prolia given bone mets  - Radiation Oncology following for brain mets  - Dr. Shields placed mediport on 1/23/23  - Genetics - Pending        Plan:       - Proceed with C1D8 of  Carbo/Gemcitabine   - Rx [place] 5mg #90 sent to pharmacy 1/30/23  - waiting on prolia for insurance approval given extensive bone metastasis  - RTC 2/22 for MD visit, labs, treatment same day due to distance and mardi gras holiday  - Will plan for repeat PET in 3 months, beginning of May 2023    Elizabeth Kennedy LeJeune, MD  Hematology/Oncology   Can[facility] Center [facility]

## 2023-02-07 NOTE — PLAN OF CARE
Day 8 Cycle 5 Gemzar.Tolerated treatment without adverse events. Discharged in stable condition.

## 2023-02-07 NOTE — PROGRESS NOTES
Nutrition Education    Education Provided: antimicrobial/neutropenic  Teaching Method: explanation and printed materials  Comprehension: verbalizes understanding  Barriers to Learning: none evident  Expected Compliance: good  Comments: All questions were answered and dietitian's contact information was provided.      Provided Eating Hints for Cancer Treatment book and RD contact info.

## 2023-02-10 NOTE — TELEPHONE ENCOUNTER
Pt called with questions about her leave paperwork and her med schedule for chemo.     Re: leave paperwork, I [facility] this [facility]. Notified pt that I received a confirmation sheet and I put the confirmation sheet with her paperwork at the  for pickup when she comes back to clinic.     Re: meds, pt reports n/v past couple days. She had chemo on 2/7 and brain XRT the following day with Dr. Elliott. She reports Dr. Elliott wants her to wean from her daily steroid but there is some confusion with this and other meds. Reviewed her med list with her and discussed proper way to take steroids and antiemetics. Wrote down meds necessary for chemotherapy cycles going forward (steroid on days 2-4 and olanzapine days 1-4). Pt did not take olanzapine at all this week and other antiemetics have been taken intermittently. All discussed with Dr. Lejeune and MCKAYLA Infante. Advised pt to call clinic back if she has questions or concerns.

## 2023-02-16 PROBLEM — R11.0 CHEMOTHERAPY-INDUCED NAUSEA: Status: ACTIVE | Noted: 2023-01-01

## 2023-02-16 PROBLEM — T45.1X5A CHEMOTHERAPY-INDUCED NAUSEA: Status: ACTIVE | Noted: 2023-01-01

## 2023-02-16 PROBLEM — Z51.11 ENCOUNTER FOR CHEMOTHERAPY MANAGEMENT: Status: ACTIVE | Noted: 2023-01-01

## 2023-02-16 NOTE — PROGRESS NOTES
Subjective:       Patient ID: Esme Marie is a 57 y.o. female.    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 She noticed R breast skin changes in December '22 just prior to her already planned annual MMG. Her breast changes including skin changes, darkening, nipple retraction, and breast firmness has developed rapidly over the last month since diagnosis - made worse after her MMG and Breast MRI. She was found to have extensive R breast and axilla abnormality with breast imaging with MMG/US and Breast MRI. 1/26/23 staing PET with evidence of Diffuse FDG uptake throughout the right breast with overlying skin thickening, right axillary lymphadenopathy and uptake in the right chest wall musculature.Bilateral hypermetabolic cervical lymphadenopathy.Diffuse hypermetabolic osseous metastatic disease. She then underwent Brain MRI which showed evidence of metastatic disease.   Since that time she has begun chemotherapy for her metastatic Triple negative breast cancer. She began Carboplatin + Gemcitabine in February 2023. She also is undergoing brain radiation for her known brain mets as well as starting zometa for her osseous disease.     Interval History  Patient presents for 1 week follow up after C1D8 of Carbo/Webb. Last week after chemotherapy she had a tough time with nausea due to some confusion over which steroids to wean off and which to continue. Once she cleared up her medications and took her prescribed antiemetics she began to feel much better. She has already noticed a dramatic decrease in size to her R arm, changes to her breast, and decrease in pain. She is no longer requiring pain medicines and she feels  ""great."      THIS VISIT WAS DONE USING BOTH AUDIO AND VISUAL AIDS FOR THIS TELEHEALTH VISIT    OB/GYN History:  Age at Menarche Onset: 14  Menopausal Status: postmenopausal, LMP: 2019  Hysterectomy/Oophorectomy: menopause, at age 54  Hormonal birth control (duration): Yes starting at age 17 used for 2 years.   Pregnancy History:   Age at first live birth: 16  Hormone Replacement Therapy: No, none  Patient did not breast feed      Past Medical History:   Diagnosis Date    Breast cancer     Hyperlipidemia     Knee pain     Menopausal flushing     Menopause 3/2018    Normocytic normochromic anemia     Vitamin D deficiency       Past Surgical History:   Procedure Laterality Date    PLACEMENT, MEDIPORT N/A 2023    Procedure: Placement, Mediport;  Surgeon: Silva Shields MD;  Location: [facility];  Service: General;  Laterality: N/A;    TUBAL LIGATION       Social History     Socioeconomic History    Marital status: Single   Occupational History    Occupation: [employer]   Tobacco Use    Smoking status: Never    Smokeless tobacco: Never   Substance and Sexual Activity    Alcohol use: Yes     Comment: occassionally    Drug use: Never    Sexual activity: Yes     Partners: Male     Birth control/protection: None     Social Determinants of Health     Financial Resource Strain: Low Risk     Difficulty of Paying Living Expenses: Not hard at all   Food Insecurity: No Food Insecurity    Worried About Running Out of Food in the Last Year: Never true    Ran Out of Food in the Last Year: Never true   Transportation Needs: No Transportation Needs    Lack of Transportation (Medical): No    Lack of Transportation (Non-Medical): No   Physical Activity: Inactive    Days of Exercise per Week: 0 days    Minutes of Exercise per Session: 0 min   Stress: No Stress Concern Present    Feeling of Stress : Only a little   Social Connections: Moderately Isolated    Frequency of Communication with Friends and Family: More " than three times a week    Frequency of Social Gatherings with Friends and Family: More than three times a week    Attends Shinto Services: 1 to 4 times per year    Active Member of Clubs or Organizations: No    Attends Club or Organization Meetings: Never    Marital Status: Never    Housing Stability: Unknown    Unable to Pay for Housing in the Last Year: No    Unstable Housing in the Last Year: No      Family History   Problem Relation Age of Onset    Hypertension Mother     Diabetes Mother     Arthritis Mother     Lung cancer Father 62      Review of patient's allergies indicates:  No Known Allergies   Review of Systems   Constitutional:  Negative for appetite change and unexpected weight change.   HENT:  Negative for mouth sores.    Eyes:  Negative for visual disturbance.   Respiratory:  Negative for cough and shortness of breath.    Cardiovascular:  Negative for chest pain.   Gastrointestinal:  Negative for abdominal pain and diarrhea.   Genitourinary:  Negative for frequency.   Musculoskeletal:  Positive for back pain.   Integumentary:  Negative for rash.   Neurological:  Negative for headaches.   Hematological:  Positive for adenopathy.   Psychiatric/Behavioral:  The patient is not nervous/anxious.        Objective:      There were no vitals filed for this visit.        Physical Exam  Constitutional:       General: She is not in acute distress.     Appearance: Normal appearance. She is not ill-appearing.   HENT:      Head: Normocephalic and atraumatic.      Nose: Nose normal.      Mouth/Throat:      Mouth: Mucous membranes are moist.      Pharynx: Oropharynx is clear.   Eyes:      Extraocular Movements: Extraocular movements intact.      Conjunctiva/sclera: Conjunctivae normal.   Pulmonary:      Effort: Pulmonary effort is normal. No respiratory distress.   Musculoskeletal:         General: No swelling.      Right lower leg: No edema.      Left lower leg: No edema.   Skin:     General: Skin is dry.       Coloration: Skin is not jaundiced.   Neurological:      General: No focal deficit present.      Mental Status: She is alert and oriented to person, place, and time. Mental status is at baseline.     Chest - Right breast with skin erythema, thickening, and dimpling consistent with inflammatory changes throughout the entire breast. Breast is firm throughout. Nipple retraction. Right axillary and R supraclavicular LAD palpated. No abnormality noted in left breast or axilla.     LABS AND IMAGING REVIEWED IN EPIC    Imagin. 2022 BL DG MG/ R US BREAST LIMITED at [facility]- which revealed in R breast, a developing asymmetry extending from the retroareolar area to involve all quadrants of the right breast, anterior to posterior depth, measuring approximately 12 cm.  There is a 3.3 cm oval, obscured, equal density mass-like component in the lower central right breast, middle to posterior depth.  Triangle marker is noted on the upper outer right breast demarcating the site of clinical concern with underlying 1.7 cm focal asymmetry in the upper outer right breast, posterior depth.  There is diffuse right breast skin thickening most prominent in the periareolar breast.  There is right nipple retraction.  There is a 3.8 cm oval, high density mass/abnormal lymph node with indistinct margin in the right axilla.   On R US, there is a 1.5 x 0.8 x 1.7 cm irregular, non parallel, heterogeneous dermal-subdermal mass with indistinct margin, minimal internal vascularity, and some posterior shadowing in the upper outer right breast at 10 o'clock 12 cm FN,  the site of patient indicated palpable lump, correlating with the mammographic focal asymmetry.  There is associated skin thickening of approximately 0.4 cm.  There is adjacent heterogeneous, echogenic signal within the subdermal fat measuring approximately 2.4 x 1.2 x 2.1 cm.   There is a 2.2 x 1.7 x 3.3 cm irregular, non parallel, hypoechoic mass with angular and indistinct  margin, peripheral vascularity, and some posterior enhancement in the lower central right breast at 6 o'clock 9 cm FN, correlating with the mass-like mammographic finding.  There is skin thickening measuring approximately 0.8 cm and edema in the retroareolar right breast with loss of the normal soft tissue planes.  There is associated hyperemia.   There is a 5.3 x 3.9 x 5.5 cm irregular, non parallel, hypoechoic mass with angular and indistinct margins, peripheral vascularity, and posterior shadowing in the upper outer right breast at 10 o'clock 8 cm FN..     There is a 3.6 x 2.1 x 3.8 cm oval, hypoechoic mass/abnormal lymph node with indistinct margin, minimal internal vascularity, and posterior enhancement in the right axilla at 10 o'clock 14 cm FN. BIRADS-4 suspicious; biopsy recommended.       2. 2023 US SOFT TISSUE HEAD NECK AND THYROID at [facility]- which revealed at area of palpable complaint corresponds with a heterogeneous mixed echogenicity 1.1 x 1 x 0.6 cm lesion with internal color Doppler signal.  Deeper to this there is a 2nd, larger 3.5 x 2.7 x 2.1 cm mixed cystic and solid lesion in the right supraclavicular space.  Cystic components may be related to necrosis or suppurative change.     MRI Breast 23  Findings consistent with advanced stage, aggressive right breast inflammatory carcinoma (particularly given rapid clinical onset) includin. Extensive right breast disease involving the right nipple (with retraction) and all quadrants with mass and non-mass enhancement measuring approximately 17.3 (AP) x 8.3 (TR) x 12 (CC) cm.  There is diffuse right breast skin thickening with dermal-subdermal enhancement along the lower inner and lower central right breast, anterior to posterior depth, and upper outer right breast, middle to posterior depth, likely correlating with the site of palpable lump.  2. Abnormal signal extending posteriorly to involve the right pectoralis major muscle (with asymmetric  tenting) and inferiorly and laterally to involve the soft tissue overlying the right chest/upper abdominal wall at the level of the liver concerning for disease progression.    3. Right axillary 4.2 (AP) x 3 (TR) x 3 (CC) cm mass/replaced level 1 lymph node which is biopsy proven carcinoma.  Additional level 1 and level 2 masses/abnormal lymph nodes in conglomerate measuring approximately 4.2 (AP) x 7.1 (TR) which at least abut the pectoralis minor muscle.  Right supraclavicular 4 cm and 2.2 cm masses/abnormal lymph nodes and 1 cm right internal mammary lymph node.  4. Multiple enhancing sternal lesions concerning for metastatic disease, largest measuring approximately 1.5 cm.  5. Apparent kyphotic deformity of the mid thoracic vertebrae with retrolisthesis.  Recommend further evaluation with PET-CT and to assess for distant metastasis.    MRI Brain 1/19/23  Impression:  1. Multiple enhancing nodules in the posterior fossa with mild surrounding edema, concerning for metastatic disease.  2. Enhancing bone marrow lesions in the occipital bones, concerning for osseous metastases.    1/26/23 PET   Impression:  1. Diffuse FDG uptake throughout the right breast with overlying skin thickening, right axillary lymphadenopathy and uptake in the right chest wall musculature.  2. Bilateral hypermetabolic cervical lymphadenopathy.  3. Diffuse hypermetabolic osseous metastatic disease.    Pathology:  1. 12/30/2022 Ultrasound-guided Core Needle Biopsy Right Breast 6 o'clock 9 cm FN - Invasive carcinoma of no special type (ductal), grade 3   ER less than 1%  NE 0%  HER 2 Negative  Ki67 65%     2. 12/30/2022 Ultrasound-guided Core Needle Biopsy Right Axillary Mass/ Abnormal Lymph Node 10 o'clock 14 cm FN - Invasive carcinoma of no special type (ductal); no lymph node architecture identified        Assessment:   Stage IV Metastatic Triple Negative Inflammatory Breast Cancer - Diagnosed December 2022. Mets to brain and bone.   - Plan  for Carboplatin/Gemcitabine Day 1, 8 Q21 for initial chemotherapy   - Zometa for bone mets  - Radiation Oncology following for brain mets  - Dr. Shields placed mediport on 1/23/23  - Genetics - Pending        Plan:       - RTC 2/22 for MD visit, labs, and treatment all same day due to holiday  - May need to add zyprexa for nausea with next cycle  - Zometa for bone mets - need to start with C2D1  - Continue with radiation for brain mets    THIS VISIT WAS DONE USING BOTH AUDIO AND VISUAL AIDS FOR THIS TELEHEALTH VISIT    Greater than 40 minutes was used for this visit. This includes face to face time and non-face to face time preparing to see the patient (eg, review of tests), obtaining and/or reviewing separately obtained history, documenting clinical information in the electronic or other health record, independently interpreting results and communicating results to the patient/family/caregiver, or care coordinator.        Elizabeth Kennedy LeJeune, MD  Hematology/Oncology   [facility]

## 2023-02-22 NOTE — PROGRESS NOTES
Subjective:       Patient ID: Esme Marie is a 57 y.o. female.    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 She noticed R breast skin changes in December '22 just prior to her already planned annual MMG. Her breast changes including skin changes, darkening, nipple retraction, and breast firmness has developed rapidly over the last month since diagnosis - made worse after her MMG and Breast MRI. She was found to have extensive R breast and axilla abnormality with breast imaging with MMG/US and Breast MRI. 1/26/23 staing PET with evidence of Diffuse FDG uptake throughout the right breast with overlying skin thickening, right axillary lymphadenopathy and uptake in the right chest wall musculature.Bilateral hypermetabolic cervical lymphadenopathy.Diffuse hypermetabolic osseous metastatic disease. She then underwent Brain MRI which showed evidence of metastatic disease.   Since that time she has begun chemotherapy for her metastatic Triple negative breast cancer. She began Carboplatin + Gemcitabine in February 2023. She underwent brain radiation for her known brain mets in February '23.  Will be starting zometa for her osseous disease in March '23.     Interval History  Patient presents for 2 week follow up after C1D8 of Carbo/Tiskilwa. She had some confusion with steroid pills but this has been straightened out and she is feeling okay now. Occasional constipation. Eating okay. Energy level okay. Pain to R shoulder and breast has improved but is still present. Denies any neuropathy, chest pain, shortness of breath, nausea/vomiting.     OB/GYN History:  Age at Menarche Onset: 14  Menopausal Status: postmenopausal, LMP:  2019  Hysterectomy/Oophorectomy: menopause, at age 54  Hormonal birth control (duration): Yes starting at age 17 used for 2 years.   Pregnancy History:   Age at first live birth: 16  Hormone Replacement Therapy: No, none  Patient did not breast feed      Past Medical History:   Diagnosis Date    Breast cancer     Hyperlipidemia     Knee pain     Menopausal flushing     Menopause 3/2018    Normocytic normochromic anemia     Vitamin D deficiency       Past Surgical History:   Procedure Laterality Date    PLACEMENT, MEDIPORT N/A 2023    Procedure: Placement, Mediport;  Surgeon: Silva Shields MD;  Location: [facility];  Service: General;  Laterality: N/A;    TUBAL LIGATION       Social History     Socioeconomic History    Marital status: Single   Occupational History    Occupation: [employer]   Tobacco Use    Smoking status: Never    Smokeless tobacco: Never   Substance and Sexual Activity    Alcohol use: Yes     Comment: occassionally    Drug use: Never    Sexual activity: Yes     Partners: Male     Birth control/protection: None     Social Determinants of Health     Financial Resource Strain: Low Risk     Difficulty of Paying Living Expenses: Not hard at all   Food Insecurity: No Food Insecurity    Worried About Running Out of Food in the Last Year: Never true    Ran Out of Food in the Last Year: Never true   Transportation Needs: No Transportation Needs    Lack of Transportation (Medical): No    Lack of Transportation (Non-Medical): No   Physical Activity: Inactive    Days of Exercise per Week: 0 days    Minutes of Exercise per Session: 0 min   Stress: No Stress Concern Present    Feeling of Stress : Only a little   Social Connections: Moderately Isolated    Frequency of Communication with Friends and Family: More than three times a week    Frequency of Social Gatherings with Friends and Family: More than three times a week    Attends [employer]ae[employer] Services: 1 to 4 times per year    Active Member  of Clubs or Organizations: No    Attends Club or Organization Meetings: Never    Marital Status: Never    Housing Stability: Unknown    Unable to Pay for Housing in the Last Year: No    Unstable Housing in the Last Year: No      Family History   Problem Relation Age of Onset    Hypertension Mother     Diabetes Mother     Arthritis Mother     Lung cancer Father 62      Review of patient's allergies indicates:  No Known Allergies   Review of Systems   Constitutional:  Negative for appetite change and unexpected weight change.   HENT:  Negative for mouth sores.    Eyes:  Negative for visual disturbance.   Respiratory:  Negative for cough and shortness of breath.    Cardiovascular:  Negative for chest pain.   Gastrointestinal:  Negative for abdominal pain and diarrhea.   Genitourinary:  Negative for frequency.   Musculoskeletal:  Positive for back pain.   Integumentary:  Negative for rash.   Neurological:  Negative for headaches.   Hematological:  Positive for adenopathy.   Psychiatric/Behavioral:  The patient is not nervous/anxious.        Objective:      Vitals:    02/22/23 0930   BP: 112/73   Pulse: 104   Resp: 18   Temp: 97.7 °F (36.5 °C)       Physical Exam  Constitutional:       General: She is not in acute distress.     Appearance: Normal appearance. She is not ill-appearing.   HENT:      Head: Normocephalic and atraumatic.      Nose: Nose normal.      Mouth/Throat:      Mouth: Mucous membranes are moist.      Pharynx: Oropharynx is clear.   Eyes:      Extraocular Movements: Extraocular movements intact.      Conjunctiva/sclera: Conjunctivae normal.   Pulmonary:      Effort: Pulmonary effort is normal. No respiratory distress.   Musculoskeletal:         General: No swelling.      Right lower leg: No edema.      Left lower leg: No edema.   Skin:     General: Skin is dry.      Coloration: Skin is not jaundiced.   Neurological:      General: No focal deficit present.      Mental Status: She is alert and  oriented to person, place, and time. Mental status is at baseline.     Chest - Right breast with skin erythema, thickening, and dimpling consistent with inflammatory changes throughout the entire breast. Breast is firm throughout. Nipple retraction. Right axillary and R supraclavicular LAD palpated. No abnormality noted in left breast or axilla.     LABS AND IMAGING REVIEWED IN EPIC    Imagin. 2022 BL DG MG/ R US BREAST LIMITED at [facility]- which revealed in R breast, a developing asymmetry extending from the retroareolar area to involve all quadrants of the right breast, anterior to posterior depth, measuring approximately 12 cm.  There is a 3.3 cm oval, obscured, equal density mass-like component in the lower central right breast, middle to posterior depth.  Triangle marker is noted on the upper outer right breast demarcating the site of clinical concern with underlying 1.7 cm focal asymmetry in the upper outer right breast, posterior depth.  There is diffuse right breast skin thickening most prominent in the periareolar breast.  There is right nipple retraction.  There is a 3.8 cm oval, high density mass/abnormal lymph node with indistinct margin in the right axilla.   On R US, there is a 1.5 x 0.8 x 1.7 cm irregular, non parallel, heterogeneous dermal-subdermal mass with indistinct margin, minimal internal vascularity, and some posterior shadowing in the upper outer right breast at 10 o'clock 12 cm FN,  the site of patient indicated palpable lump, correlating with the mammographic focal asymmetry.  There is associated skin thickening of approximately 0.4 cm.  There is adjacent heterogeneous, echogenic signal within the subdermal fat measuring approximately 2.4 x 1.2 x 2.1 cm.   There is a 2.2 x 1.7 x 3.3 cm irregular, non parallel, hypoechoic mass with angular and indistinct margin, peripheral vascularity, and some posterior enhancement in the lower central right breast at 6 o'clock 9 cm FN, correlating  with the mass-like mammographic finding.  There is skin thickening measuring approximately 0.8 cm and edema in the retroareolar right breast with loss of the normal soft tissue planes.  There is associated hyperemia.   There is a 5.3 x 3.9 x 5.5 cm irregular, non parallel, hypoechoic mass with angular and indistinct margins, peripheral vascularity, and posterior shadowing in the upper outer right breast at 10 o'clock 8 cm FN..     There is a 3.6 x 2.1 x 3.8 cm oval, hypoechoic mass/abnormal lymph node with indistinct margin, minimal internal vascularity, and posterior enhancement in the right axilla at 10 o'clock 14 cm FN. BIRADS-4 suspicious; biopsy recommended.       2. 2023 US SOFT TISSUE HEAD NECK AND THYROID at [facility]- which revealed at area of palpable complaint corresponds with a heterogeneous mixed echogenicity 1.1 x 1 x 0.6 cm lesion with internal color Doppler signal.  Deeper to this there is a 2nd, larger 3.5 x 2.7 x 2.1 cm mixed cystic and solid lesion in the right supraclavicular space.  Cystic components may be related to necrosis or suppurative change.     MRI Breast 23  Findings consistent with advanced stage, aggressive right breast inflammatory carcinoma (particularly given rapid clinical onset) includin. Extensive right breast disease involving the right nipple (with retraction) and all quadrants with mass and non-mass enhancement measuring approximately 17.3 (AP) x 8.3 (TR) x 12 (CC) cm.  There is diffuse right breast skin thickening with dermal-subdermal enhancement along the lower inner and lower central right breast, anterior to posterior depth, and upper outer right breast, middle to posterior depth, likely correlating with the site of palpable lump.  2. Abnormal signal extending posteriorly to involve the right pectoralis major muscle (with asymmetric tenting) and inferiorly and laterally to involve the soft tissue overlying the right chest/upper abdominal wall at the level of  the liver concerning for disease progression.    3. Right axillary 4.2 (AP) x 3 (TR) x 3 (CC) cm mass/replaced level 1 lymph node which is biopsy proven carcinoma.  Additional level 1 and level 2 masses/abnormal lymph nodes in conglomerate measuring approximately 4.2 (AP) x 7.1 (TR) which at least abut the pectoralis minor muscle.  Right supraclavicular 4 cm and 2.2 cm masses/abnormal lymph nodes and 1 cm right internal mammary lymph node.  4. Multiple enhancing sternal lesions concerning for metastatic disease, largest measuring approximately 1.5 cm.  5. Apparent kyphotic deformity of the mid thoracic vertebrae with retrolisthesis.  Recommend further evaluation with PET-CT and to assess for distant metastasis.    MRI Brain 1/19/23  Impression:  1. Multiple enhancing nodules in the posterior fossa with mild surrounding edema, concerning for metastatic disease.  2. Enhancing bone marrow lesions in the occipital bones, concerning for osseous metastases.    1/26/23 PET   Impression:  1. Diffuse FDG uptake throughout the right breast with overlying skin thickening, right axillary lymphadenopathy and uptake in the right chest wall musculature.  2. Bilateral hypermetabolic cervical lymphadenopathy.  3. Diffuse hypermetabolic osseous metastatic disease.    Pathology:  1. 12/30/2022 Ultrasound-guided Core Needle Biopsy Right Breast 6 o'clock 9 cm FN - Invasive carcinoma of no special type (ductal), grade 3   ER less than 1%  DE 0%  HER 2 Negative  Ki67 65%     2. 12/30/2022 Ultrasound-guided Core Needle Biopsy Right Axillary Mass/ Abnormal Lymph Node 10 o'clock 14 cm FN - Invasive carcinoma of no special type (ductal); no lymph node architecture identified    Assessment:   Stage IV Metastatic Triple Negative Inflammatory Breast Cancer - Diagnosed December 2022. Mets to brain and bone.   - Plan for Carboplatin/Gemcitabine Day 1, 8 Q 21 for initial chemotherapy   - Zometa for bone mets - Start March '23  - Radiation Oncology  following for brain mets - Completed February '23  - Dr. Shields placed mediport on 1/23/23  - Genetics - Pending        Plan:       - Proceed with C2D1 today  - Zometa for bone mets - need to start with C2D8  - RTC 1 week for MD visit, labs, and treatment all same day due to travel distance from home  - May need zyprexa over the next week pending nausea     Elizabeth Kennedy LeJeune, MD  Hematology/Oncology   [facility]

## 2023-02-27 NOTE — PROGRESS NOTES
Subjective:       Patient ID: Esme Marie is a 57 y.o. female.    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 She noticed R breast skin changes in December '22 just prior to her already planned annual MMG. Her breast changes including skin changes, darkening, nipple retraction, and breast firmness has developed rapidly over the last month since diagnosis - made worse after her MMG and Breast MRI. She was found to have extensive R breast and axilla abnormality with breast imaging with MMG/US and Breast MRI. 1/26/23 staing PET with evidence of Diffuse FDG uptake throughout the right breast with overlying skin thickening, right axillary lymphadenopathy and uptake in the right chest wall musculature.Bilateral hypermetabolic cervical lymphadenopathy.Diffuse hypermetabolic osseous metastatic disease. She then underwent Brain MRI which showed evidence of metastatic disease.   Since that time she has begun chemotherapy for her metastatic Triple negative breast cancer. She began Carboplatin + Gemcitabine in February 2023. She underwent brain radiation for her known brain mets in February '23.  Will be starting zometa for her osseous disease in March '23.     Interval History  Ms. Marie is here today for an on treatment follow-up visit.  She is due for C2D8 Cabo/Boyle.  Overall, treatment has been well tolerated.  She had more nausea with the last treatment but did not take her antiemetics as directed.  She has a schedule written down.  She is participating in physical therapy for her right upper extremity lymphedema and decreased range of motion and does note improvement.  She does have a small area of skin breakdown involving  the right breast just below the nipple.  We discussed skin care today. Laboratory in the office today does show progressive cytopenias with WBC 2.7 (ANC 13 50) and hemoglobin of 8.8.  She does admit to fatigue and cold intolerance.  Laboratory results discussed with patient.    OB/GYN History:  Age at Menarche Onset: 14  Menopausal Status: postmenopausal, LMP: 2019  Hysterectomy/Oophorectomy: menopause, at age 54  Hormonal birth control (duration): Yes starting at age 17 used for 2 years.   Pregnancy History:   Age at first live birth: 16  Hormone Replacement Therapy: No, none  Patient did not breast feed      Past Medical History:   Diagnosis Date    Breast cancer     Hyperlipidemia     Knee pain     Menopausal flushing     Menopause 3/2018    Normocytic normochromic anemia     Vitamin D deficiency       Past Surgical History:   Procedure Laterality Date    PLACEMENT, MEDIPORT N/A 2023    Procedure: Placement, Mediport;  Surgeon: Silva Shields MD;  Location: [facility];  Service: General;  Laterality: N/A;    TUBAL LIGATION       Social History     Socioeconomic History    Marital status: Single   Occupational History    Occupation: [employer]   Tobacco Use    Smoking status: Never    Smokeless tobacco: Never   Substance and Sexual Activity    Alcohol use: Yes     Comment: occassionally    Drug use: Never    Sexual activity: Yes     Partners: Male     Birth control/protection: None     Social Determinants of Health     Financial Resource Strain: Low Risk     Difficulty of Paying Living Expenses: Not hard at all   Food Insecurity: No Food Insecurity    Worried About Running Out of Food in the Last Year: Never true    Ran Out of Food in the Last Year: Never true   Transportation Needs: No Transportation Needs    Lack of Transportation (Medical): No    Lack of Transportation (Non-Medical): No   Physical Activity: Inactive    Days of Exercise per Week: 0 days    Minutes of Exercise per Session:  0 min   Stress: No Stress Concern Present    Feeling of Stress : Only a little   Social Connections: Moderately Isolated    Frequency of Communication with Friends and Family: More than three times a week    Frequency of Social Gatherings with Friends and Family: More than three times a week    Attends Voodoo Services: 1 to 4 times per year    Active Member of Clubs or Organizations: No    Attends Club or Organization Meetings: Never    Marital Status: Never    Housing Stability: Unknown    Unable to Pay for Housing in the Last Year: No    Unstable Housing in the Last Year: No      Family History   Problem Relation Age of Onset    Hypertension Mother     Diabetes Mother     Arthritis Mother     Lung cancer Father 62      Review of patient's allergies indicates:  No Known Allergies   Review of Systems   Constitutional:  Negative for appetite change and unexpected weight change.   HENT:  Negative for mouth sores.    Eyes:  Negative for visual disturbance.   Respiratory:  Negative for cough and shortness of breath.    Cardiovascular:  Negative for chest pain.   Gastrointestinal:  Negative for abdominal pain and diarrhea.   Genitourinary:  Negative for frequency.   Musculoskeletal:  Positive for back pain.   Integumentary:  Negative for rash.   Neurological:  Negative for headaches.   Hematological:  Positive for adenopathy.   Psychiatric/Behavioral:  The patient is not nervous/anxious.        Objective:      Vitals:    03/01/23 0836   BP: 114/84   Pulse: 80   Resp: 18   Temp: 98 °F (36.7 °C)         Physical Exam  Constitutional:       General: She is not in acute distress.     Appearance: Normal appearance. She is not ill-appearing.   HENT:      Head: Normocephalic and atraumatic.      Nose: Nose normal.      Mouth/Throat:      Mouth: Mucous membranes are moist.      Pharynx: Oropharynx is clear.   Eyes:      Extraocular Movements: Extraocular movements intact.      Conjunctiva/sclera: Conjunctivae normal.    Pulmonary:      Effort: Pulmonary effort is normal. No respiratory distress.   Musculoskeletal:         General: No swelling.      Right lower leg: No edema.      Left lower leg: No edema.   Lymphadenopathy:      Comments: RUE lymphedema   Skin:     General: Skin is dry.      Coloration: Skin is not jaundiced.   Neurological:      General: No focal deficit present.      Mental Status: She is alert and oriented to person, place, and time. Mental status is at baseline.     Chest - Right breast with skin erythema, thickening, and dimpling consistent with inflammatory changes throughout the entire breast. Breast is firm throughout. Nipple retraction. Small area of skin breakdown below nipple.  Right axillary and R supraclavicular LAD palpated. No abnormality noted in left breast or axilla.     LABS AND IMAGING REVIEWED IN EPIC    Imagin. 2022 BL DG MG/ R US BREAST LIMITED at [facility]- which revealed in R breast, a developing asymmetry extending from the retroareolar area to involve all quadrants of the right breast, anterior to posterior depth, measuring approximately 12 cm.  There is a 3.3 cm oval, obscured, equal density mass-like component in the lower central right breast, middle to posterior depth.  Triangle marker is noted on the upper outer right breast demarcating the site of clinical concern with underlying 1.7 cm focal asymmetry in the upper outer right breast, posterior depth.  There is diffuse right breast skin thickening most prominent in the periareolar breast.  There is right nipple retraction.  There is a 3.8 cm oval, high density mass/abnormal lymph node with indistinct margin in the right axilla.   On R US, there is a 1.5 x 0.8 x 1.7 cm irregular, non parallel, heterogeneous dermal-subdermal mass with indistinct margin, minimal internal vascularity, and some posterior shadowing in the upper outer right breast at 10 o'clock 12 cm FN,  the site of patient indicated palpable lump, correlating  with the mammographic focal asymmetry.  There is associated skin thickening of approximately 0.4 cm.  There is adjacent heterogeneous, echogenic signal within the subdermal fat measuring approximately 2.4 x 1.2 x 2.1 cm.   There is a 2.2 x 1.7 x 3.3 cm irregular, non parallel, hypoechoic mass with angular and indistinct margin, peripheral vascularity, and some posterior enhancement in the lower central right breast at 6 o'clock 9 cm FN, correlating with the mass-like mammographic finding.  There is skin thickening measuring approximately 0.8 cm and edema in the retroareolar right breast with loss of the normal soft tissue planes.  There is associated hyperemia.   There is a 5.3 x 3.9 x 5.5 cm irregular, non parallel, hypoechoic mass with angular and indistinct margins, peripheral vascularity, and posterior shadowing in the upper outer right breast at 10 o'clock 8 cm FN..     There is a 3.6 x 2.1 x 3.8 cm oval, hypoechoic mass/abnormal lymph node with indistinct margin, minimal internal vascularity, and posterior enhancement in the right axilla at 10 o'clock 14 cm FN. BIRADS-4 suspicious; biopsy recommended.       2. 2023 US SOFT TISSUE HEAD NECK AND THYROID at [facility]- which revealed at area of palpable complaint corresponds with a heterogeneous mixed echogenicity 1.1 x 1 x 0.6 cm lesion with internal color Doppler signal.  Deeper to this there is a 2nd, larger 3.5 x 2.7 x 2.1 cm mixed cystic and solid lesion in the right supraclavicular space.  Cystic components may be related to necrosis or suppurative change.     MRI Breast 23  Findings consistent with advanced stage, aggressive right breast inflammatory carcinoma (particularly given rapid clinical onset) includin. Extensive right breast disease involving the right nipple (with retraction) and all quadrants with mass and non-mass enhancement measuring approximately 17.3 (AP) x 8.3 (TR) x 12 (CC) cm.  There is diffuse right breast skin thickening with  dermal-subdermal enhancement along the lower inner and lower central right breast, anterior to posterior depth, and upper outer right breast, middle to posterior depth, likely correlating with the site of palpable lump.  2. Abnormal signal extending posteriorly to involve the right pectoralis major muscle (with asymmetric tenting) and inferiorly and laterally to involve the soft tissue overlying the right chest/upper abdominal wall at the level of the liver concerning for disease progression.    3. Right axillary 4.2 (AP) x 3 (TR) x 3 (CC) cm mass/replaced level 1 lymph node which is biopsy proven carcinoma.  Additional level 1 and level 2 masses/abnormal lymph nodes in conglomerate measuring approximately 4.2 (AP) x 7.1 (TR) which at least abut the pectoralis minor muscle.  Right supraclavicular 4 cm and 2.2 cm masses/abnormal lymph nodes and 1 cm right internal mammary lymph node.  4. Multiple enhancing sternal lesions concerning for metastatic disease, largest measuring approximately 1.5 cm.  5. Apparent kyphotic deformity of the mid thoracic vertebrae with retrolisthesis.  Recommend further evaluation with PET-CT and to assess for distant metastasis.    MRI Brain 1/19/23  Impression:  1. Multiple enhancing nodules in the posterior fossa with mild surrounding edema, concerning for metastatic disease.  2. Enhancing bone marrow lesions in the occipital bones, concerning for osseous metastases.    1/26/23 PET   Impression:  1. Diffuse FDG uptake throughout the right breast with overlying skin thickening, right axillary lymphadenopathy and uptake in the right chest wall musculature.  2. Bilateral hypermetabolic cervical lymphadenopathy.  3. Diffuse hypermetabolic osseous metastatic disease.    Pathology:  1. 12/30/2022 Ultrasound-guided Core Needle Biopsy Right Breast 6 o'clock 9 cm FN - Invasive carcinoma of no special type (ductal), grade 3   ER less than 1%  MT 0%  HER 2 Negative  Ki67 65%     2. 12/30/2022  Ultrasound-guided Core Needle Biopsy Right Axillary Mass/ Abnormal Lymph Node 10 o'clock 14 cm FN - Invasive carcinoma of no special type (ductal); no lymph node architecture identified    Assessment:   Stage IV Metastatic Triple Negative Inflammatory Breast Cancer - Diagnosed December 2022. Mets to brain and bone.   - Plan for Carboplatin/Gemcitabine Day 1, 8 Q 21 for initial chemotherapy   - Zometa for bone mets - Start March '23  - Radiation Oncology following for brain mets - Completed February '23  - Dr. Shields placed mediport on 1/23/23  - Genetics - Pending  -Chemotherapy associated leukopenia and anemia        Plan:      - Case discussed with Dr. LeJeune.  - Proceed with C2D8 today (Gemzar only).  Add Neulasta 6 mg OnPro; side effects discussed and literature provided.  -CBC in one week.  Patient will be in town and will come here.  - Zometa for bone mets - Will delay until cycle 3  - Follow anti-emetic schedule provided/prescribed.  -RTC 2 weeks for follow-up and continuation of treatment. CBC, CMP that day.    All questions answered.  JOE Correia, FNP-C  Can[facility]

## 2023-03-01 PROBLEM — Z76.89 ENCOUNTER FOR PREVENTION OF NEUTROPENIA DUE TO CHEMOTHERAPY: Status: ACTIVE | Noted: 2023-01-01

## 2023-03-01 NOTE — PROGRESS NOTES
REFERRING PHYSICIAN: Dr. Elizabeth Lejeune    Subjective:       Patient ID: Esme Marie is a 57 y.o. female.    Chief Complaint:  Personal history of recently diagnosed triple negative breast cancer and a family history of cancer. Patient presented for genetic counseling on 1/24/2023and was found appropriate for genetic testing based on the National Comprehensive Cancer Network (NCCN) criteria due to a personal history of triple negative breast cancer diagnosed under 60y (dx57y)  (see family history and pedigree). She signed consent, lab was drawn and sent to [facility] for BRCA1/2 Analyses with myRisk panel testing. She presented via Telemedicine Visit (audio and video) today for results disclosure.    HPI    Past Medical History:   Diagnosis Date    Breast cancer     Hyperlipidemia     Knee pain     Menopausal flushing     Menopause 3/2018    Normocytic normochromic anemia     Vitamin D deficiency         Past Surgical History:   Procedure Laterality Date    PLACEMENT, MEDIPORT N/A 01/23/2023    Procedure: Placement, Mediport;  Surgeon: Silva Shields MD;  Location: [facility];  Service: General;  Laterality: N/A;    TUBAL LIGATION          Review of patient's allergies indicates:  No Known Allergies     Review of Systems                Problem List Items Addressed This Visit    None      Oncology History   Breast cancer metastasized to axillary lymph node, right   1/10/2023 Cancer Staged    Staging form: Breast, AJCC 8th Edition  - Clinical stage from 1/10/2023: Stage IV (cT4d, cN3c(f), cM1, G3, ER-, NC-, HER2-)       1/11/2023 Initial Diagnosis    Breast cancer metastasized to axillary lymph node, right     1/31/2023 -  Chemotherapy    Treatment Summary   Plan Name: OP BREAST GEMCITABINE CARBOPLATIN Q3W  Treatment Goal: Control  Status: Active  Start Date: 1/31/2023  End Date: 1/9/2024 (Planned)  Provider: Elizabeth Kennedy Lejeune, MD  Chemotherapy: CARBOplatin (PARAPLATIN) 740 mg in  "sodium chloride 0.9% 359 mL chemo infusion, 740 mg (100 % of original dose 739.5 mg), Intravenous, Clinic/HOD 1 time, 2 of 17 cycles  Dose modification:   (original dose 739.5 mg, Cycle 1),   (original dose 750 mg, Cycle 2)  Administration: 740 mg (1/31/2023), 750 mg (2/22/2023)  gemcitabine 2,200 mg in sodium chloride 0.9% SolP 342.86 mL chemo infusion, 1,000 mg/m2 = 2,200 mg, Intravenous, Clinic/HOD 1 time, 2 of 17 cycles  Administration: 2,200 mg (1/31/2023), 2,200 mg (2/7/2023), 2,200 mg (2/22/2023)                  Family History   Problem Relation Age of Onset    Hypertension Mother     Diabetes Mother     Arthritis Mother     Lung cancer Father 62        Assessment:       Genetic testing was appropriate for this patient because of her personal and family history. Analysis consisted of sequencing and large rearrangement analyses of the following genes: APC, FABRICE, BARD1, BMPR1A, BRCA1, BRCA2, BRIP1, CDH1, CDK4, CDKN2A (p14ARF and a86yne2n), CHEK2, EPCAM (large rearrangement only), GREM1, MLH1, MSH2, MSH6, MUTYH, NBN, PALB2, PMS2, POLE, POLD1, PTEN, RAD51C, RAD51D, SMAD4, STK11, TP53. . This comprehensive [facility] genetic analysis indicated that there was no deleterious mutation found in any of these genes. 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 This variant has been observed 9658 in previous comprehensive tests and is predicted to be "probably-damaging" by in silico analysis (PolyPhen2 score: 0.979). However, there are currently insufficient data to determine if this variant causes increased cancer risk. Therefore, the contribution of this variant to the relative risk of cancer cannot be established from this analysis. If as a result of ongoing reclassification efforts, [facility] should determine that this variant becomes clinically actionable, an amended report will be sent to me as healthcare provider. I will then contact the patient for a " discussion of implications of this new information and a healthcare plan will be made accordingly.    Mutations in AXIN2 have been found in families where many individuals have large numbers of colorectal polyps and/or colorectal cancer. Individuals with AXIN2 mutations are believed to have a significantly increased risk for colorectal cancer, but the exact risk is not known. Although AXIN2 c.1615G>A (p.[id number]) has not been determined to be deleterious, it would be reasonable to assess this 57 year old individual with colonoscopy.      A copy of her result will be emailed to: [email or web address]     The patient location is: home    Visit type: audiovisual    Face to Face time with patient: 10 minutes  20 minutes of total time spent on the encounter, which includes face to face time and non-face to face time preparing to see the patient (eg, review of tests), Obtaining and/or reviewing separately obtained history, Documenting clinical information in the electronic or other health record, Independently interpreting results (not separately reported) and communicating results to the patient/family/caregiver, or Care coordination (not separately reported).         Each patient to whom he or she provides medical services by telemedicine is:  (1) informed of the relationship between the physician and patient and the respective role of any other health care provider with respect to management of the patient; and (2) notified that he or she may decline to receive medical services by telemedicine and may withdraw from such care at any time.        ALVARO KELLY, PhD

## 2023-03-01 NOTE — PLAN OF CARE
Gemzar given,  Neulasta on-pro applied. Pt instructed on use, medication guide and booklet given to her.  She voices understanding.  She visited with NP today,  NO appointments made at discharged she will call if she does not receive a estephania concerning appointment.  Discharged to home. She also uses patient portal.

## 2023-03-02 NOTE — TELEPHONE ENCOUNTER
I returned patient call. She had left a message on the voicemail of the [facility]fusion [facility] saying when she removed the Neulasta Onpro she noticed some fluid had leaked around the injection site. She said she does think she received the medication but wanted to report the drainage.

## 2023-03-09 NOTE — PROGRESS NOTES
I called Ms. Marie this morning to discuss her lab results.  Mildly symptomatic from her anemia.  I provided reassurance.  I also provided bleeding precautions and advised her to avoid NSAIDs and aspirin for the time being.  She will keep her follow-up  next week.    JOE VAZQUEZ, FNP-C

## 2023-03-15 NOTE — TELEPHONE ENCOUNTER
Rodolfo with [facility] called to get results of venous niva done today. States that her RUE swelling has increased significantly. Per Kely, vascular tech, no DVT noted. Called Rodolfo with this confirmation. He will restart compression tx.

## 2023-03-15 NOTE — PROGRESS NOTES
Subjective:       Patient ID: Esme Marie is a 57 y.o. female.    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 - C1 1/31/23  Zometa - to Start 3/1/23    Treatment History: N/A    HPI:   56yo F who presented in January 2023 for evaluation of R Breast Cancer. She noticed R breast skin changes in December '22 just prior to her already planned annual MMG. Her breast changes including skin changes, darkening, nipple retraction, and breast firmness has developed rapidly over the last month since diagnosis - made worse after her MMG and Breast MRI. She was found to have extensive R breast and axilla abnormality with breast imaging with MMG/US and Breast MRI. 1/26/23 staing PET with evidence of Diffuse FDG uptake throughout the right breast with overlying skin thickening, right axillary lymphadenopathy and uptake in the right chest wall musculature.Bilateral hypermetabolic cervical lymphadenopathy.Diffuse hypermetabolic osseous metastatic disease. She then underwent Brain MRI which showed evidence of metastatic disease.   Since that time she has begun chemotherapy for her metastatic Triple negative breast cancer. She began Carboplatin + Gemcitabine in February 2023. She underwent brain radiation for her known brain mets in February '23.  Will be starting zometa for her osseous disease in March '23.     Interval History  Ms. Marie is here today for an on treatment follow-up visit.    She is due for C3D1 [place].    Last cycle 2 weeks ago she had borderline neutropenia, she continued therapy with the addition of neulasta.   Today she complaints of increased RUE swelling that began approximately 4 days prior. She is trying to elevate it as much as possible. She is not going to PT and missed her appt earlier this week due to cost. She is not wearing her arm brace as she feels it  makes her swelling worse.     OB/GYN History:  Age at Menarche Onset: 14  Menopausal Status: postmenopausal, LMP: 2019  Hysterectomy/Oophorectomy: menopause, at age 54  Hormonal birth control (duration): Yes starting at age 17 used for 2 years.   Pregnancy History:   Age at first live birth: 16  Hormone Replacement Therapy: No, none  Patient did not breast feed      Past Medical History:   Diagnosis Date    Breast cancer     Hyperlipidemia     Knee pain     Menopausal flushing     Menopause 3/2018    Normocytic normochromic anemia     Vitamin D deficiency       Past Surgical History:   Procedure Laterality Date    PLACEMENT, MEDIPORT N/A 2023    Procedure: Placement, Mediport;  Surgeon: Silva Shields MD;  Location: [facility];  Service: General;  Laterality: N/A;    TUBAL LIGATION       Social History     Socioeconomic History    Marital status: Single   Occupational History    Occupation: [employer]   Tobacco Use    Smoking status: Never    Smokeless tobacco: Never   Substance and Sexual Activity    Alcohol use: Yes     Comment: occassionally    Drug use: Never    Sexual activity: Yes     Partners: Male     Birth control/protection: None     Social Determinants of Health     Financial Resource Strain: Low Risk     Difficulty of Paying Living Expenses: Not hard at all   Food Insecurity: No Food Insecurity    Worried About Running Out of Food in the Last Year: Never true    Ran Out of Food in the Last Year: Never true   Transportation Needs: No Transportation Needs    Lack of Transportation (Medical): No    Lack of Transportation (Non-Medical): No   Physical Activity: Inactive    Days of Exercise per Week: 0 days    Minutes of Exercise per Session: 0 min   Stress: No Stress Concern Present    Feeling of Stress : Only a little   Social Connections: Moderately Isolated    Frequency of Communication with Friends and Family: More than three times a week    Frequency of Social Gatherings with  Friends and Family: More than three times a week    Attends Shinto Services: 1 to 4 times per year    Active Member of Clubs or Organizations: No    Attends Club or Organization Meetings: Never    Marital Status: Never    Housing Stability: Unknown    Unable to Pay for Housing in the Last Year: No    Unstable Housing in the Last Year: No      Family History   Problem Relation Age of Onset    Hypertension Mother     Diabetes Mother     Arthritis Mother     Lung cancer Father 62      Review of patient's allergies indicates:  No Known Allergies   Review of Systems   Constitutional:  Negative for appetite change and unexpected weight change.   HENT:  Negative for mouth sores.    Eyes:  Negative for visual disturbance.   Respiratory:  Negative for cough and shortness of breath.    Cardiovascular:  Negative for chest pain.   Gastrointestinal:  Negative for abdominal pain and diarrhea.   Genitourinary:  Negative for frequency.   Musculoskeletal:  Positive for back pain.   Integumentary:  Negative for rash.   Neurological:  Negative for headaches.   Hematological:  Positive for adenopathy.   Psychiatric/Behavioral:  The patient is not nervous/anxious.        Objective:      Vitals:    03/15/23 0958   BP: 113/64   Pulse: (!) 120   Resp: 16   Temp: 98.1 °F (36.7 °C)           Physical Exam  Constitutional:       General: She is not in acute distress.     Appearance: Normal appearance. She is not ill-appearing.   HENT:      Head: Normocephalic and atraumatic.      Nose: Nose normal.      Mouth/Throat:      Mouth: Mucous membranes are moist.      Pharynx: Oropharynx is clear.   Eyes:      Extraocular Movements: Extraocular movements intact.      Conjunctiva/sclera: Conjunctivae normal.   Pulmonary:      Effort: Pulmonary effort is normal. No respiratory distress.   Musculoskeletal:         General: No swelling.      Right lower leg: No edema.      Left lower leg: No edema.   Lymphadenopathy:      Comments: ARIANE  lymphedema   Skin:     General: Skin is dry.      Coloration: Skin is not jaundiced.   Neurological:      General: No focal deficit present.      Mental Status: She is alert and oriented to person, place, and time. Mental status is at baseline.     Chest - Right breast with skin erythema, thickening, and dimpling consistent with inflammatory changes throughout the entire breast. Breast is firm throughout. Nipple retraction. Small area of skin breakdown below nipple.  Right axillary and R supraclavicular LAD palpated. No abnormality noted in left breast or axilla.     LABS AND IMAGING REVIEWED IN EPIC    Imagin. 2022 BL DG MG/ R US BREAST LIMITED at [facility]- which revealed in R breast, a developing asymmetry extending from the retroareolar area to involve all quadrants of the right breast, anterior to posterior depth, measuring approximately 12 cm.  There is a 3.3 cm oval, obscured, equal density mass-like component in the lower central right breast, middle to posterior depth.  Triangle marker is noted on the upper outer right breast demarcating the site of clinical concern with underlying 1.7 cm focal asymmetry in the upper outer right breast, posterior depth.  There is diffuse right breast skin thickening most prominent in the periareolar breast.  There is right nipple retraction.  There is a 3.8 cm oval, high density mass/abnormal lymph node with indistinct margin in the right axilla.   On R US, there is a 1.5 x 0.8 x 1.7 cm irregular, non parallel, heterogeneous dermal-subdermal mass with indistinct margin, minimal internal vascularity, and some posterior shadowing in the upper outer right breast at 10 o'clock 12 cm FN,  the site of patient indicated palpable lump, correlating with the mammographic focal asymmetry.  There is associated skin thickening of approximately 0.4 cm.  There is adjacent heterogeneous, echogenic signal within the subdermal fat measuring approximately 2.4 x 1.2 x 2.1 cm.   There is  a 2.2 x 1.7 x 3.3 cm irregular, non parallel, hypoechoic mass with angular and indistinct margin, peripheral vascularity, and some posterior enhancement in the lower central right breast at 6 o'clock 9 cm FN, correlating with the mass-like mammographic finding.  There is skin thickening measuring approximately 0.8 cm and edema in the retroareolar right breast with loss of the normal soft tissue planes.  There is associated hyperemia.   There is a 5.3 x 3.9 x 5.5 cm irregular, non parallel, hypoechoic mass with angular and indistinct margins, peripheral vascularity, and posterior shadowing in the upper outer right breast at 10 o'clock 8 cm FN..     There is a 3.6 x 2.1 x 3.8 cm oval, hypoechoic mass/abnormal lymph node with indistinct margin, minimal internal vascularity, and posterior enhancement in the right axilla at 10 o'clock 14 cm FN. BIRADS-4 suspicious; biopsy recommended.       2. 2023 US SOFT TISSUE HEAD NECK AND THYROID at [facility]- which revealed at area of palpable complaint corresponds with a heterogeneous mixed echogenicity 1.1 x 1 x 0.6 cm lesion with internal color Doppler signal.  Deeper to this there is a 2nd, larger 3.5 x 2.7 x 2.1 cm mixed cystic and solid lesion in the right supraclavicular space.  Cystic components may be related to necrosis or suppurative change.     MRI Breast 23  Findings consistent with advanced stage, aggressive right breast inflammatory carcinoma (particularly given rapid clinical onset) includin. Extensive right breast disease involving the right nipple (with retraction) and all quadrants with mass and non-mass enhancement measuring approximately 17.3 (AP) x 8.3 (TR) x 12 (CC) cm.  There is diffuse right breast skin thickening with dermal-subdermal enhancement along the lower inner and lower central right breast, anterior to posterior depth, and upper outer right breast, middle to posterior depth, likely correlating with the site of palpable lump.  2. Abnormal  signal extending posteriorly to involve the right pectoralis major muscle (with asymmetric tenting) and inferiorly and laterally to involve the soft tissue overlying the right chest/upper abdominal wall at the level of the liver concerning for disease progression.    3. Right axillary 4.2 (AP) x 3 (TR) x 3 (CC) cm mass/replaced level 1 lymph node which is biopsy proven carcinoma.  Additional level 1 and level 2 masses/abnormal lymph nodes in conglomerate measuring approximately 4.2 (AP) x 7.1 (TR) which at least abut the pectoralis minor muscle.  Right supraclavicular 4 cm and 2.2 cm masses/abnormal lymph nodes and 1 cm right internal mammary lymph node.  4. Multiple enhancing sternal lesions concerning for metastatic disease, largest measuring approximately 1.5 cm.  5. Apparent kyphotic deformity of the mid thoracic vertebrae with retrolisthesis.  Recommend further evaluation with PET-CT and to assess for distant metastasis.    MRI Brain 1/19/23  Impression:  1. Multiple enhancing nodules in the posterior fossa with mild surrounding edema, concerning for metastatic disease.  2. Enhancing bone marrow lesions in the occipital bones, concerning for osseous metastases.    1/26/23 PET   Impression:  1. Diffuse FDG uptake throughout the right breast with overlying skin thickening, right axillary lymphadenopathy and uptake in the right chest wall musculature.  2. Bilateral hypermetabolic cervical lymphadenopathy.  3. Diffuse hypermetabolic osseous metastatic disease.    Pathology:  1. 12/30/2022 Ultrasound-guided Core Needle Biopsy Right Breast 6 o'clock 9 cm FN - Invasive carcinoma of no special type (ductal), grade 3   ER less than 1%  OK 0%  HER 2 Negative  Ki67 65%     2. 12/30/2022 Ultrasound-guided Core Needle Biopsy Right Axillary Mass/ Abnormal Lymph Node 10 o'clock 14 cm FN - Invasive carcinoma of no special type (ductal); no lymph node architecture identified    Assessment:   Stage IV Metastatic Triple Negative  Inflammatory Breast Cancer - Diagnosed December 2022. Mets to brain and bone.   - Plan for Carboplatin/Gemcitabine Day 1, 8 Q 21 for initial chemotherapy   - Zometa for bone mets - Start March '23  - Radiation Oncology following for brain mets - Completed February '23  - Dr. Shields placed mediport on 1/23/23  - Genetics - Pending  -Chemotherapy associated leukopenia and anemia        Plan:      - Due to neutropenia, will hold on chemotherapy today  - Neupogen 480mcg and 1L IVF today  - When she proceeds with chemotherapy, will dose reduce gemcitabine by 20% and continue neulasta with D8  - Zometa for bone mets - Will delay until cycle 3  - Follow anti-emetic schedule provided/prescribed.  - RTC 1 week for MD visit, labs same day for possible C3D1 with 20% gem dose reduction  - Vascular US RUE to rule out DVT given worsening swelling     Elizabeth Kennedy Lejeune, MD  Hematology/Oncology  [facility]

## 2023-03-20 NOTE — TELEPHONE ENCOUNTER
Pts daughter in law called stating that the pt was having really bad pain in her right arm. I let pt know that Dr.Lejeune ordered a urgent CT of her arm/shoulder and that she could take tramadol and hydrocodone-acetaminophen as needed for the pain. Pt and DNL stated they understood. Pt sees Dr.Lejeune on 3/22 I let her know to call if the pain gets worse.

## 2023-03-21 NOTE — TELEPHONE ENCOUNTER
Patient's daughter-in-law is stating that the right arm pain has gotten worse. Tramadol and norco not helping. Had CT scan done; results in system. She is requesting better pain control. Please advise.

## 2023-03-22 NOTE — PROGRESS NOTES
Subjective:       Patient ID: Esme Marie is a 57 y.o. female.    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 She noticed R breast skin changes in December '22 just prior to her already planned annual MMG. Her breast changes including skin changes, darkening, nipple retraction, and breast firmness has developed rapidly over the last month since diagnosis - made worse after her MMG and Breast MRI. She was found to have extensive R breast and axilla abnormality with breast imaging with MMG/US and Breast MRI. 1/26/23 staing PET with evidence of Diffuse FDG uptake throughout the right breast with overlying skin thickening, right axillary lymphadenopathy and uptake in the right chest wall musculature.Bilateral hypermetabolic cervical lymphadenopathy.Diffuse hypermetabolic osseous metastatic disease. She then underwent Brain MRI which showed evidence of metastatic disease.   Since that time she has begun chemotherapy for her metastatic Triple negative breast cancer. She began Carboplatin + Gemcitabine in February 2023. She underwent brain radiation for her known brain mets in February '23.  Will be starting zometa for her osseous disease in March '23.     Interval History  Ms. Marie is here today for an on treatment follow-up visit.    She had a 1 week delay of C3D1 [place]/Steele last week due to neutropenia despite addition of neulasta with last cycle.   She has had worsening RUE swelling and pain over the last 1.5 weeks. RUE US without evidence of DVT. CT without evidence of abscess/infection. This appears to be all due to her disease and worsening lymphedema. She is following with PT and keeping her arm elevated. She finds it too  painful to wrap it.   Today she states that the new pain medications I sent in yesterday are helping her pain.   Discussed at length her R arm lymphedema and things to do at home to help with this.   She is also still having some dizziness, may be due to post radiation changes. Will try meclizine over the next week. If no change in 1 week will image.     OB/GYN History:  Age at Menarche Onset: 14  Menopausal Status: postmenopausal, LMP: 2019  Hysterectomy/Oophorectomy: menopause, at age 54  Hormonal birth control (duration): Yes starting at age 17 used for 2 years.   Pregnancy History:   Age at first live birth: 16  Hormone Replacement Therapy: No, none  Patient did not breast feed      Past Medical History:   Diagnosis Date    Breast cancer     Hyperlipidemia     Knee pain     Menopausal flushing     Menopause 3/2018    Normocytic normochromic anemia     Vitamin D deficiency       Past Surgical History:   Procedure Laterality Date    PLACEMENT, MEDIPORT N/A 2023    Procedure: Placement, Mediport;  Surgeon: Silva Shields MD;  Location: [facility];  Service: General;  Laterality: N/A;    TUBAL LIGATION       Social History     Socioeconomic History    Marital status: Single   Occupational History    Occupation: [employer]   Tobacco Use    Smoking status: Never    Smokeless tobacco: Never   Substance and Sexual Activity    Alcohol use: Yes     Comment: occassionally    Drug use: Never    Sexual activity: Yes     Partners: Male     Birth control/protection: None     Social Determinants of Health     Financial Resource Strain: Low Risk     Difficulty of Paying Living Expenses: Not hard at all   Food Insecurity: No Food Insecurity    Worried About Running Out of Food in the Last Year: Never true    Ran Out of Food in the Last Year: Never true   Transportation Needs: No Transportation Needs    Lack of Transportation (Medical): No    Lack of Transportation (Non-Medical): No   Physical Activity:  Inactive    Days of Exercise per Week: 0 days    Minutes of Exercise per Session: 0 min   Stress: No Stress Concern Present    Feeling of Stress : Only a little   Social Connections: Moderately Isolated    Frequency of Communication with Friends and Family: More than three times a week    Frequency of Social Gatherings with Friends and Family: More than three times a week    Attends Buddhist Services: 1 to 4 times per year    Active Member of Clubs or Organizations: No    Attends Club or Organization Meetings: Never    Marital Status: Never    Housing Stability: Unknown    Unable to Pay for Housing in the Last Year: No    Unstable Housing in the Last Year: No      Family History   Problem Relation Age of Onset    Hypertension Mother     Diabetes Mother     Arthritis Mother     Lung cancer Father 62      Review of patient's allergies indicates:  No Known Allergies   Review of Systems   Constitutional:  Negative for appetite change and unexpected weight change.   HENT:  Negative for mouth sores.    Eyes:  Negative for visual disturbance.   Respiratory:  Negative for cough and shortness of breath.    Cardiovascular:  Negative for chest pain.   Gastrointestinal:  Negative for abdominal pain and diarrhea.   Genitourinary:  Negative for frequency.   Musculoskeletal:  Positive for back pain.   Integumentary:  Negative for rash.   Neurological:  Negative for headaches.   Hematological:  Positive for adenopathy.   Psychiatric/Behavioral:  The patient is not nervous/anxious.        Objective:      Vitals:    03/22/23 1128   BP: 118/60   Pulse: 102   Resp: 18   Temp: 98.1 °F (36.7 °C)             Physical Exam  Constitutional:       General: She is not in acute distress.     Appearance: Normal appearance. She is not ill-appearing.   HENT:      Head: Normocephalic and atraumatic.      Nose: Nose normal.      Mouth/Throat:      Mouth: Mucous membranes are moist.      Pharynx: Oropharynx is clear.   Eyes:      Extraocular  Movements: Extraocular movements intact.      Conjunctiva/sclera: Conjunctivae normal.   Pulmonary:      Effort: Pulmonary effort is normal. No respiratory distress.   Musculoskeletal:         General: No swelling.      Right lower leg: No edema.      Left lower leg: No edema.   Lymphadenopathy:      Comments: RUE lymphedema   Skin:     General: Skin is dry.      Coloration: Skin is not jaundiced.   Neurological:      General: No focal deficit present.      Mental Status: She is alert and oriented to person, place, and time. Mental status is at baseline.     Chest - Right breast with skin erythema, thickening, and dimpling consistent with inflammatory changes throughout the entire breast. Breast is firm throughout. Nipple retraction. Small area of skin breakdown below nipple.  Right axillary and R supraclavicular LAD palpated. No abnormality noted in left breast or axilla.     LABS AND IMAGING REVIEWED IN EPIC    Imagin. 2022 BL DG MG/ R US BREAST LIMITED at [facility]- which revealed in R breast, a developing asymmetry extending from the retroareolar area to involve all quadrants of the right breast, anterior to posterior depth, measuring approximately 12 cm.  There is a 3.3 cm oval, obscured, equal density mass-like component in the lower central right breast, middle to posterior depth.  Triangle marker is noted on the upper outer right breast demarcating the site of clinical concern with underlying 1.7 cm focal asymmetry in the upper outer right breast, posterior depth.  There is diffuse right breast skin thickening most prominent in the periareolar breast.  There is right nipple retraction.  There is a 3.8 cm oval, high density mass/abnormal lymph node with indistinct margin in the right axilla.   On R US, there is a 1.5 x 0.8 x 1.7 cm irregular, non parallel, heterogeneous dermal-subdermal mass with indistinct margin, minimal internal vascularity, and some posterior shadowing in the upper outer right  breast at 10 o'clock 12 cm FN,  the site of patient indicated palpable lump, correlating with the mammographic focal asymmetry.  There is associated skin thickening of approximately 0.4 cm.  There is adjacent heterogeneous, echogenic signal within the subdermal fat measuring approximately 2.4 x 1.2 x 2.1 cm.   There is a 2.2 x 1.7 x 3.3 cm irregular, non parallel, hypoechoic mass with angular and indistinct margin, peripheral vascularity, and some posterior enhancement in the lower central right breast at 6 o'clock 9 cm FN, correlating with the mass-like mammographic finding.  There is skin thickening measuring approximately 0.8 cm and edema in the retroareolar right breast with loss of the normal soft tissue planes.  There is associated hyperemia.   There is a 5.3 x 3.9 x 5.5 cm irregular, non parallel, hypoechoic mass with angular and indistinct margins, peripheral vascularity, and posterior shadowing in the upper outer right breast at 10 o'clock 8 cm FN..     There is a 3.6 x 2.1 x 3.8 cm oval, hypoechoic mass/abnormal lymph node with indistinct margin, minimal internal vascularity, and posterior enhancement in the right axilla at 10 o'clock 14 cm FN. BIRADS-4 suspicious; biopsy recommended.       2. 2023 US SOFT TISSUE HEAD NECK AND THYROID at [facility]- which revealed at area of palpable complaint corresponds with a heterogeneous mixed echogenicity 1.1 x 1 x 0.6 cm lesion with internal color Doppler signal.  Deeper to this there is a 2nd, larger 3.5 x 2.7 x 2.1 cm mixed cystic and solid lesion in the right supraclavicular space.  Cystic components may be related to necrosis or suppurative change.     MRI Breast 23  Findings consistent with advanced stage, aggressive right breast inflammatory carcinoma (particularly given rapid clinical onset) includin. Extensive right breast disease involving the right nipple (with retraction) and all quadrants with mass and non-mass enhancement measuring approximately  17.3 (AP) x 8.3 (TR) x 12 (CC) cm.  There is diffuse right breast skin thickening with dermal-subdermal enhancement along the lower inner and lower central right breast, anterior to posterior depth, and upper outer right breast, middle to posterior depth, likely correlating with the site of palpable lump.  2. Abnormal signal extending posteriorly to involve the right pectoralis major muscle (with asymmetric tenting) and inferiorly and laterally to involve the soft tissue overlying the right chest/upper abdominal wall at the level of the liver concerning for disease progression.    3. Right axillary 4.2 (AP) x 3 (TR) x 3 (CC) cm mass/replaced level 1 lymph node which is biopsy proven carcinoma.  Additional level 1 and level 2 masses/abnormal lymph nodes in conglomerate measuring approximately 4.2 (AP) x 7.1 (TR) which at least abut the pectoralis minor muscle.  Right supraclavicular 4 cm and 2.2 cm masses/abnormal lymph nodes and 1 cm right internal mammary lymph node.  4. Multiple enhancing sternal lesions concerning for metastatic disease, largest measuring approximately 1.5 cm.  5. Apparent kyphotic deformity of the mid thoracic vertebrae with retrolisthesis.  Recommend further evaluation with PET-CT and to assess for distant metastasis.    MRI Brain 1/19/23  Impression:  1. Multiple enhancing nodules in the posterior fossa with mild surrounding edema, concerning for metastatic disease.  2. Enhancing bone marrow lesions in the occipital bones, concerning for osseous metastases.    1/26/23 PET   Impression:  1. Diffuse FDG uptake throughout the right breast with overlying skin thickening, right axillary lymphadenopathy and uptake in the right chest wall musculature.  2. Bilateral hypermetabolic cervical lymphadenopathy.  3. Diffuse hypermetabolic osseous metastatic disease.    Pathology:  1. 12/30/2022 Ultrasound-guided Core Needle Biopsy Right Breast 6 o'clock 9 cm FN - Invasive carcinoma of no special type  (ductal), grade 3   ER less than 1%  AK 0%  HER 2 Negative  Ki67 65%     2. 12/30/2022 Ultrasound-guided Core Needle Biopsy Right Axillary Mass/ Abnormal Lymph Node 10 o'clock 14 cm FN - Invasive carcinoma of no special type (ductal); no lymph node architecture identified    Assessment:   Stage IV Metastatic Triple Negative Inflammatory Breast Cancer - Diagnosed December 2022. Mets to brain and bone.   - Plan for Carboplatin/Gemcitabine Day 1, 8 Q 21 for initial chemotherapy   - Zometa for bone mets - Started March '23  - Radiation Oncology following for brain mets - Completed February '23  - Dr. Shields placed mediport on 1/23/23  - Genetics - Pending  -Chemotherapy associated leukopenia and anemia        Plan:      - Proceed with C3D1 of carbo/[place] - dose reduce gemcitabine by 20% and continue neulasta with D8  - Zometa for bone mets - due today  - Follow anti-emetic schedule provided/prescribed.  - Continue MS Contin 15mg BID will change from norco to oxycodone 10mg Q4H due to new mild transaminitis  - XRT to RUE for possible lymphedema relief, will ask Dr. Elliott - if she starts XRT would continue Carbo at AUC 2 weekly and hold gemcitabine during that time.   - Handicap tag give, shower chair and rolling walker ordered, referral to palliative care discussed and ordered  - RTC 1 week with NP- Ivy, labs same day, treatment same day due to distance    Elizabeth Kennedy Lejeune, MD  Hematology/Oncology  [facility]

## 2023-03-23 PROBLEM — G89.3 CANCER RELATED PAIN: Status: ACTIVE | Noted: 2023-01-01

## 2023-03-23 NOTE — FIRST PROVIDER EVALUATION
Medical screening examination initiated.  I have conducted a focused provider triage encounter, findings are as follows:    Brief history of present illness:  Patient states SOB, dizziness, bilateral lower extremity weakness, and confusion x 2-3 days. States that she is currently on chemo for breast CA.    There were no vitals filed for this visit.    Pertinent physical exam:  Awake, alert    Brief workup plan:  Labs, EKG, Imaging    Preliminary workup initiated; this workup will be continued and followed by the physician or advanced practice provider that is assigned to the patient when roomed.

## 2023-03-23 NOTE — Clinical Note
Diagnosis: Generalized weakness [215657]   Admitting Provider:: MARY DIAZ [[id number]]   Future Attending Provider: MARY DIAZ [[id number]]   Reason for IP Medical Treatment  (Clinical interventions that can only be accomplished in the IP setting? ) :: Generalized weakness   I certify that Inpatient services for greater than or equal to 2 midnights are medically necessary:: Yes   Plans for Post-Acute care--if anticipated (pick the single best option):: A. No post acute care anticipated at this time

## 2023-03-23 NOTE — ED PROVIDER NOTES
"Encounter Date: 3/23/2023    SCRIBE #1 NOTE: I, Zoë Lima, am scribing for, and in the presence of,  Jakub Clemens MD. I have scribed the following portions of the note - Other sections scribed: HPI, ROS, PE, MDM.     History     Chief Complaint   Patient presents with    Shortness of Breath     C/o SOB, dizziness, bilateral LE weakness, confusion, and "spots" in her vision that began the past couple days. 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 Sees Dr. Lejeune. GCS 15.     57 Y.O. female with a history of stage IV breast CA (metastasis to R axillary lymph node and brain) presents to the ED for worsening neurologic issues onset two days ago. Pt was sent by Dr. Lejeune for her complaints of dizziness, weakness, confusion, disorientation, vision disturbance ("spots"), and gait problems, all of which worsen with walking. She further complains of SOB and RUE swelling + pain. Pt had chemotherapy yesterday as well as an US of her RUE. Currently on Hydrocodone, Oxycodone, and Morphine with no relief. Pt's PCP is ASHLEY Vogel.    The history is provided by the patient. No  was used.   Neurologic Problem  The primary symptoms include altered mental status and dizziness. Primary symptoms do not include fever. The symptoms began several days ago. The symptoms are worsening.   The change in mental status includes confusion, disorientation and a gait disturbance.   Dizziness also occurs with weakness.   Additional symptoms include weakness. Medical issues also include cancer.   Review of patient's allergies indicates:  No Known Allergies  Past Medical History:   Diagnosis Date    Breast cancer     Hyperlipidemia     Knee pain     Menopausal flushing     Menopause 3/2018    Normocytic normochromic anemia     Vitamin D deficiency      Past Surgical History:   Procedure Laterality Date    PLACEMENT, MEDIPORT N/A 01/23/2023    Procedure: " Placement, [facility];  Surgeon: Silva Shields MD;  Location: [facility];  Service: General;  Laterality: N/A;    TUBAL LIGATION       Family History   Problem Relation Age of Onset    Hypertension Mother     Diabetes Mother     Arthritis Mother     Lung cancer Father 62     Social History     Tobacco Use    Smoking status: Never    Smokeless tobacco: Never   Substance Use Topics    Alcohol use: Yes     Comment: occassionally    Drug use: Never     Review of Systems   Constitutional:  Negative for fever.   HENT:  Negative for sore throat.    Eyes:  Positive for visual disturbance.   Respiratory:  Positive for shortness of breath.    Cardiovascular:  Negative for chest pain.   Gastrointestinal:  Negative for abdominal pain.   Genitourinary:  Negative for dysuria.   Musculoskeletal:  Positive for gait problem. Negative for joint swelling.        RUE swelling and pain.   Skin:  Negative for rash.   Neurological:  Positive for dizziness and weakness.   Psychiatric/Behavioral:  Negative for confusion.      Physical Exam     Initial Vitals [03/23/23 0939]   BP Pulse Resp Temp SpO2   119/81 (!) 119 16 98.7 °F (37.1 °C) 100 %      MAP       --         Physical Exam    Nursing note and vitals reviewed.  Constitutional: She appears well-developed and well-nourished.   HENT:   Head: Normocephalic and atraumatic.   Eyes: EOM are normal. Pupils are equal, round, and reactive to light.   Neck:   Normal range of motion.  Cardiovascular:  Regular rhythm, normal heart sounds and intact distal pulses.   Tachycardia present.         No murmur heard.  Pulmonary/Chest: No respiratory distress. She has wheezes in the right upper field, the right middle field, the right lower field, the left upper field, the left middle field and the left lower field. She has no rales.   Abdominal: Abdomen is soft. She exhibits no distension. There is no abdominal tenderness. There is no rebound.   Musculoskeletal:         General: No tenderness. Normal  range of motion.      Right upper arm: Edema present.      Cervical back: Normal range of motion.      Comments: RUE lymphoma and 3+ edema.     Neurological: She is alert. She has normal strength. No cranial nerve deficit. GCS score is 15. GCS eye subscore is 4. GCS verbal subscore is 5. GCS motor subscore is 6.   Skin: Skin is warm and dry. Capillary refill takes less than 2 seconds. No rash noted. No erythema.   Psychiatric: She has a normal mood and affect.       ED Course   Procedures  Labs Reviewed   COMPREHENSIVE METABOLIC PANEL - Abnormal; Notable for the following components:       Result Value    Glucose Level 144 (*)     Protein Total 6.3 (*)     Albumin Level 3.0 (*)     Albumin/Globulin Ratio 0.9 (*)     Alkaline Phosphatase 163 (*)     Alanine Aminotransferase 86 (*)     Aspartate Aminotransferase 60 (*)     All other components within normal limits   URINALYSIS, REFLEX TO URINE CULTURE - Abnormal; Notable for the following components:    Leukocyte Esterase, UA Trace (*)     All other components within normal limits   CBC WITH DIFFERENTIAL - Abnormal; Notable for the following components:    WBC 13.9 (*)     RBC 3.03 (*)     Hgb 8.3 (*)     Hct 27.0 (*)     MCHC 30.7 (*)     Platelet 467 (*)     All other components within normal limits   MANUAL DIFFERENTIAL - Abnormal; Notable for the following components:    Abs Lymp 0.556 (*)     Abs Neut 13.066 (*)     Anisocyte 1+ (*)     Macrocyte 1+ (*)     Platelet Est Increased (*)     All other components within normal limits   URINALYSIS, MICROSCOPIC - Abnormal; Notable for the following components:    Bacteria, UA 4+ (*)     All other components within normal limits   B-TYPE NATRIURETIC PEPTIDE - Normal   TROPONIN I - Normal   COVID/FLU A&B PCR - Normal    Narrative:     The Xpert Xpress SARS-CoV-2/FLU/RSV plus is a rapid, multiplexed real-time PCR test intended for the simultaneous qualitative detection and differentiation of SARS-CoV-2, Influenza A,  Influenza B, and respiratory syncytial virus (RSV) viral RNA in either nasopharyngeal swab or nasal swab specimens.         CBC W/ AUTO DIFFERENTIAL    Narrative:     The following orders were created for panel order CBC Auto Differential.  Procedure                               Abnormality         Status                     ---------                               -----------         ------                     CBC with Differential[[id number]]        Abnormal            Final result               Manual Differential[[id number]]          Abnormal            Final result                 Please view results for these tests on the individual orders.     EKG Readings: (Independently Interpreted)   Sinus tachycardia.  Rate of 119.  Low voltage QRS.  No STEMI   ECG Results              EKG 12-lead (Final result)  Result time 03/24/23 08:46:23      Final result by Interface, Lab In [facility] (03/24/23 08:46:23)                   Narrative:    Test Reason : R00.0,    Vent. Rate : 110 BPM     Atrial Rate : 110 BPM     P-R Int : 144 ms          QRS Dur : 060 ms      QT Int : 324 ms       P-R-T Axes : 065 047 039 degrees     QTc Int : 438 ms    Sinus tachycardia  Low voltage QRS  Borderline Abnormal ECG  When compared with ECG of 23-MAR-2023 09:41,  No significant change was found  Confirmed by Hernandez Martínez MD ([id number]) on 3/24/2023 8:46:16 AM    Referred By:             Confirmed By:Hernandez Martínez MD                                     EKG 12-lead (Final result)  Result time 03/23/23 16:16:16      Final result by Interface, Lab In [facility] (03/23/23 16:16:16)                   Narrative:    Test Reason : R06.02,    Vent. Rate : 119 BPM     Atrial Rate : 119 BPM     P-R Int : 144 ms          QRS Dur : 064 ms      QT Int : 310 ms       P-R-T Axes : 049 053 014 degrees     QTc Int : 436 ms    Sinus tachycardia  Low voltage QRS  Cannot rule out Anterior infarct ,age undetermined  Abnormal ECG    Confirmed by Celio Jeffrey MD ([id number]) on  3/23/2023 4:16:05 PM    Referred By:             Confirmed By:Celio Jeffrey MD                                     EKG 12-LEAD (Final result)  Result time 04/04/23 11:20:41      Final result by Unknown User (04/04/23 11:20:41)                                      Imaging Results              CT Soft Tissue Neck With Contrast (Final result)  Result time 03/24/23 09:45:33      Final result by Janice Bradshaw MD (03/24/23 09:45:33)                   Impression:      1. Bilateral cervical lymphadenopathy, right greater than left.  2. Subcutaneous edema in the right lower neck and chest wall.  No drainable fluid collection identified.  No significant change from the [facility] interpretation.      Electronically signed by: Janice Bradshaw  Date:    03/24/2023  Time:    09:45               Narrative:    EXAMINATION:  CT SOFT TISSUE NECK WITH CONTRAST    CLINICAL HISTORY:  Lymphadenopathy, neck;    TECHNIQUE:  Axial CT images of the neck were obtained after intravenous contrast. Reformatted images in the sagittal and coronal plane were included.    Automatic exposure control was utilized to limit radiation dose.    DLP: 431 mGy-cm    COMPARISON:  None.    FINDINGS:  The airways are patent.  There are mildly enlarged bilateral cervical lymph nodes, right greater than left.  Heterogeneous right upper cervical lymph node measures 1.2 cm in short axis (series 2, image 42).  A left supraclavicular lymph node measures 1.2 cm in short axis (series 2, image 61).  There is subcutaneous edema in the right lower neck and anterior chest wall.  There is no drainable fluid collection identified.    The parotid glands, submandibular glands and thyroid gland are unremarkable.  The major vessels in the neck enhance normally.  There is no acute abnormality of the orbits or visualized brain parenchyma.  There is no destructive bone lesion.  Right pleural effusion is noted with overlying dependent airspace consolidation.                         Preliminary result by Janice Bradshaw MD (03/24/23 05:07:24)                   Narrative:    START OF REPORT:  TECHNIQUE: CT OF THE SOFT TISSUES OF THE NECK WAS PERFORMED WITH INTRAVENOUS CONTRAST WITH DIRECT AXIAL AS WELL AS SAGITTAL AND CORONAL REFORMATIONS.    COMPARISON: NONE.    CLINICAL HISTORY: LYMPHADENOPATHY.    Findings: There is diffuse subcutaneous edema in the right ventral chest wall with asymmetric enlargement of the right chest wall muscles. These findings may reflect cellulitis versus myositis. No discrete fluid collection or abscess is identified.  Sinuses: The visualized paranasal sinuses are clear.  Nasopharynx: Unremarkable.  Oropharynx: Unremarkable.  Larynx: Unremarkable.  Trachea: Unremarkable.  Esophagus: Unremarkable.  Vascular structures: Unremarkable.  Thyroid glands: Unremarkable. Bilateral parotid and submandibular glands appear unremarkable.  Lymph nodes: Multiple enlarged cervical lymph nodes are seen in the right neck at level II, III and IV, the largest measures approximately 1.4 cm in least dimension at level II (series 2, image 42).    Bony structures: Degenerative changes are present in the spine. There is reversal of the cervical lordosis, the reversal is centered at C3-C4.    Miscellaneous: Moderate right apical pleural effusion is seen. The left lung apex is cleart. There is a Port-A-Cath in the left chest wall with its tip in the superior vena cava.      Impression:  1. There is diffuse subcutaneous edema in the right ventral chest wall with asymmetric enlargement of the right chest wall muscles. These findings may reflect cellulitis versus myositis. No discrete fluid collection or abscess is identified.  2. Moderate right apical pleural effusion is seen.  3. Multiple enlarged cervical lymph nodes are seen in the right neck at level II, III and IV, the largest measures approximately 1.4 cm in least dimension at level II (series 2, image 42). Correlate clinically as  regards further evaluation and followup.  4. Details and other findings as described above.                          Preliminary result by Chucho Miguel Jr., MD (03/24/23 05:07:24)                   Narrative:    START OF REPORT:  TECHNIQUE: CT OF THE SOFT TISSUES OF THE NECK WAS PERFORMED WITH INTRAVENOUS CONTRAST WITH DIRECT AXIAL AS WELL AS SAGITTAL AND CORONAL REFORMATIONS.    COMPARISON: NONE.    CLINICAL HISTORY: LYMPHADENOPATHY.    Findings: There is diffuse subcutaneous edema in the right ventral chest wall with asymmetric enlargement of the right chest wall muscles. These findings may reflect cellulitis versus myositis. No discrete fluid collection or abscess is identified.  Sinuses: The visualized paranasal sinuses are clear.  Nasopharynx: Unremarkable.  Oropharynx: Unremarkable.  Larynx: Unremarkable.  Trachea: Unremarkable.  Esophagus: Unremarkable.  Vascular structures: Unremarkable.  Thyroid glands: Unremarkable. Bilateral parotid and submandibular glands appear unremarkable.  Lymph nodes: Multiple enlarged cervical lymph nodes are seen in the right neck at level II, III and IV, the largest measures approximately 1.4 cm in least dimension at level II (series 2, image 42).    Bony structures: Degenerative changes are present in the spine. There is reversal of the cervical lordosis, the reversal is centered at C3-C4.    Miscellaneous: Moderate right apical pleural effusion is seen. The left lung apex is cleart. There is a Port-A-Cath in the left chest wall with its tip in the superior vena cava.      Impression:  1. There is diffuse subcutaneous edema in the right ventral chest wall with asymmetric enlargement of the right chest wall muscles. These findings may reflect cellulitis versus myositis. No discrete fluid collection or abscess is identified.  2. Moderate right apical pleural effusion is seen.  3. Multiple enlarged cervical lymph nodes are seen in the right neck at level II, III and IV,  the largest measures approximately 1.4 cm in least dimension at level II (series 2, image 42). Correlate clinically as regards further evaluation and followup.  4. Details and other findings as described above.                                         MRI Brain W WO Contrast (Final result)  Result time 03/24/23 10:38:11      Final result by Janice Bradshaw MD (03/24/23 10:38:11)                   Impression:      1. Interval decrease in size and number of enhancing cerebellar metastases.  2. New leptomeningeal enhancement, predominantly in the posterior fossa.  Findings may be treatment related, however metastatic disease and infection are in the differential.  No major change from the [facility] interpretation.      Electronically signed by: Janice Bradshaw  Date:    03/24/2023  Time:    10:38               Narrative:    EXAMINATION:  MRI BRAIN W WO CONTRAST    CLINICAL HISTORY:  Brain/CNS neoplasm, assess treatment response;Brain metastases, monitor;    TECHNIQUE:  Multiplanar, multisequence MR images of the brain were obtained with and without administration of intravenous contrast.    COMPARISON:  MRI brain dated 01/31/2023    FINDINGS:  Evaluation is limited by motion artifact.  There is no restricted diffusion.  The cerebellar parenchymal nodules have decreased in size and number.  Residual nodule along the left posterior cerebellar convexity now measures 10 mm compared to 16 mm previously (series 10, image 22).  There is new leptomeningeal enhancement predominantly seen in the posterior fossa, but also possibly in the bilateral anterior frontal and temporal lobes.    There is no significant mass effect or midline shift.  The basal cisterns are patent.  There is no hydrocephalus or abnormal extra-axial fluid collection.  The major intracranial flow voids are patent.  There is trace scattered paranasal sinus mucosal thickening.  Scattered enhancing bone marrow lesions are redemonstrated.                         Preliminary result by Janice Bradshaw MD (03/24/23 04:56:19)                   Narrative:    START OF REPORT:  TECHNIQUE: MULTIPLANAR, MULTISEQUENCE MAGNETIC RESONANCE IMAGING OF THE BRAIN WAS PERFORMED WITHOUT AND THEN WITH INTRAVENOUS CONTRAST.    COMPARISON: CORRELATION IS WITH CT STUDY DATED 2023-03-23 16:31:18.    CLINICAL HISTORY: HISTORY OF BRAIN METS.    Findings:  Hemorrhage: No acute intracranial hemorrhage is identified.  Stroke: No abnormal signal is identified on the diffusion images to suggest acute infarct.  Extra axial spaces: The ventricles and sulci and basal cisterns all appear mildly prominent consistent with global cerebral atrophy.  Cerebral, cerebellar, and brainstem parenchyma: Mild white matter signal abnormalities are seen. The main consideration is small vessel ischemic changes in a patient of this age. There is mild leptomeningeal enhancement of bilateral tentorium and in the superior cerebellar vermis (series 10, image 19 and series 11, image 13), faintly appreciated on the prior CT. There is also mild enhancement in the inferior aspect of the cerebellum bilaterally (series 10, image 25). There is a small dural based enhancing lesion in the posterior aspect of the left superior cerebellum (series 12, image 22). The lesion is isointense to the cortex on T1 and T2 weighted sequence and is not appreciated on the CT examination. 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  Herniation: None.  Calvarium: Within normal limits.  Vascular system: Normal flow voids.  Visualized paranasal sinuses: Normal.  Visualized orbits: The visualized orbits appear unremarkable.  Sella and skull base: Normal.  Temporal bones and mastoids: Within normal limits.      Impression:  1. No acute intracranial hemorrhage is identified.  2. No abnormal signal is identified on the diffusion images to suggest acute infarct.  3. There is mild leptomeningeal enhancement  of bilateral tentorium and in the superior cerebellar vermis (series 10, image 19 and series 11, image 13), faintly appreciated on the prior CT. There is also mild enhancement in the inferior aspect of the cerebellum bilaterally (series 10, image 25). There is a small dural based enhancing lesion in the posterior aspect of the left superior cerebellum (series 12, image 22). The lesion is isointense to the cortex on T1 and T2 weighted sequence and is not appreciated on the CT examination. 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  4. Details and findings as above.                          Preliminary result by Chucho Miguel Jr., MD (03/24/23 04:56:19)                   Narrative:    START OF REPORT:  TECHNIQUE: MULTIPLANAR, MULTISEQUENCE MAGNETIC RESONANCE IMAGING OF THE BRAIN WAS PERFORMED WITHOUT AND THEN WITH INTRAVENOUS CONTRAST.    COMPARISON: CORRELATION IS WITH CT STUDY DATED 2023-03-23 16:31:18.    CLINICAL HISTORY: HISTORY OF BRAIN METS.    Findings:  Hemorrhage: No acute intracranial hemorrhage is identified.  Stroke: No abnormal signal is identified on the diffusion images to suggest acute infarct.  Extra axial spaces: The ventricles and sulci and basal cisterns all appear mildly prominent consistent with global cerebral atrophy.  Cerebral, cerebellar, and brainstem parenchyma: Mild white matter signal abnormalities are seen. The main consideration is small vessel ischemic changes in a patient of this age. There is mild leptomeningeal enhancement of bilateral tentorium and in the superior cerebellar vermis (series 10, image 19 and series 11, image 13), faintly appreciated on the prior CT. There is also mild enhancement in the inferior aspect of the cerebellum bilaterally (series 10, image 25). There is a small dural based enhancing lesion in the posterior aspect of the left superior cerebellum (series 12, image 22). The lesion is isointense to the cortex  on T1 and T2 weighted sequence and is not appreciated on the CT examination. 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  Herniation: None.  Calvarium: Within normal limits.  Vascular system: Normal flow voids.  Visualized paranasal sinuses: Normal.  Visualized orbits: The visualized orbits appear unremarkable.  Sella and skull base: Normal.  Temporal bones and mastoids: Within normal limits.      Impression:  1. No acute intracranial hemorrhage is identified.  2. No abnormal signal is identified on the diffusion images to suggest acute infarct.  3. There is mild leptomeningeal enhancement of bilateral tentorium and in the superior cerebellar vermis (series 10, image 19 and series 11, image 13), faintly appreciated on the prior CT. There is also mild enhancement in the inferior aspect of the cerebellum bilaterally (series 10, image 25). There is a small dural based enhancing lesion in the posterior aspect of the left superior cerebellum (series 12, image 22). The lesion is isointense to the cortex on T1 and T2 weighted sequence and is not appreciated on the CT examination. 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  4. Details and findings as above.                                         CTA Chest Non-Coronary (PE Studies) (Final result)  Result time 03/23/23 16:48:06      Final result by Leoncio Valente MD (03/23/23 16:48:06)                   Impression:      No pulmonary embolism seen    Diffuse edema and soft tissue swelling in the right shoulder and the right chest    Right-sided pleural effusion right lower lobe atelectasis      Electronically signed by: Leoncio Valente  Date:    03/23/2023  Time:    16:48               Narrative:    EXAMINATION:  CTA CHEST NON CORONARY (PE STUDIES)    CLINICAL HISTORY:  Pulmonary embolism (PE) suspected, high prob;    TECHNIQUE:  Low dose axial images, sagittal and coronal  reformations were obtained from the thoracic inlet to the lung bases following the IV administration of contrast..  Contrast timing was optimized to evaluate the pulmonary arteries.  MIP images were performed.  Automated exposure control was utilized    COMPARISON:  None    FINDINGS:  There is a large right-sided pleural effusion.  There is right lower lobe atelectasis.  No pneumothorax is seen.    No pulmonary embolism is seen.    There is diffuse soft swelling and edema seen in the right shoulder the right axilla and the right anterolateral chest wall.  The right breast is enlarged and there is areas of skin thickening and soft tissue edema seen in the right breast.    Fluid and edema seen along the right lateral chest wall.                                       CT Head With Contrast (Final result)  Result time 03/23/23 16:44:01   Procedure changed from CT Head W Wo Contrast     Final result by Janice Bradshaw MD (03/23/23 16:44:01)                   Impression:      Cerebellar metastases seen on comparison MRI dated 01/31/2023 are not definitely visualized.  No new acute abnormality identified.      Electronically signed by: Janice Bradshaw  Date:    03/23/2023  Time:    16:44               Narrative:    EXAMINATION:  CT HEAD WITH CONTRAST    CLINICAL HISTORY:  Metastatic disease;    TECHNIQUE:  Axial scans were obtained from skull base to the vertex with contrast.    Coronal and sagittal reconstructions obtained from the axial data.    Automatic exposure control was utilized to limit radiation dose.    Radiation Dose:    Total DLP: 18 70 mGy*cm    COMPARISON:  CT head dated 03/23/2023, MRI brain dated 09/31/2023    FINDINGS:  There is no acute intracranial hemorrhage or edema. The gray-white matter differentiation is preserved.  The enhancing cerebellar lesions seen on comparison MRI dated 01/31/2023 are not visualized.  There is no definite new enhancing intracranial lesion identified.    There is no mass  effect or midline shift. The ventricles and sulci are normal in size. The basal cisterns are patent. There is no abnormal extra-axial fluid collection.    The calvarium and skull base are intact. The visualized paranasal sinuses and the mastoid air cells are clear.                                       X-Ray Chest PA And Lateral (Final result)  Result time 03/23/23 11:03:07      Final result by Yoav Bunch MD (03/23/23 11:03:07)                   Impression:      No acute cardiopulmonary process.      Electronically signed by: Yoav Bunch  Date:    03/23/2023  Time:    11:03               Narrative:    EXAMINATION:  XR CHEST PA AND LATERAL    CLINICAL HISTORY:  Shortness of Breath;    TECHNIQUE:  Two views of the chest    COMPARISON:  01/23/2023    FINDINGS:  No focal opacification, pleural effusion, or pneumothorax.    The cardiomediastinal silhouette is within normal limits.    Left-sided MediPort remains.    No acute osseous abnormality.                                       CT Head Without Contrast (Final result)  Result time 03/23/23 10:15:46      Final result by Ryley Reno MD (03/23/23 10:15:46)                   Impression:      No acute intracranial findings identified.      Electronically signed by: Ryley Reno  Date:    03/23/2023  Time:    10:15               Narrative:    EXAMINATION:  CT HEAD WITHOUT CONTRAST    CLINICAL HISTORY:  Mental status change, unknown cause;    TECHNIQUE:  Sequential axial images were performed of the brain without contrast.    Dose product length of 878 mGycm. Automated exposure control was utilized to minimize radiation dose.    COMPARISON:  MRI brain January 19, 2023.    FINDINGS:  Previous MRI brain exam was remarkable for multiple cerebellar enhancing metastatic lesions.  These are difficult to assess on this CT exam without contrast. There is no intracranial mass effect, midline shift, hydrocephalus or hemorrhage. There is no sulcal effacement or low  attenuation changes to suggest recent large vessel territory infarction.  There is no acute extra axial fluid collection. Visualized paranasal sinuses are clear without mucosal thickening, polypoidal abnormality or air-fluid levels. Mastoid air cells aeration is optimal.                                       Medications   lactated ringers bolus 1,000 mL (0 mLs Intravenous Stopped 3/23/23 1639)   albuterol nebulizer solution 2.5 mg (2.5 mg Nebulization Given 3/23/23 1516)   HYDROmorphone (PF) injection 1 mg (1 mg Intravenous Given 3/23/23 1637)   ondansetron injection 4 mg (4 mg Intravenous Given 3/23/23 1637)   iopamidoL (ISOVUE-370) injection 100 mL (100 mLs Intravenous Given 3/23/23 1637)   furosemide injection 80 mg (80 mg Intravenous Given 3/23/23 1825)   cefTRIAXone (ROCEPHIN) 1 g in dextrose 5 % in water (D5W) 5 % 50 mL IVPB (MB+) (0 g Intravenous Stopped 3/23/23 1850)   dexAMETHasone tablet 8 mg (8 mg Oral Given 3/23/23 1824)   dexAMETHasone tablet 8 mg (8 mg Oral Given 3/26/23 0843)   dexAMETHasone injection 8 mg (8 mg Intravenous Given 3/23/23 2207)   OLANZapine tablet 5 mg (5 mg Oral Given 3/25/23 2032)   gadobenate dimeglumine (MULTIHANCE) injection 20 mL (20 mLs Intravenous Given 3/24/23 0500)   iopamidoL (ISOVUE-370) injection 100 mL (100 mLs Intravenous Given 3/24/23 0508)   sodium polystyrene sulfonate 15 gram/60 mL 0.25 g/mL oral liquid 15 g (15 g Oral Given 3/25/23 1204)   insulin regular injection 5 Units 0.05 mL (5 Units Intravenous Given 3/25/23 1312)   dextrose 50% injection 25 g (25 g Intravenous Given 3/25/23 1230)     Medical Decision Making:   History:   I obtained history from: someone other than patient.       <> Summary of History: Patient with past medical history of breast cancer with metastasis, family at bedside, sent from office for worsening confusion.  Old Medical Records: I decided to obtain old medical records.  Old Records Summarized: records from clinic visits, records from  previous admission(s), other records and records from another hospital.       <> Summary of Records: RUE  3/15 There was no evidence of deep or superficial vein thrombosis in the right upper extremity.  Initial Assessment:   Patient currently alert, moving all extremities,  Differential Diagnosis:   Judging by the patient's chief complaint and pertinent history, the patient has the following possible differential diagnoses, including but not limited to the following.  Some of these are deemed to be lower likelihood and some more likely based on my physical exam and history combined with possible lab work and/or imaging studies.   Please see the pertinent studies, and refer to the HPI.  Some of these diagnoses will take further evaluation to fully rule out, perhaps as an outpatient and the patient was encouraged to follow up when discharged for more comprehensive evaluation.    Metabolic abnormality, intoxication, toxic ingestion, CVA, infection, structural (SAH, ICH, trauma, neoplastic), seizure/postictal, polypharmacy     Independently Interpreted Test(s):   I have ordered and independently interpreted X-rays - see prior notes.  I have ordered and independently interpreted EKG Reading(s) - see prior notes  Clinical Tests:   Lab Tests: Ordered and Reviewed  Radiological Study: Ordered and Reviewed  Medical Tests: Reviewed and Ordered  ED Management:  Patient is a 57-year-old female with past medical history breast cancer with metastasis, currently undergoing chemotherapy last riding yesterday presents to the emergency department for confusion, persistent right upper extremity swelling, lymphedema, tachycardia.  See HPI.  See physical exam.  CT of the head without any new lesions.  Chest x-ray with right-sided pleural effusion likely malignant.  Lab work as noted.  Patient is still markedly fatigued, not back to her baseline per family, discussed with medicine for admission.  Will obtain MRI.  All results discussed  with patient and family.  Answered all questions at this time.  Verbalized understanding agreed to plan.  Other:   I have discussed this case with another health care provider.        Scribe Attestation:   Scribe #1: I performed the above scribed service and the documentation accurately describes the services I performed. I attest to the accuracy of the note.    Attending Attestation:           Physician Attestation for Scribe:  Physician Attestation Statement for Scribe #1: I, Jakub Clemens MD, reviewed documentation, as scribed by Zoë Lima in my presence, and it is both accurate and complete.           ED Course as of 04/10/23 0930   Thu Mar 23, 2023   [facility] medicine paged. [AS]      ED Course User Index  [AS] Alanna Lima             Medical Decision Making  Problems Addressed:  Generalized weakness: acute illness or injury that poses a threat to life or bodily functions  Metastatic malignant neoplasm, unspecified site: acute illness or injury that poses a threat to life or bodily functions  Pleural effusion: acute illness or injury that poses a threat to life or bodily functions  Tachycardia: acute illness or injury that poses a threat to life or bodily functions    Amount and/or Complexity of Data Reviewed  Labs: ordered.  Radiology: ordered and independent interpretation performed.  ECG/medicine tests: ordered and independent interpretation performed.    Risk  Parenteral controlled substances.  Decision regarding hospitalization.         Clinical Impression:   Final diagnoses:  [R53.1] Generalized weakness  [R00.0] Tachycardia (Primary)  [J90] Pleural effusion  [C79.9] Metastatic malignant neoplasm, unspecified site        ED Disposition Condition    Admit Stable                Jakub Clemens MD  04/10/23 4916

## 2023-03-24 NOTE — NURSING
Nurses Note -- 4 Eyes      3/24/2023   4:57 AM      Skin assessed during: Admit      [x] No Pressure Injuries Present    []Prevention Measures Documented      [] Yes- Altered Skin Integrity Present or Discovered   [] LDA Added if Not in Epic (Describe Wound)   [] New Altered Skin Integrity was Present on Admit and Documented in LDA   [] Wound Image Taken    Wound Care Consulted? No    Attending Nurse:  Josefina Constantino RN     Second RN/Staff Member:  [place] Rn

## 2023-03-24 NOTE — PLAN OF CARE
03/24/23 1407   Discharge Assessment   Assessment Type Discharge Planning Assessment   Confirmed/corrected address, phone number and insurance Yes   Confirmed Demographics Correct on Facesheet  (pt will be staying with sommer Feliz at [address] [place] [id number])   When was your last doctors appointment?   (pt states January was last appointment)   Reason For Admission cancer related pain   People in Home significant other   Do you expect to return to your current living situation?   (Pt plans to stay with daughter in [place])   Do you have help at home or someone to help you manage your care at home? Yes   Who are your caregiver(s) and their phone number(s)? sommer Feliz, [phone]   Equipment Currently Used at Home none   Do you currently have service(s) that help you manage your care at home? No   Who is going to help you get home at discharge? Trini   How do you get to doctors appointments? car, drives self;family or friend will provide   Are you on dialysis? No   Do you take coumadin? No   Discharge Plan A Home with family   Discharge Plan B Home with family   DME Needed Upon Discharge  rollator;shower chair   Discharge Plan discussed with: Patient   Discharge Barriers Identified None     Pt lives with boyfriend in [place]. She is currently and plans to stay with sommer Feliz at [address] [place] [id number]. She states she is independent with ADL's prior to admission. She does not have DME. She is not active with a home health agency. Will follow for discharge needs.

## 2023-03-24 NOTE — CONSULTS
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     Present to the ED with complaint of worsening right upper extremity swelling and pain, intermittent dizziness/vertigo shortness of breath and changes in her voice all of which worsening over the past week.  Report over the past 2 weeks she has been experiencing dizziness/vertigo and unsteady gait, denies fall, headache, nausea or vomiting, her pain medication was adjusted from Norco to oxycodone yesterday, no other medication changes.  Report she feels short of breath at rest but denies cough, fever or chills.  Family report that she was more sleepy and forgetful over the past 2 days.  For her right upper extremity lymphedema she had a venous ultrasound done on 03/15/2023 that showed no evidence of deep venous thrombosis.    CTA chest show no evidence of pulmonary embolism, show right-sided moderate to large pleural effusion with right lower lobe atelectasis, diffuse edema involving right shoulder/axilla/right anterolateral chest wall.      CT head with and without contrast did not show any acute or chronic findings, previously seen cerebellar metastatic lesions on MRI 01/31/2023 are not visualized.    I was scheduled to see the patient on the upcoming Monday to discuss palliative radiation to the breast and axilla with the intention of decreasing the edema and pain in the right arm and chest wall.      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  Diagnosed 12/30/2022:  Ultrasound-guided core needle biopsies of right  breast and axillary mass showed invasive ductal carcinoma, grade 3, ER/LA/HER2 negative Ki67 65%  PET scan 01/26/2023: diffuse FDG uptake throughout the right breast, overlying skin, right axillary lymph nodes, right chest wall musculature and bilateral cervical lymph nodes, diffuse osseous metastatic disease  MRI brain 01/19/2023:  Multiple enhancing nodule in the posterior fossa with mild surrounding edema as well as enhancing bone marrow lesion in the occipital bone  Completed brain radiation in February 2023  Started Carboplatin/gemcitabine C1D1 - 1/31/2023  Started zoledronic acid 03/01/2023  Right upper extremity lymphedema  Cancer-related pain     Past Surgical History            Past Surgical History:   Procedure Laterality Date    PLACEMENT, MEDIPORT N/A 01/23/2023     Procedure: Placement, Mediport;  Surgeon: Silva Shields MD;  Location: [facility];  Service: General;  Laterality: N/A;    TUBAL LIGATION             Social History      Social History            Tobacco Use    Smoking status: Never    Smokeless tobacco: Never   Substance Use Topics    Alcohol use: Yes       Comment: occassionally         Family History   Reviewed and negative     Allergies   Patient has no known allergies.     Home Medications     Physical Exam   Vital Signs  Temp:  [97.2 °F (36.2 °C)-98.7 °F (37.1 °C)]   Pulse:  [105-131]   Resp:  [16-34]   BP: (119-144)/(62-95)   SpO2:  [98 %-100 %]    General: Appears comfortable  HEENT: NC/AT  Neck:  Palpable bilateral cervical lymph node  Chest:  Diminished breath sound at the right base, diffuse right chest wall lymphedema and diffuse right upper extremity tense lymphedema  CVS: Regular rhythm. Normal S1/S2. No pedal edema  Abdomen: nondistended, normoactive BS, soft and non-tender.  MSK: No obvious deformity or joint swelling  Skin: Warm and dry  Neuro: AAOx3, no focal neurological deficit  Psych: Cooperative        Assessment & Plan   The pateint has metastatic breast cancer status  post radiosurgery to 4 brain metastases.  She now has symptomatic right arm and chest wall edema from her locally advanced breast cancer and adenopathy.  We discussed palliative radiation to the right breast and axilla.  The patient is in agreement with the plan.  I will simulate her today since she is in the hospital; her radiation will start sometime next week as an outpatient.

## 2023-03-24 NOTE — PLAN OF CARE
Problem: Physical Therapy  Goal: Physical Therapy Goal  Description: Goals to be met by: 23     Patient will increase functional independence with mobility by performin. Supine to sit with Bowie  2. Sit to supine with Bowie  3. Sit to stand transfer with Modified Bowie  4. Gait  x 200 feet with Modified Bowie using Rolling Walker.     Outcome: Ongoing, Progressing

## 2023-03-24 NOTE — PROGRESS NOTES
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     Present to the ED with complaint of worsening right upper extremity swelling and pain, intermittent dizziness/vertigo shortness of breath and changes in her voice all of which worsening over the past week.  History taken from patient and her family at bedside.  Report over the past 2 weeks she has been experiencing dizziness/vertigo and unsteady gait, denies fall, headache, nausea or vomiting, her pain medication was adjusted from Norco to oxycodone yesterday, no other medication changes.  Report she feels short of breath at rest but denies cough, fever or chills.  Family report that she was more sleepy and forgetful over the past 2 days.  For her right upper extremity lymphedema she had a venous ultrasound done on 03/15/2023 that showed no evidence of deep venous thrombosis.     On arrival to ED she was afebrile, tachycardic, normotensive and saturating 100% on room air.  EKG shows sinus tachycardia.       CTA chest show no evidence of pulmonary embolism, show right-sided moderate to large pleural effusion with right lower lobe atelectasis, diffuse edema involving right shoulder/axilla/right anterolateral chest wall.      CT head with and without contrast did not show any acute or chronic findings, previously seen cerebellar metastatic lesions on MRI 01/31/2023 are not visualized.     Labs notable for WBC 13.9, hemoglobin 8.3, platelets 467, normal BNP and negative troponin, urinalysis show bacteriuria..     She was given analgesics, Lasix 80 mg IV, ceftriaxone and  referred to hospital medicine service for further evaluation and management.    Interval Hx:   Patient was seen and examined at bedside, family at bedside, continue to be having rt arm pain and swelling, no fever.    Chart was reviewed, HDS,Anemia ,Mild transaminitis on labs    Objective/physical exam:(limited this morning)  General: In no acute distress, afebrile  Chest: Clear to auscultation bilaterally  Heart: RRR, +S1, S2, no appreciable murmur  Abdomen: Soft, nontender, BS +  MSK: Warm, no lower extremity edema, no clubbing or cyanosis, Rt UE edema+ve  Neurologic: Alert and oriented x4, Cranial nerve II-XII intact, Strength 5/5 in all 4 extremities    VITAL SIGNS: 24 HRS MIN & MAX LAST   Temp  Min: 97.2 °F (36.2 °C)  Max: 97.9 °F (36.6 °C) 97.5 °F (36.4 °C)   BP  Min: 109/61  Max: 144/62 109/61   Pulse  Min: 99  Max: 131  102   Resp  Min: 18  Max: 34 20   SpO2  Min: 96 %  Max: 100 % 96 %       Recent Labs   Lab 03/22/23  1049 03/23/23  0950 03/24/23  0646   WBC 14.1* 13.9* 7.6   RBC 2.96* 3.03* 2.88*   HGB 8.4* 8.3* 8.0*   HCT 27.8* 27.0* 25.3*   MCV 93.9 89.1 87.8   MCH 28.4 27.4 27.8   MCHC 30.2* 30.7* 31.6*   RDW 16.5 16.7 16.9   * 467* 388   MPV 9.1 9.8 10.0       Recent Labs   Lab 03/22/23  1049 03/23/23  0950 03/24/23  0646    138 137   K 4.7 4.6 4.6   CO2 29 26 31*   BUN 8.0* 10.7 14.2   CREATININE 0.72 0.79 0.79   CALCIUM 9.5 9.8 9.3   MG  --   --  2.00   ALBUMIN 2.8* 3.0* 3.0*   ALKPHOS 175* 163* 144   ALT 83* 86* 98*   AST 59* 60* 76*   BILITOT 0.2 0.4 0.4          Microbiology Results (last 7 days)       ** No results found for the last 168 hours. **             See below for Radiology    Scheduled Med:   dexAMETHasone  8 mg Oral Daily    docusate sodium  100 mg Oral BID    enoxaparin  40 mg Subcutaneous Daily    morphine  15 mg Oral BID    OLANZapine  5 mg Oral QHS    polyethylene glycol  17 g Oral Daily        Continuous Infusions:       PRN Meds:  acetaminophen, acetaminophen,  albuterol-ipratropium, aluminum-magnesium hydroxide-simethicone, meclizine, melatonin, ondansetron, oxyCODONE, polyethylene glycol, prochlorperazine, senna-docusate 8.6-50 mg, simethicone, sodium chloride 0.9%       Assessment/Plan:  AHRF  Right UE lymphedema   Stage IV Triple Negative Breast Cancer - Mets to bone and brain  Chemotherapy induced anemia and neutropenia - last chemo 3/22/23  Voice changes  Dizziness,History of XRT to brain mets  Anemia, unspecified    Plan:  Wean O2 as tolerated, plan to consider thora per family, will f/u Oncology  Oncology following, Radiation oncology for palliative XRT   Needs Wedge for arm to keep elevated  Pain meds to be continued as needed, monitor Bowel Movements  MRI Brain was reviewed-decrease in cerebral mets, 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, Oncology following, will appreciate recs, on Dexamethasone  PT/OT to continue  Home Medications for Chronic Medical Problems reviewed and resumed as needed      Critical care Time Spent>35 min   [facility]    VTE prophylaxis: Lovenox    Patient condition:  guarded    Anticipated discharge and Disposition:         All diagnosis and differential diagnosis have been reviewed; assessment and plan has been documented; I have personally reviewed the labs and test results that are presently available; I have reviewed the patients medication list; I have reviewed the consulting providers response and recommendations. I have reviewed or attempted to review medical records based upon their availability    All of the patient's questions have been  addressed and answered. Patient's is agreeable to the above stated plan. I will continue to monitor closely and make adjustments to medical management as needed.  _____________________________________________________________________    Nutrition Status:    Radiology:  MRI Brain W WO Contrast  Narrative: EXAMINATION:  MRI BRAIN W WO CONTRAST    CLINICAL HISTORY:  Brain/CNS neoplasm, assess  treatment response;Brain metastases, monitor;    TECHNIQUE:  Multiplanar, multisequence MR images of the brain were obtained with and without administration of intravenous contrast.    COMPARISON:  MRI brain dated 01/31/2023    FINDINGS:  Evaluation is limited by motion artifact.  There is no restricted diffusion.  The cerebellar parenchymal nodules have decreased in size and number.  Residual nodule along the left posterior cerebellar convexity now measures 10 mm compared to 16 mm previously (series 10, image 22).  There is new leptomeningeal enhancement predominantly seen in the posterior fossa, but also possibly in the bilateral anterior frontal and temporal lobes.    There is no significant mass effect or midline shift.  The basal cisterns are patent.  There is no hydrocephalus or abnormal extra-axial fluid collection.  The major intracranial flow voids are patent.  There is trace scattered paranasal sinus mucosal thickening.  Scattered enhancing bone marrow lesions are redemonstrated.  Impression: 1. Interval decrease in size and number of enhancing cerebellar metastases.  2. New leptomeningeal enhancement, predominantly in the posterior fossa.  Findings may be treatment related, however metastatic disease and infection are in the differential.  No major change from the [facility] interpretation.    Electronically signed by: Janice Bradshaw  Date:    03/24/2023  Time:    10:38  CT Soft Tissue Neck With Contrast  Narrative: EXAMINATION:  CT SOFT TISSUE NECK WITH CONTRAST    CLINICAL HISTORY:  Lymphadenopathy, neck;    TECHNIQUE:  Axial CT images of the neck were obtained after intravenous contrast. Reformatted images in the sagittal and coronal plane were included.    Automatic exposure control was utilized to limit radiation dose.    DLP: 431 mGy-cm    COMPARISON:  None.    FINDINGS:  The airways are patent.  There are mildly enlarged bilateral cervical lymph nodes, right greater than left.  Heterogeneous right  upper cervical lymph node measures 1.2 cm in short axis (series 2, image 42).  A left supraclavicular lymph node measures 1.2 cm in short axis (series 2, image 61).  There is subcutaneous edema in the right lower neck and anterior chest wall.  There is no drainable fluid collection identified.    The parotid glands, submandibular glands and thyroid gland are unremarkable.  The major vessels in the neck enhance normally.  There is no acute abnormality of the orbits or visualized brain parenchyma.  There is no destructive bone lesion.  Right pleural effusion is noted with overlying dependent airspace consolidation.  Impression: 1. Bilateral cervical lymphadenopathy, right greater than left.  2. Subcutaneous edema in the right lower neck and chest wall.  No drainable fluid collection identified.  No significant change from the [facility] interpretation.    Electronically signed by: Janice Bradshaw  Date:    03/24/2023  Time:    09:45      Leida Alvarez MD   03/24/2023

## 2023-03-24 NOTE — CONSULTS
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 CT scan x2 in ER without evidence of abnormality. She is currently 2 days out for C3D1 of [place]. Oncology consulted for further evaluation.     Today she was seen and evaluated. She actually looks better than she did in clinic 2 days ago. She does have voice changes which is new since I saw her in clinic, her lymphedema to her R hand/forearm is better but her chest/neck is now worse. She was previously planned to see radiation oncology on Monday for palliative XRT, but will ask them to see her today.       From my Clinic Note  58yo F who presented in January 2023 for evaluation of R Breast Cancer. She noticed R breast skin changes in December '22 just prior to her already planned annual MMG. Her breast changes including skin changes, darkening, nipple retraction, and breast firmness has developed rapidly over the last month since diagnosis - made worse after her MMG and Breast MRI. She was found to have extensive R breast and axilla abnormality with breast imaging with MMG/US and Breast MRI. 1/26/23 staing PET with evidence of Diffuse FDG uptake throughout the right breast with overlying skin thickening, right axillary lymphadenopathy and uptake in the right chest wall musculature.Bilateral hypermetabolic cervical  lymphadenopathy.Diffuse hypermetabolic osseous metastatic disease. She then underwent Brain MRI which showed evidence of metastatic disease.   Since that time she has begun chemotherapy for her metastatic Triple negative breast cancer. She began Carboplatin + Gemcitabine in February 2023. She underwent brain radiation for her known brain mets in February '23.  Will be starting zometa for her osseous disease in March '23.     Oncology Treatment Plan:   OP BREAST GEMCITABINE CARBOPLATIN Q3W    Medications:  Continuous Infusions:    Scheduled Meds:   dexAMETHasone  8 mg Oral Daily    docusate sodium  100 mg Oral BID    enoxaparin  40 mg Subcutaneous Daily    morphine  15 mg Oral BID    OLANZapine  5 mg Oral QHS    polyethylene glycol  17 g Oral Daily     PRN Meds:acetaminophen, acetaminophen, albuterol-ipratropium, aluminum-magnesium hydroxide-simethicone, meclizine, melatonin, ondansetron, oxyCODONE, polyethylene glycol, prochlorperazine, senna-docusate 8.6-50 mg, simethicone, sodium chloride 0.9%     Review of patient's allergies indicates:  No Known Allergies     Past Medical History:   Diagnosis Date    Breast cancer     Hyperlipidemia     Knee pain     Menopausal flushing     Menopause 3/2018    Normocytic normochromic anemia     Vitamin D deficiency      Past Surgical History:   Procedure Laterality Date    PLACEMENT, MEDIPORT N/A 01/23/2023    Procedure: Placement, Mediport;  Surgeon: Silva Shields MD;  Location: [facility];  Service: General;  Laterality: N/A;    TUBAL LIGATION       Family History       Problem Relation (Age of Onset)    Arthritis Mother    Diabetes Mother    Hypertension Mother    Lung cancer Father (62)          Tobacco Use    Smoking status: Never    Smokeless tobacco: Never   Substance and Sexual Activity    Alcohol use: Yes     Comment: occassionally    Drug use: Never    Sexual activity: Yes     Partners: Male     Birth control/protection: None       Review of Systems   Constitutional:   Negative for appetite change and unexpected weight change.   HENT:  Negative for mouth sores.    Eyes:  Negative for visual disturbance.   Respiratory:  Negative for cough and shortness of breath.    Cardiovascular:  Negative for chest pain.   Gastrointestinal:  Negative for abdominal pain and diarrhea.   Genitourinary:  Negative for frequency.   Musculoskeletal:  Positive for back pain.   Integumentary:  Negative for rash.   Neurological:  Negative for headaches.   Hematological:  Positive for adenopathy.   Psychiatric/Behavioral:  The patient is not nervous/anxious.      Objective:     Vital Signs (Most Recent):  Temp: 97.9 °F (36.6 °C) (03/24/23 0326)  Pulse: 99 (03/24/23 0326)  Resp: 20 (03/24/23 0854)  BP: 112/64 (03/24/23 0326)  SpO2: 96 % (03/24/23 0326) Vital Signs (24h Range):  Temp:  [97.2 °F (36.2 °C)-98.7 °F (37.1 °C)] 97.9 °F (36.6 °C)  Pulse:  [] 99  Resp:  [16-34] 20  SpO2:  [96 %-100 %] 96 %  BP: (112-144)/(62-95) 112/64     Weight: 112.7 kg (248 lb 7.3 oz)  Body mass index is 41.35 kg/m².  Body surface area is 2.27 meters squared.      Intake/Output Summary (Last 24 hours) at 3/24/2023 0927  Last data filed at 3/24/2023 0604  Gross per 24 hour   Intake 1290 ml   Output 1 ml   Net 1289 ml     Physical Exam  Constitutional:       General: She is not in acute distress.     Appearance: Normal appearance. She is not ill-appearing.   HENT:      Head: Normocephalic and atraumatic.      Nose: Nose normal.      Mouth/Throat:      Mouth: Mucous membranes are moist.      Pharynx: Oropharynx is clear.   Eyes:      Extraocular Movements: Extraocular movements intact.      Conjunctiva/sclera: Conjunctivae normal.   Pulmonary:      Effort: Pulmonary effort is normal. No respiratory distress.   Musculoskeletal:         General: No swelling.      Right lower leg: No edema.      Left lower leg: No edema.   Lymphadenopathy:      Comments: RUE lymphedema   Skin:     General: Skin is dry.      Coloration: Skin is  not jaundiced.   Neurological:      General: No focal deficit present.      Mental Status: She is alert and oriented to person, place, and time. Mental status is at baseline.     Chest - Right breast with skin erythema, thickening, and dimpling consistent with inflammatory changes throughout the entire breast. Breast is firm throughout. Nipple retraction. Small area of skin breakdown below nipple.  Right axillary and R supraclavicular LAD palpated. No abnormality noted in left breast or axilla.     Significant Labs:   CBC:   Recent Labs   Lab 03/22/23  1049 03/23/23  0950 03/24/23  0646   WBC 14.1* 13.9* 7.6   HGB 8.4* 8.3* 8.0*   HCT 27.8* 27.0* 25.3*   * 467* 388    and CMP:   Recent Labs   Lab 03/22/23  1049 03/23/23  0950 03/24/23  0646    138 137   K 4.7 4.6 4.6   CO2 29 26 31*   BUN 8.0* 10.7 14.2   CREATININE 0.72 0.79 0.79   CALCIUM 9.5 9.8 9.3   ALBUMIN 2.8* 3.0* 3.0*   BILITOT 0.2 0.4 0.4   ALKPHOS 175* 163* 144   AST 59* 60* 76*   ALT 83* 86* 98*       Diagnostic Results:  Impression:  Cerebellar metastases seen on comparison MRI dated 01/31/2023 are not definitely visualized.  No new acute abnormality identified.    Assessment/Plan:     Stage IV Triple Negative Breast Cancer - Mets to bone and brain  Chemotherapy induced anemia and neutropenia - last chemo 3/22/23  Right UE lymphedema due to disease  History of XRT to brain mets  Voice changes  Dizziness    - Will consult radiation oncology for palliative XRT consideration given extensive RUE lymphedema now spreading to her chest and neck  - Follow up MRI brain  - PT/OT ordered  - Needs Wedge for arm to keep elevated  - Continue pain medication and anti emetics      Thank you for your consult. Dr. García is on call over the weekend.     Elizabeth K Lejeune, MD  Hematology/Oncology  [facility] - Oncology [facility]

## 2023-03-24 NOTE — PT/OT/SLP EVAL
Physical Therapy Evaluation    Patient Name:  Esme Marie   MRN:  [id number]    Recommendations:     Discharge Recommendations:  (TBD)   Discharge Equipment Recommendations: walker, rolling   Barriers to discharge:  acuity of illness    Assessment:     Esme Marie is a 57 y.o. female admitted with a medical diagnosis of Cancer related pain. Patient with stage IV breast cancer with brain metastasis and diffuse osseus metastatic disease. Patient reports living with daughter in 1st-floor apartment. She is usually independent. She presents with the following impairments/functional limitations: weakness, impaired endurance, impaired self care skills, impaired functional mobility, gait instability, impaired balance, decreased upper extremity function, decreased lower extremity function, decreased coordination. She required MIN A for bed mobility. MOD A for sit<>stand. Only able to ambulate ~5 ft with RW & MOD A. Ataxic gait pattern. Discharge recommendations will depend on patient progress.     Rehab Prognosis: Fair; patient would benefit from acute skilled PT services to address these deficits and reach maximum level of function.    Recent Surgery: * No surgery found *      Plan:     During this hospitalization, patient to be seen 5 x/week to address the identified rehab impairments via gait training, therapeutic activities, therapeutic exercises, neuromuscular re-education and progress toward the following goals:    Plan of Care Expires:  04/24/23    Subjective     Chief Complaint: pain  Patient/Family Comments/goals: none  Pain/Comfort:  Pain Rating 1: 5/10  Location - Side 1: Right  Location 1:  (breast)  Pain Addressed 1: Distraction, Reposition  Pain Rating Post-Intervention 1: 5/10    Patients cultural, spiritual, Taoist conflicts given the current situation: no    Living Environment:  Lives with daughter in 1st floor apartment.   Prior to admission, patients level of function was independent.   Equipment used at home: none.  DME owned (not currently used): none.  Upon discharge, patient will have assistance from TBD.    Objective:     Communicated with nurse prior to session.  Patient found supine with telemetry  upon PT entry to room.    General Precautions: Standard, fall  Orthopedic Precautions:N/A   Braces: N/A  Respiratory Status: Room air    Exams:  Cognitive Exam:  Patient is oriented to Person, Place, Time, and Situation  Sensation:    -       Intact  RLE ROM: WFL  RLE Strength: -4/5 grossly  LLE ROM: WFL  LLE Strength: -4/5 grossly    Functional Mobility:  Bed Mobility:     Supine to Sit: minimum assistance  Transfers:     Sit to Stand:  moderate assistance with rolling walker  Gait: 5 ft with RW & MOD A   Balance: poor      AM-PAC 6 CLICK MOBILITY  Total Score:14     Patient left up in chair with all lines intact, call button in reach, and family present.    GOALS:   Multidisciplinary Problems       Physical Therapy Goals          Problem: Physical Therapy    Goal Priority Disciplines Outcome Goal Variances Interventions   Physical Therapy Goal     PT, PT/OT Ongoing, Progressing     Description: Goals to be met by: 23     Patient will increase functional independence with mobility by performin. Supine to sit with [place]  2. Sit to supine with [place]  3. Sit to stand transfer with Modified [place]  4. Gait  x 200 feet with Modified [place] using Rolling Walker.                          History:     Past Medical History:   Diagnosis Date    Breast cancer     Hyperlipidemia     Knee pain     Menopausal flushing     Menopause 3/2018    Normocytic normochromic anemia     Vitamin D deficiency        Past Surgical History:   Procedure Laterality Date    PLACEMENT, MEDIPORT N/A 2023    Procedure: Placement, Mediport;  Surgeon: Silva Shields MD;  Location: [facility];  Service: General;  Laterality: N/A;    TUBAL LIGATION         Time Tracking:     PT Received On:  03/24/23  PT Start Time: 1422     PT Stop Time: 1443  PT Total Time (min): 21 min     Billable Minutes: Evaluation 21 minutes      03/24/2023

## 2023-03-24 NOTE — H&P
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    Present to the ED with complaint of worsening right upper extremity swelling and pain, intermittent dizziness/vertigo shortness of breath and changes in her voice all of which worsening over the past week.  History taken from patient and her family at bedside.  Report over the past 2 weeks she has been experiencing dizziness/vertigo and unsteady gait, denies fall, headache, nausea or vomiting, her pain medication was adjusted from Norco to oxycodone yesterday, no other medication changes.  Report she feels short of breath at rest but denies cough, fever or chills.  Family report that she was more sleepy and forgetful over the past 2 days.  For her right upper extremity lymphedema she had a venous ultrasound done on 03/15/2023 that showed no evidence of deep venous thrombosis.    On arrival to ED she was afebrile, tachycardic, normotensive and saturating 100% on room air.  EKG shows sinus tachycardia.      CTA chest show no evidence of pulmonary embolism, show right-sided moderate to large pleural effusion with right lower lobe atelectasis, diffuse edema involving right shoulder/axilla/right anterolateral chest wall.     CT head with and without contrast did not show  any acute or chronic findings, previously seen cerebellar metastatic lesions on MRI 01/31/2023 are not visualized.    Labs notable for WBC 13.9, hemoglobin 8.3, platelets 467, normal BNP and negative troponin, urinalysis show bacteriuria..    She was given analgesics, Lasix 80 mg IV, ceftriaxone and referred to hospital medicine service for further evaluation and management.    ROS   Except as documented, all other systems reviewed and negative     Past Medical History   Stage IV triple negative inflammatory right breast carcinoma with brain and diffuse osseous metastasis  Diagnosed 12/30/2022:  Ultrasound-guided core needle biopsies of right breast and axillary mass showed invasive ductal carcinoma, grade 3, ER/VA/HER2 negative Ki67 65%  PET scan 01/26/2023: diffuse FDG uptake throughout the right breast, overlying skin, right axillary lymph nodes, right chest wall musculature and bilateral cervical lymph nodes, diffuse osseous metastatic disease  MRI brain 01/19/2023:  Multiple enhancing nodule in the posterior fossa with mild surrounding edema as well as enhancing bone marrow lesion in the occipital bone  Completed brain radiation in February 2023  Started Carboplatin/gemcitabine C1D1 - 1/31/2023  Started zoledronic acid 03/01/2023  Right upper extremity lymphedema  Cancer-related pain    Past Surgical History     Past Surgical History:   Procedure Laterality Date    PLACEMENT, MEDIPORT N/A 01/23/2023    Procedure: Placement, Mediport;  Surgeon: Silva Shields MD;  Location: [facility];  Service: General;  Laterality: N/A;    TUBAL LIGATION         Social History     Social History     Tobacco Use    Smoking status: Never    Smokeless tobacco: Never   Substance Use Topics    Alcohol use: Yes     Comment: occassionally        Family History   Reviewed and negative    Allergies   Patient has no known allergies.    Home Medications     Prior to Admission medications    Medication Sig Start Date End Date Taking?  Authorizing Provider   dexAMETHasone (DECADRON) 4 MG Tab Take 2 tablets (8 mg total) by mouth every 12 (twelve) hours. 1/20/23   Elizabeth Kennedy Lejeune, MD   dexAMETHasone (DECADRON) 4 MG Tab Take 2 tablets (8 mg total) by mouth once daily. Take as directed on days 2, 3, and 4 of your chemotherapy cycle. 2/13/23   Elizabeth Kennedy Lejeune, MD   docusate sodium (COLACE) 100 MG capsule Take 1 capsule (100 mg total) by mouth 2 (two) times daily. 2/22/23   Elizabeth Kennedy Lejeune, MD   meclizine (ANTIVERT) 25 mg tablet Take 1 tablet (25 mg total) by mouth 3 (three) times daily as needed for Dizziness. 3/22/23   Elizabeth Kennedy Lejeune, MD   morphine (MS CONTIN) 15 MG 12 hr tablet Take 1 tablet (15 mg total) by mouth 2 (two) times daily. 3/21/23   Elizabeth Kennedy Lejeune, MD   naproxen sodium (ANAPROX) 220 MG tablet Take 220 mg by mouth every 12 (twelve) hours. Takes as directed prn    Historical Provider   OLANZapine (ZYPREXA) 5 MG tablet Take 1 tablet (5 mg total) by mouth every evening. Take as directed days 1-4 of each chemotherapy cycle. 1/30/23   Elizabeth Kennedy Lejeune, MD   omega-3 fatty acids/fish oil (FISH OIL-OMEGA-3 FATTY ACIDS) 300-1,000 mg capsule Take by mouth once daily.    Historical Provider   ondansetron (ZOFRAN-ODT) 8 MG TbDL Take 1 tablet (8 mg total) by mouth every 8 (eight) hours as needed (nausea/vomiting). 1/30/23   Elizabeth Kennedy Lejeune, MD   oxyCODONE (ROXICODONE) 10 mg Tab immediate release tablet Take 1 tablet (10 mg total) by mouth every 4 (four) hours as needed for Pain. 3/22/23   Elizabeth Kennedy Lejeune, MD   polyethylene glycol (GLYCOLAX) 17 gram PwPk Take 17 g by mouth once daily. 2/22/23   Elizabeth Kennedy Lejeune, MD   prochlorperazine (COMPAZINE) 10 MG tablet Take 1 tablet (10 mg total) by mouth every 6 (six) hours as needed (for nausea). 2nd choice, can cause drowsiness. 1/27/23 1/27/24  Karime Duran NP   traMADoL (ULTRAM) 50 mg tablet Take 1 tablet (50 mg  total) by mouth every 6 (six) hours. 3/16/23   Elizabeth Kennedy Lejeune, MD   vitamin D (VITAMIN D3) 1000 units Tab Take 5,000 Units by mouth once daily.    Historical Provider        Physical Exam   Vital Signs  Temp:  [97.2 °F (36.2 °C)-98.7 °F (37.1 °C)]   Pulse:  [105-131]   Resp:  [16-34]   BP: (119-144)/(62-95)   SpO2:  [98 %-100 %]    General: Appears comfortable  HEENT: NC/AT  Neck:  Palpable bilateral cervical lymph node  Chest:  Diminished breath sound at the right base, diffuse right chest wall lymphedema and diffuse right upper extremity tense lymphedema  CVS: Regular rhythm. Normal S1/S2. No pedal edema  Abdomen: nondistended, normoactive BS, soft and non-tender.  MSK: No obvious deformity or joint swelling  Skin: Warm and dry  Neuro: AAOx3, no focal neurological deficit  Psych: Cooperative    Labs     Recent Labs     03/22/23  1049 03/23/23  0950   WBC 14.1* 13.9*   RBC 2.96* 3.03*   HGB 8.4* 8.3*   HCT 27.8* 27.0*   MCV 93.9 89.1   MCH 28.4 27.4   MCHC 30.2* 30.7*   RDW 16.5 16.7   * 467*   ABSNEUT  --  13.066*      Recent Labs     03/22/23  1049 03/23/23  0950    138   K 4.7 4.6   CHLORIDE 100 101   CO2 29 26   BUN 8.0* 10.7   CREATININE 0.72 0.79   EGFRNORACEVR >60 >60   GLUCOSE 145* 144*   CALCIUM 9.5 9.8   ALBUMIN 2.8* 3.0*   GLOBULIN 3.2 3.3   ALKPHOS 175* 163*   ALT 83* 86*   AST 59* 60*   BILITOT 0.2 0.4   BNP  --  24.7     Recent Labs     03/23/23  0950   TROPONINI <0.010        Microbiology Results (last 7 days)       ** No results found for the last 168 hours. **           Imaging     CTA Chest Non-Coronary (PE Studies)   Final Result      No pulmonary embolism seen      Diffuse edema and soft tissue swelling in the right shoulder and the right chest      Right-sided pleural effusion right lower lobe atelectasis         Electronically signed by: Leoncio Valente   Date:    03/23/2023   Time:    16:48      CT Head With Contrast   Final Result      Cerebellar metastases seen on  comparison MRI dated 01/31/2023 are not definitely visualized.  No new acute abnormality identified.         Electronically signed by: Janice Bradshaw   Date:    03/23/2023   Time:    16:44      X-Ray Chest PA And Lateral   Final Result      No acute cardiopulmonary process.         Electronically signed by: Yoav Bunch   Date:    03/23/2023   Time:    11:03      CT Head Without Contrast   Final Result      No acute intracranial findings identified.         Electronically signed by: Ryley Reno   Date:    03/23/2023   Time:    10:15        Assessment & Plan   Stage IV triple negative metastatic right breast carcinoma  Right upper extremity lymphedema -worsening  Right-sided pleural effusion - probably malignant  Voice changes - possible lymphadenopathy affecting laryngeal nerves  Dizziness/vertigo- cerebellar metastasis completed radiation in February 2023  Cancer-related pain  Asymptomatic bacteriuria    Plan:    MRI brain with and without contrast - pending  CT soft tissue neck with contrast - pending  Lymphedema unlikely to improve with Lasix, will hold further diuretics at this time.  RUE elevation  If became hypoxic or requiring oxygen will consider therapeutic thoracentesis  Home medication reviewed and resumed, continue dexamethasone as planned for 3 days after chemo, continue home analgesic regimen.  Notify Oncology  VTE Prophylaxis:  Enoxaparin 40 mg subQ daily    Sabrina Davis MD  Internal Medicine

## 2023-03-24 NOTE — PLAN OF CARE
Pt asking for rollator and shower chair. Referral sent to [facility] via care port. [facility] will deliver upon discharge if approved.

## 2023-03-25 NOTE — PROGRESS NOTES
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     Present to the ED with complaint of worsening right upper extremity swelling and pain, intermittent dizziness/vertigo shortness of breath and changes in her voice all of which worsening over the past week.  History taken from patient and her family at bedside.  Report over the past 2 weeks she has been experiencing dizziness/vertigo and unsteady gait, denies fall, headache, nausea or vomiting, her pain medication was adjusted from Norco to oxycodone yesterday, no other medication changes.  Report she feels short of breath at rest but denies cough, fever or chills.  Family report that she was more sleepy and forgetful over the past 2 days.  For her right upper extremity lymphedema she had a venous ultrasound done on 03/15/2023 that showed no evidence of deep venous thrombosis.     On arrival to ED she was afebrile, tachycardic, normotensive and saturating 100% on room air.  EKG shows sinus tachycardia.       CTA chest show no evidence of pulmonary embolism, show right-sided moderate to large pleural effusion with right lower lobe atelectasis, diffuse edema involving right shoulder/axilla/right anterolateral chest wall.      CT head with and without contrast did not show any acute or chronic findings, previously seen cerebellar metastatic lesions on MRI 01/31/2023 are not visualized.     Labs notable for WBC 13.9, hemoglobin 8.3, platelets 467, normal BNP and negative troponin, urinalysis show bacteriuria..     She was given analgesics, Lasix 80 mg IV, ceftriaxone and  referred to hospital medicine service for further evaluation and management.    Interval Hx:   Patient was seen and examined at bedside, family at bedside, continue to be having rt arm pain and swelling,improving today and feels and looks better, no fever, still on nasal cannula    Chart was reviewed, Hyperkalemia on Labs    Objective/physical exam:(limited this morning)  General: In no acute distress, afebrile  Chest: Clear to auscultation bilaterally  Heart: RRR, +S1, S2, no appreciable murmur  Abdomen: Soft, nontender, BS +  MSK: Warm, no lower extremity edema, no clubbing or cyanosis, Rt UE edema+ve  Neurologic: Alert and oriented x4, Cranial nerve II-XII intact, Strength 5/5 in all 4 extremities    VITAL SIGNS: 24 HRS MIN & MAX LAST   Temp  Min: 97.4 °F (36.3 °C)  Max: 98.1 °F (36.7 °C) 97.7 °F (36.5 °C)   BP  Min: 114/75  Max: 139/82 114/75   Pulse  Min: 71  Max: 114  (!) 114   Resp  Min: 16  Max: 18 18   SpO2  Min: 91 %  Max: 100 % 95 %       Recent Labs   Lab 03/23/23  0950 03/24/23  0646 03/25/23  1007   WBC 13.9* 7.6 3.0*   RBC 3.03* 2.88* 2.93*   HGB 8.3* 8.0* 8.1*   HCT 27.0* 25.3* 26.3*   MCV 89.1 87.8 89.8   MCH 27.4 27.8 27.6   MCHC 30.7* 31.6* 30.8*   RDW 16.7 16.9 16.5   * 388 358   MPV 9.8 10.0 9.7       Recent Labs   Lab 03/22/23  1049 03/23/23  0950 03/24/23  0646 03/25/23  0734    138 137 137   K 4.7 4.6 4.6 5.9*   CO2 29 26 31* 30*   BUN 8.0* 10.7 14.2 15.5   CREATININE 0.72 0.79 0.79 0.72   CALCIUM 9.5 9.8 9.3 9.5   MG  --   --  2.00  --    ALBUMIN 2.8* 3.0* 3.0*  --    ALKPHOS 175* 163* 144  --    ALT 83* 86* 98*  --    AST 59* 60* 76*  --    BILITOT 0.2 0.4 0.4  --           Microbiology Results (last 7 days)       ** No results found for the last 168 hours. **             See below for Radiology    Scheduled Med:   dexAMETHasone  8 mg Oral Daily    docusate sodium  100 mg Oral BID    enoxaparin  40 mg Subcutaneous Daily    morphine  15 mg Oral BID    OLANZapine  5 mg Oral QHS     polyethylene glycol  17 g Oral Daily        Continuous Infusions:       PRN Meds:  acetaminophen, acetaminophen, albuterol-ipratropium, aluminum-magnesium hydroxide-simethicone, meclizine, melatonin, morphine, ondansetron, oxyCODONE, polyethylene glycol, prochlorperazine, senna-docusate 8.6-50 mg, simethicone, sodium chloride 0.9%       Assessment/Plan:  AHRF  Right UE lymphedema   Stage IV Triple Negative Breast Cancer - Mets to bone and brain  Chemotherapy induced anemia and neutropenia - last chemo 3/22/23  Voice changes  Dizziness,History of XRT to brain mets  Anemia, unspecified  Hyperkalemia      Plan:  Wean O2 as tolerated, Oncology plan to consider thora , will f/u Oncology and consult Pulm based on Oncology's opinion  Oncology following, Radiation oncology for palliative XRT   Needs Wedge for arm to keep elevated  Pain meds to be continued as needed, monitor Bowel Movements  MRI Brain was reviewed-decrease in cerebral mets, 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, Oncology following, will appreciate recs, on Dexamethasone  F/u Anemia w/u  Monitor K, giving Kayexalate, IV insulin 5 units with an amp of dextrose, Calcium-9.5  PT/OT to continue  Home Medications for Chronic Medical Problems reviewed and resumed as needed      Critical care Time Spent>35 min   AHRF    VTE prophylaxis: Lovenox    Patient condition:  guarded    Anticipated discharge and Disposition:         All diagnosis and differential diagnosis have been reviewed; assessment and plan has been documented; I have personally reviewed the labs and test results that are presently available; I have reviewed the patients medication list; I have reviewed the consulting providers response and recommendations. I have reviewed or attempted to review medical records based upon their availability    All of the patient's questions have been  addressed and answered. Patient's is agreeable to the above stated plan. I will continue to monitor  closely and make adjustments to medical management as needed.  _____________________________________________________________________    Nutrition Status:    Radiology:  MRI Brain W WO Contrast  Narrative: EXAMINATION:  MRI BRAIN W WO CONTRAST    CLINICAL HISTORY:  Brain/CNS neoplasm, assess treatment response;Brain metastases, monitor;    TECHNIQUE:  Multiplanar, multisequence MR images of the brain were obtained with and without administration of intravenous contrast.    COMPARISON:  MRI brain dated 01/31/2023    FINDINGS:  Evaluation is limited by motion artifact.  There is no restricted diffusion.  The cerebellar parenchymal nodules have decreased in size and number.  Residual nodule along the left posterior cerebellar convexity now measures 10 mm compared to 16 mm previously (series 10, image 22).  There is new leptomeningeal enhancement predominantly seen in the posterior fossa, but also possibly in the bilateral anterior frontal and temporal lobes.    There is no significant mass effect or midline shift.  The basal cisterns are patent.  There is no hydrocephalus or abnormal extra-axial fluid collection.  The major intracranial flow voids are patent.  There is trace scattered paranasal sinus mucosal thickening.  Scattered enhancing bone marrow lesions are redemonstrated.  Impression: 1. Interval decrease in size and number of enhancing cerebellar metastases.  2. New leptomeningeal enhancement, predominantly in the posterior fossa.  Findings may be treatment related, however metastatic disease and infection are in the differential.  No major change from the [facility] interpretation.    Electronically signed by: Janice Bradshaw  Date:    03/24/2023  Time:    10:38  CT Soft Tissue Neck With Contrast  Narrative: EXAMINATION:  CT SOFT TISSUE NECK WITH CONTRAST    CLINICAL HISTORY:  Lymphadenopathy, neck;    TECHNIQUE:  Axial CT images of the neck were obtained after intravenous contrast. Reformatted images in the  sagittal and coronal plane were included.    Automatic exposure control was utilized to limit radiation dose.    DLP: 431 mGy-cm    COMPARISON:  None.    FINDINGS:  The airways are patent.  There are mildly enlarged bilateral cervical lymph nodes, right greater than left.  Heterogeneous right upper cervical lymph node measures 1.2 cm in short axis (series 2, image 42).  A left supraclavicular lymph node measures 1.2 cm in short axis (series 2, image 61).  There is subcutaneous edema in the right lower neck and anterior chest wall.  There is no drainable fluid collection identified.    The parotid glands, submandibular glands and thyroid gland are unremarkable.  The major vessels in the neck enhance normally.  There is no acute abnormality of the orbits or visualized brain parenchyma.  There is no destructive bone lesion.  Right pleural effusion is noted with overlying dependent airspace consolidation.  Impression: 1. Bilateral cervical lymphadenopathy, right greater than left.  2. Subcutaneous edema in the right lower neck and chest wall.  No drainable fluid collection identified.  No significant change from the [facility] interpretation.    Electronically signed by: Janice Bradshaw  Date:    03/24/2023  Time:    09:45      Leida Alvarez MD   03/25/2023

## 2023-03-26 NOTE — PROGRESS NOTES
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     Present to the ED with complaint of worsening right upper extremity swelling and pain, intermittent dizziness/vertigo shortness of breath and changes in her voice all of which worsening over the past week.  History taken from patient and her family at bedside.  Report over the past 2 weeks she has been experiencing dizziness/vertigo and unsteady gait, denies fall, headache, nausea or vomiting, her pain medication was adjusted from Norco to oxycodone yesterday, no other medication changes.  Report she feels short of breath at rest but denies cough, fever or chills.  Family report that she was more sleepy and forgetful over the past 2 days.  For her right upper extremity lymphedema she had a venous ultrasound done on 03/15/2023 that showed no evidence of deep venous thrombosis.     On arrival to ED she was afebrile, tachycardic, normotensive and saturating 100% on room air.  EKG shows sinus tachycardia.       CTA chest show no evidence of pulmonary embolism, show right-sided moderate to large pleural effusion with right lower lobe atelectasis, diffuse edema involving right shoulder/axilla/right anterolateral chest wall.      CT head with and without contrast did not show any acute or chronic findings, previously seen cerebellar metastatic lesions on MRI 01/31/2023 are not visualized.     Labs notable for WBC 13.9, hemoglobin 8.3, platelets 467, normal BNP and negative troponin, urinalysis show bacteriuria..     She was given analgesics, Lasix 80 mg IV, ceftriaxone and  referred to hospital medicine service for further evaluation and management.    Oncology following the patient, Plan for Palliative Chemo, may need to consider Thoracentesis if unable to wean off of Oxygen    Interval Hx:   Patient was seen and examined at bedside, family at bedside, continue to be having rt arm pain and swelling,off of oxygen, no fever/chills    Chart was reviewed, HDS, leukopenia and anemia on Labs, K-normalized    Objective/physical exam:(limited this morning)  General: In no acute distress, afebrile  Chest: Clear to auscultation bilaterally  Heart: RRR, +S1, S2, no appreciable murmur  Abdomen: Soft, nontender, BS +  MSK: Warm, no lower extremity edema, no clubbing or cyanosis, Rt UE edema+ve  Neurologic: Alert and oriented x4, Cranial nerve II-XII intact, Strength 5/5 in all 4 extremities    VITAL SIGNS: 24 HRS MIN & MAX LAST   Temp  Min: 97.4 °F (36.3 °C)  Max: 98.2 °F (36.8 °C) 97.5 °F (36.4 °C)   BP  Min: 109/57  Max: 129/70 (!) 109/57   Pulse  Min: 75  Max: 95  87   Resp  Min: 16  Max: 20 18   SpO2  Min: 92 %  Max: 100 % 98 %       Recent Labs   Lab 03/24/23  0646 03/25/23  1007 03/26/23  1050   WBC 7.6 3.0* 3.7*   RBC 2.88* 2.93* 2.80*   HGB 8.0* 8.1* 7.7*   HCT 25.3* 26.3* 24.9*   MCV 87.8 89.8 88.9   MCH 27.8 27.6 27.5   MCHC 31.6* 30.8* 30.9*   RDW 16.9 16.5 16.2    358 317   MPV 10.0 9.7 9.7       Recent Labs   Lab 03/22/23  1049 03/23/23  0950 03/24/23  0646 03/25/23  0734 03/25/23  1953 03/26/23  1050    138 137 137 134* 135*   K 4.7 4.6 4.6 5.9* 4.9 4.2   CO2 29 26 31* 30* 28 26   BUN 8.0* 10.7 14.2 15.5 17.6 18.1   CREATININE 0.72 0.79 0.79 0.72 0.69 0.66   CALCIUM 9.5 9.8 9.3 9.5 9.2 9.1   MG  --   --  2.00  --   --   --    ALBUMIN 2.8* 3.0* 3.0*  --   --   --    ALKPHOS 175* 163* 144  --   --   --    ALT 83* 86* 98*  --   --   --    AST 59* 60* 76*  --   --   --    BILITOT 0.2 0.4 0.4  --   --   --           Microbiology Results (last 7 days)       ** No results found  for the last 168 hours. **             See below for Radiology    Scheduled Med:   docusate sodium  100 mg Oral BID    enoxaparin  40 mg Subcutaneous Daily    morphine  15 mg Oral BID    polyethylene glycol  17 g Oral Daily        Continuous Infusions:       PRN Meds:  acetaminophen, acetaminophen, albuterol-ipratropium, aluminum-magnesium hydroxide-simethicone, meclizine, melatonin, morphine, ondansetron, oxyCODONE, polyethylene glycol, prochlorperazine, senna-docusate 8.6-50 mg, simethicone, sodium chloride 0.9%       Assessment/Plan:  AHRF  Right UE lymphedema   Stage IV Triple Negative Breast Cancer - Mets to bone and brain  Chemotherapy induced anemia and neutropenia - last chemo 3/22/23  Voice changes  Dizziness,History of XRT to brain mets  Anemia, unspecified, Likely from Chronic Disease  Leukopenia  Hyperkalemia        Plan:  Oncology following, Radiation oncology for palliative XRT   Needs Wedge for arm to keep elevated  Pain meds to be continued as needed, monitor Bowel Movements  MRI Brain was reviewed-decrease in cerebral mets, 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, Oncology following, will appreciate recs  Monitor CBC for Hb and WBC ,f/u FOB  K-normalized  Home Medications for Chronic Medical Problems reviewed and resumed as needed  PT/OT to continue      VTE prophylaxis: Lovenox    Patient condition:  guarded    Anticipated discharge and Disposition:         All diagnosis and differential diagnosis have been reviewed; assessment and plan has been documented; I have personally reviewed the labs and test results that are presently available; I have reviewed the patients medication list; I have reviewed the consulting providers response and recommendations. I have reviewed or attempted to review medical records based upon their availability    All of the patient's questions have been  addressed and answered. Patient's is agreeable to the above stated plan. I will continue to monitor closely  and make adjustments to medical management as needed.  _____________________________________________________________________    Nutrition Status:    Radiology:  MRI Brain W WO Contrast  Narrative: EXAMINATION:  MRI BRAIN W WO CONTRAST    CLINICAL HISTORY:  Brain/CNS neoplasm, assess treatment response;Brain metastases, monitor;    TECHNIQUE:  Multiplanar, multisequence MR images of the brain were obtained with and without administration of intravenous contrast.    COMPARISON:  MRI brain dated 01/31/2023    FINDINGS:  Evaluation is limited by motion artifact.  There is no restricted diffusion.  The cerebellar parenchymal nodules have decreased in size and number.  Residual nodule along the left posterior cerebellar convexity now measures 10 mm compared to 16 mm previously (series 10, image 22).  There is new leptomeningeal enhancement predominantly seen in the posterior fossa, but also possibly in the bilateral anterior frontal and temporal lobes.    There is no significant mass effect or midline shift.  The basal cisterns are patent.  There is no hydrocephalus or abnormal extra-axial fluid collection.  The major intracranial flow voids are patent.  There is trace scattered paranasal sinus mucosal thickening.  Scattered enhancing bone marrow lesions are redemonstrated.  Impression: 1. Interval decrease in size and number of enhancing cerebellar metastases.  2. New leptomeningeal enhancement, predominantly in the posterior fossa.  Findings may be treatment related, however metastatic disease and infection are in the differential.  No major change from the [facility] interpretation.    Electronically signed by: Janice Bradshaw  Date:    03/24/2023  Time:    10:38  CT Soft Tissue Neck With Contrast  Narrative: EXAMINATION:  CT SOFT TISSUE NECK WITH CONTRAST    CLINICAL HISTORY:  Lymphadenopathy, neck;    TECHNIQUE:  Axial CT images of the neck were obtained after intravenous contrast. Reformatted images in the sagittal  and coronal plane were included.    Automatic exposure control was utilized to limit radiation dose.    DLP: 431 mGy-cm    COMPARISON:  None.    FINDINGS:  The airways are patent.  There are mildly enlarged bilateral cervical lymph nodes, right greater than left.  Heterogeneous right upper cervical lymph node measures 1.2 cm in short axis (series 2, image 42).  A left supraclavicular lymph node measures 1.2 cm in short axis (series 2, image 61).  There is subcutaneous edema in the right lower neck and anterior chest wall.  There is no drainable fluid collection identified.    The parotid glands, submandibular glands and thyroid gland are unremarkable.  The major vessels in the neck enhance normally.  There is no acute abnormality of the orbits or visualized brain parenchyma.  There is no destructive bone lesion.  Right pleural effusion is noted with overlying dependent airspace consolidation.  Impression: 1. Bilateral cervical lymphadenopathy, right greater than left.  2. Subcutaneous edema in the right lower neck and chest wall.  No drainable fluid collection identified.  No significant change from the [facility] interpretation.    Electronically signed by: Janice Bradshaw  Date:    03/24/2023  Time:    09:45      Leida Alvarez MD   03/26/2023

## 2023-03-26 NOTE — PLAN OF CARE
Problem: Adult Inpatient Plan of Care  Goal: Plan of Care Review  Outcome: Ongoing, Progressing  Flowsheets (Taken 3/26/2023 0051)  Plan of Care Reviewed With:   patient   caregiver  Goal: Patient-Specific Goal (Individualized)  Outcome: Ongoing, Progressing  Flowsheets (Taken 3/26/2023 0051)  Anxieties, Fears or Concerns: Swelling  Individualized Care Needs: weaning off oxygen  Goal: Absence of Hospital-Acquired Illness or Injury  Outcome: Ongoing, Progressing  Goal: Optimal Comfort and Wellbeing  Outcome: Ongoing, Progressing  Intervention: Monitor Pain and Promote Comfort  Flowsheets (Taken 3/26/2023 0051)  Pain Management Interventions:   care clustered   pain management plan reviewed with patient/caregiver   medication offered  Goal: Readiness for Transition of Care  Outcome: Ongoing, Progressing     Problem: Bariatric Environmental Safety  Goal: Safety Maintained with Care  Outcome: Ongoing, Progressing     Problem: Infection  Goal: Absence of Infection Signs and Symptoms  Outcome: Ongoing, Progressing     Problem: Pain Acute  Goal: Acceptable Pain Control and Functional Ability  Outcome: Ongoing, Progressing

## 2023-03-26 NOTE — PT/OT/SLP PROGRESS
Physical Therapy Treatment    Patient Name:  Esme Marei   MRN:  [id number]    Recommendations:     Discharge Recommendations:  (TBD)  Discharge Equipment Recommendations: walker, rolling  Barriers to discharge: None    Assessment:     Esme Marie is a 57 y.o. female admitted with a medical diagnosis of Cancer related pain.  She presents with the following impairments/functional limitations: weakness, impaired endurance, impaired self care skills, impaired functional mobility, gait instability, impaired balance, decreased upper extremity function, decreased lower extremity function, decreased coordination.    Rehab Prognosis: Good; patient would benefit from acute skilled PT services to address these deficits and reach maximum level of function.    Recent Surgery: * No surgery found *      Plan:     During this hospitalization, patient to be seen 5 x/week to address the identified rehab impairments via gait training, therapeutic activities, therapeutic exercises, neuromuscular re-education and progress toward the following goals:    Plan of Care Expires:  04/24/23    Subjective     Chief Complaint: None  Pain/Comfort:  0/10      Objective:     Communicated with NSG prior to session.  Patient found supine upon PT entry to room.     General Precautions: Standard, fall  Orthopedic Precautions: N/A  Braces: N/A  Respiratory Status: Room air  Skin Integrity: Visible skin intact      Functional Mobility:  Bed Mobility:     Rolling Left:  minimum assistance  Rolling Right: minimum assistance  Transfers:     Sit to Stand:  moderate assistance with hand-held assist  Bed to Chair: minimum assistance with  rolling walker  using  Stand Pivot  Gait: 15'  Balance: Poor standing balance       Patient left up in chair with all lines intact and call button in reach.    GOALS:   Multidisciplinary Problems       Physical Therapy Goals          Problem: Physical Therapy    Goal Priority Disciplines Outcome Goal Variances  Interventions   Physical Therapy Goal     PT, PT/OT Ongoing, Progressing     Description: Goals to be met by: 23     Patient will increase functional independence with mobility by performin. Supine to sit with [place]  2. Sit to supine with [place]  3. Sit to stand transfer with Modified [place]  4. Gait  x 200 feet with Modified [place] using Rolling Walker.                          Time Tracking:     Billable Minutes: Gait Training 8 and Therapeutic Activity 15    Treatment Type: Treatment  PT/PTA: PTA     Number of PTA visits since last PT visit: 2023

## 2023-03-27 NOTE — PT/OT/SLP PROGRESS
"Physical Therapy         Treatment        Esme Marie   MRN: [id number]     PT Received On: 03/27/23  PT Start Time: 0839     PT Stop Time: 0853    PT Total Time (min): 14 min       Billable Minutes:  Therapeutic Activity 14  Total Minutes: 14    Treatment Type: Treatment  PT/PTA: PTA     Number of PTA visits since last PT visit: 2       General Precautions: Standard, fall  Orthopedic Precautions: Orthopedic Precautions : N/A   Braces:      Spiritual, Cultural Beliefs, Scientology Practices, Values that Affect Care: no    Subjective:  Communicated with NSG prior to session.    Pain/Comfort  Location - Orientation 1: generalized  Pain Addressed 1: Distraction, Reposition    Objective:  Patient found in bed with HOB elevated, with Patient found with: telemetry    BP with activity: 122/82 with HR of 99    Functional Mobility:  Bed Mobility:   Supine to sit: Moderate Assistance   Sit to supine: Activity did not occur   Rolling: Activity did not occur   Scooting: Moderate Assistance. Pt with multiple LOBs while scooting towards EOB. Pt feeling "dizzy" from pain meds.     Balance:   Static Sit: FAIR-: Maintains without assist but inconsistent   Dynamic Sit:  FAIR: Cannot move trunk without losing balance  Static Stand: POOR+: Needs MINIMAL assist to maintain  Dynamic stand: POOR: Needs MOD (moderate) assist during gait    Transfer Training:  Sit to stand:Minimal Assistance with Rolling Walker . 1 trial From EOB  Bed <> Chair:  Step Transfer with Moderate Assistance with Rolling Walker . Pt T/F EOB>bedside chair. Pt very unsteady during T/F.       Additional Treatment:  Pt very dizzy from pain meds and requesting to just T/F to chair with PT at this time.     Activity Tolerance:  Patient limited by fatigue and Treatment limited secondary to medical complications .    Patient left up in chair with all lines intact and call button in reach.    Assessment:  Esme Marie is a 57 y.o. female with a medical diagnosis of " Cancer related pain. She presents with decreased safety awareness and remains a fall risk. Pt BP WNL but she c/o severe dizziness. Pt very off balance throughout tx but insisting of T/Fing to chair. NSG made aware.     Rehab potential is good.    Activity tolerance: Good    Discharge recommendations: Discharge Facility/Level of Care Needs: nursing facility, skilled, home with home health, home health PT (with 24hr care)     Equipment recommendations: Equipment Needed After Discharge: walker, rolling     GOALS:   Multidisciplinary Problems       Physical Therapy Goals          Problem: Physical Therapy    Goal Priority Disciplines Outcome Goal Variances Interventions   Physical Therapy Goal     PT, PT/OT Ongoing, Progressing     Description: Goals to be met by: 23     Patient will increase functional independence with mobility by performin. Supine to sit with [place]  2. Sit to supine with [place]  3. Sit to stand transfer with Modified [place]  4. Gait  x 200 feet with Modified [place] using Rolling Walker.                          PLAN:    Patient to be seen 5 x/week  to address the above listed problems via gait training, therapeutic activities, therapeutic exercises, neuromuscular re-education  Plan of Care expires: 23  Plan of Care reviewed with: patient, family         3/27/2023

## 2023-03-27 NOTE — PROGRESS NOTES
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 CT scan x2 in ER without evidence of abnormality. She is currently 2 days out for C3D1 of [place]. Oncology consulted for further evaluation.     Today she was seen and evaluated. She actually looks better than she did in clinic 2 days ago. She does have voice changes which is new since I saw her in clinic, her lymphedema to her R hand/forearm is better but her chest/neck is now worse. She was previously planned to see radiation oncology on Monday for palliative XRT, but will ask them to see her today.     Today 3/27/23 - Patient seen and examined this morning. Her RUE edema is about the same. She is still having dizziness. Was able to be weaned off oxygen over the weekend. She has not moved much out of bed. Discussed MRI findings with new leptomeningeal enhancement - infections vs disease vs treatment changes. Discussed possibility of LP either inpatient or outpatient with patient and her family. Pain is controlled. She has no other complaints.       From my Clinic Note  56yo F who presented in January 2023 for evaluation of R Breast Cancer. She noticed R breast skin changes in December '22 just prior to her already planned annual MMG. Her breast changes including skin changes, darkening, nipple retraction, and breast firmness has developed rapidly over the last month since diagnosis - made worse after her MMG and Breast MRI. She was  found to have extensive R breast and axilla abnormality with breast imaging with MMG/US and Breast MRI. 1/26/23 staing PET with evidence of Diffuse FDG uptake throughout the right breast with overlying skin thickening, right axillary lymphadenopathy and uptake in the right chest wall musculature.Bilateral hypermetabolic cervical lymphadenopathy.Diffuse hypermetabolic osseous metastatic disease. She then underwent Brain MRI which showed evidence of metastatic disease.   Since that time she has begun chemotherapy for her metastatic Triple negative breast cancer. She began Carboplatin + Gemcitabine in February 2023. She underwent brain radiation for her known brain mets in February '23.  Will be starting zometa for her osseous disease in March '23.     Oncology Treatment Plan:   OP BREAST GEMCITABINE CARBOPLATIN Q3W    Medications:  Continuous Infusions:    Scheduled Meds:   docusate sodium  100 mg Oral BID    enoxaparin  40 mg Subcutaneous Daily    morphine  15 mg Oral BID    polyethylene glycol  17 g Oral Daily     PRN Meds:acetaminophen, acetaminophen, albuterol-ipratropium, aluminum-magnesium hydroxide-simethicone, meclizine, melatonin, morphine, ondansetron, oxyCODONE, polyethylene glycol, prochlorperazine, senna-docusate 8.6-50 mg, simethicone, sodium chloride 0.9%     Review of patient's allergies indicates:  No Known Allergies     Past Medical History:   Diagnosis Date    Breast cancer     Hyperlipidemia     Knee pain     Menopausal flushing     Menopause 3/2018    Normocytic normochromic anemia     Vitamin D deficiency      Past Surgical History:   Procedure Laterality Date    PLACEMENT, MEDIPORT N/A 01/23/2023    Procedure: Placement, Mediport;  Surgeon: Silva Shields MD;  Location: [facility];  Service: General;  Laterality: N/A;    TUBAL LIGATION       Family History       Problem Relation (Age of Onset)    Arthritis Mother    Diabetes Mother    Hypertension Mother    Lung cancer Father (62)           Tobacco Use    Smoking status: Never    Smokeless tobacco: Never   Substance and Sexual Activity    Alcohol use: Yes     Comment: occassionally    Drug use: Never    Sexual activity: Yes     Partners: Male     Birth control/protection: None       Review of Systems   Constitutional:  Negative for appetite change and unexpected weight change.   HENT:  Negative for mouth sores.    Eyes:  Negative for visual disturbance.   Respiratory:  Negative for cough and shortness of breath.    Cardiovascular:  Negative for chest pain.   Gastrointestinal:  Negative for abdominal pain and diarrhea.   Genitourinary:  Negative for frequency.   Musculoskeletal:  Positive for back pain.   Integumentary:  Negative for rash.   Neurological:  Negative for headaches.   Hematological:  Positive for adenopathy.   Psychiatric/Behavioral:  The patient is not nervous/anxious.      Objective:     Vital Signs (Most Recent):  Temp: 97.9 °F (36.6 °C) (03/27/23 0719)  Pulse: 92 (03/27/23 0719)  Resp: 20 (03/27/23 0719)  BP: 116/69 (03/27/23 0719)  SpO2: 100 % (03/27/23 0719) Vital Signs (24h Range):  Temp:  [97.4 °F (36.3 °C)-98.1 °F (36.7 °C)] 97.9 °F (36.6 °C)  Pulse:  [80-92] 92  Resp:  [16-20] 20  SpO2:  [92 %-100 %] 100 %  BP: (109-135)/(57-81) 116/69     Weight: 112.7 kg (248 lb 7.3 oz)  Body mass index is 41.35 kg/m².  Body surface area is 2.27 meters squared.      Intake/Output Summary (Last 24 hours) at 3/27/2023 0812  Last data filed at 3/27/2023 0621  Gross per 24 hour   Intake 820 ml   Output 1900 ml   Net -1080 ml       Physical Exam  Constitutional:       General: She is not in acute distress.     Appearance: Normal appearance. She is not ill-appearing.   HENT:      Head: Normocephalic and atraumatic.      Nose: Nose normal.      Mouth/Throat:      Mouth: Mucous membranes are moist.      Pharynx: Oropharynx is clear.   Eyes:      Extraocular Movements: Extraocular movements intact.      Conjunctiva/sclera: Conjunctivae normal.    Pulmonary:      Effort: Pulmonary effort is normal. No respiratory distress.   Musculoskeletal:         General: No swelling.      Right lower leg: No edema.      Left lower leg: No edema.   Lymphadenopathy:      Comments: RUE lymphedema   Skin:     General: Skin is dry.      Coloration: Skin is not jaundiced.   Neurological:      General: No focal deficit present.      Mental Status: She is alert and oriented to person, place, and time. Mental status is at baseline.     Chest - Right breast with skin erythema, thickening, and dimpling consistent with inflammatory changes throughout the entire breast. Breast is firm throughout. Nipple retraction. Small area of skin breakdown below nipple.  Right axillary and R supraclavicular LAD palpated. No abnormality noted in left breast or axilla.     Significant Labs:   CBC:   Recent Labs   Lab 03/25/23  1007 03/26/23  1050 03/27/23  0522   WBC 3.0* 3.7* 3.0*   HGB 8.1* 7.7* 7.8*   HCT 26.3* 24.9* 24.7*    317 298      and CMP:   Recent Labs   Lab 03/25/23 1953 03/26/23  1050 03/27/23  0522   * 135* 135*   K 4.9 4.2 4.6   CO2 28 26 31*   BUN 17.6 18.1 16.9   CREATININE 0.69 0.66 0.69   CALCIUM 9.2 9.1 9.5         Diagnostic Results:  Impression:  Cerebellar metastases seen on comparison MRI dated 01/31/2023 are not definitely visualized.  No new acute abnormality identified.    Assessment/Plan:     Stage IV Triple Negative Breast Cancer - Mets to bone and brain  Chemotherapy induced anemia and neutropenia - last chemo 3/22/23  Right UE lymphedema due to disease  History of XRT to brain mets  Voice changes  Dizziness    - If can arrange for LP today, then would proceed - need cultures and cytology  -  If unable to due to timing of lovenox/schedule, then can be discharged today and I will arrange for procedure outpatient  - Patient needs to get OOB, siting in chair  - Has follow up with me on Wednesday  - Planning to proceed with XRT outpatient this  week      Thank you for your consult.     Elizabeth K Lejeune, MD  Hematology/Oncology  [facility] - Oncology [facility]

## 2023-03-27 NOTE — DISCHARGE SUMMARY
[facility] Hospital Medicine Discharge Summary    Admit Date: 3/23/2023  Discharge Date and Time: 3/27/60949:57 PM  Admitting Physician: KIP Team  Discharging Physician: Leida Alvarez MD.  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     Present to the ED with complaint of worsening right upper extremity swelling and pain, intermittent dizziness/vertigo shortness of breath and changes in her voice all of which worsening over the past week.  History taken from patient and her family at bedside.  Report over the past 2 weeks she has been experiencing dizziness/vertigo and unsteady gait, denies fall, headache, nausea or vomiting, her pain medication was adjusted from Norco to oxycodone yesterday, no other medication changes.  Report she feels short of breath at rest but denies cough, fever or chills.  Family report that she was more sleepy and forgetful over the past 2 days.  For her right upper extremity lymphedema she had a venous ultrasound done on 03/15/2023 that showed no evidence of deep venous thrombosis.     On arrival to ED she was afebrile, tachycardic, normotensive and saturating 100% on room air.  EKG shows sinus tachycardia.       CTA chest show no evidence of pulmonary embolism, show right-sided  moderate to large pleural effusion with right lower lobe atelectasis, diffuse edema involving right shoulder/axilla/right anterolateral chest wall.      CT head with and without contrast did not show any acute or chronic findings, previously seen cerebellar metastatic lesions on MRI 01/31/2023 are not visualized.     Labs notable for WBC 13.9, hemoglobin 8.3, platelets 467, normal BNP and negative troponin, urinalysis show bacteriuria..     She was given analgesics, Lasix 80 mg IV, ceftriaxone and referred to hospital medicine service for further evaluation and management.    Patient's pain was managed with PRN analgesics,Hyperkalemia was corrected with Kayexalate and IV insulin, Oncology will arrange for Palliative Chemo outpatient.Oncology did not recommend to put the patient through any invasive procedures if we are able to wean her off of oxygen as there is a chance it might keep building up if we start tapping the fluid from Lungs.Patient was comfortable on room air.Pt was seen and examined on the day of discharge, per most recent MRI findings,Oncology recommended LP, and hence planned to discharge after Lumbar Puncture as per Oncology. Dexamethasone was not recommended by Oncology as per the Nursing Staff.      Vitals:  VITAL SIGNS: 24 HRS MIN & MAX LAST   Temp  Min: 97.9 °F (36.6 °C)  Max: 98 °F (36.7 °C) 98 °F (36.7 °C)   BP  Min: 103/62  Max: 118/79 103/62   Pulse  Min: 80  Max: 105  105   Resp  Min: 18  Max: 20 18   SpO2  Min: 95 %  Max: 100 % 95 %       Physical Exam:  General: In no acute distress, afebrile  Chest: Clear to auscultation bilaterally  Heart: RRR, +S1, S2, no appreciable murmur  Abdomen: Soft, nontender, BS +  MSK: Warm, no lower extremity edema, no clubbing or cyanosis, Rt UE edema+ve  Neurologic: Alert and oriented x4, Cranial nerve II-XII intact, Strength 5/5 in all 4 extremities    Procedures Performed: No admission procedures for hospital encounter.     Significant Diagnostic Studies:  See Full reports for all details    Recent Labs   Lab 03/25/23  1007 03/26/23  1050 03/27/23  0522   WBC 3.0* 3.7* 3.0*   RBC 2.93* 2.80* 2.80*   HGB 8.1* 7.7* 7.8*   HCT 26.3* 24.9* 24.7*   MCV 89.8 88.9 88.2   MCH 27.6 27.5 27.9   MCHC 30.8* 30.9* 31.6*   RDW 16.5 16.2 15.9    317 298   MPV 9.7 9.7 9.8       Recent Labs   Lab 03/22/23  1049 03/23/23  0950 03/24/23  0646 03/25/23  0734 03/25/23  1953 03/26/23  1050 03/27/23  0522    138 137   < > 134* 135* 135*   K 4.7 4.6 4.6   < > 4.9 4.2 4.6   CO2 29 26 31*   < > 28 26 31*   BUN 8.0* 10.7 14.2   < > 17.6 18.1 16.9   CREATININE 0.72 0.79 0.79   < > 0.69 0.66 0.69   CALCIUM 9.5 9.8 9.3   < > 9.2 9.1 9.5   MG  --   --  2.00  --   --   --   --    ALBUMIN 2.8* 3.0* 3.0*  --   --   --   --    ALKPHOS 175* 163* 144  --   --   --   --    ALT 83* 86* 98*  --   --   --   --    AST 59* 60* 76*  --   --   --   --    BILITOT 0.2 0.4 0.4  --   --   --   --     < > = values in this interval not displayed.        Microbiology Results (last 7 days)       ** No results found for the last 168 hours. **             FL LUMBAR PUNCTURE DIAGNOSTIC WITH IMAGING  Narrative: EXAMINATION:  FL LUMBAR PUNCTURE DIAGNOSTIC WITH IMAGING    CLINICAL HISTORY:  Malignant neoplasm of unspecified site of right female breastASsess for disease vs infection, leptomeningeal enhancement, s/p  XRT;    TECHNIQUE:  After informed consent was obtained patient was placed on the fluoroscopy table in the prone position. Under fluoroscopic guidance, the L4-L5 intervertebral space was identified. The area over the lower back was then prepped and draped in the normal sterile fashion. The skin over this area was anesthetized with a 1% lidocaine solution. A 20-gauge spinal needle was then introduced into the L4-L5 intervertebral space under fluoroscopic guidance. The needle was advanced until the subarachnoid space was reached.    Following collection of specimens for analysis, the spinal needle was  removed and a sterile dressing applied at the entry site. The patient tolerated the procedure without difficulty. The patient was discharged from the fluoroscopy suite in stable condition.    FINDINGS:  A total of approximately 3 mL of clear cerebrospinal fluid was collected and sent to the laboratory for analysis.    Fluoro time 44 seconds.    Reference Air Kerma: 30 mGy.  Impression: Status post lumbar puncture, specimens sent to lab as ordered.    Electronically signed by: Pravin Enriquez  Date:    03/27/2023  Time:    17:12         Medication List        START taking these medications      acetaminophen 325 MG tablet  Commonly known as: TYLENOL  Take 2 tablets (650 mg total) by mouth every 8 (eight) hours as needed for Pain (or equal to 100.4 degree F).     melatonin 3 mg tablet  Commonly known as: MELATIN  Take 1 tablet (3 mg total) by mouth nightly as needed for Insomnia.     senna-docusate 8.6-50 mg 8.6-50 mg per tablet  Commonly known as: PERICOLACE  Take 2 tablets by mouth 2 (two) times daily as needed for Constipation ((First Choice)).            CHANGE how you take these medications      polyethylene glycol 17 gram Pwpk  Commonly known as: GLYCOLAX  Take 17 g by mouth daily as needed ((Second Choice)).  What changed:   when to take this  reasons to take this            CONTINUE taking these medications      docusate sodium 100 MG capsule  Commonly known as: COLACE  Take 1 capsule (100 mg total) by mouth 2 (two) times daily.     fish oil-omega-3 fatty acids 300-1,000 mg capsule     meclizine 25 mg tablet  Commonly known as: ANTIVERT  Take 1 tablet (25 mg total) by mouth 3 (three) times daily as needed for Dizziness.     morphine 15 MG 12 hr tablet  Commonly known as: MS CONTIN  Take 1 tablet (15 mg total) by mouth 2 (two) times daily.     naproxen sodium 220 MG tablet  Commonly known as: ANAPROX     OLANZapine 5 MG tablet  Commonly known as: ZyPREXA  Take 1 tablet (5 mg total) by mouth every evening. Take as  directed days 1-4 of each chemotherapy cycle.     ondansetron 8 MG Tbdl  Commonly known as: ZOFRAN-ODT  Take 1 tablet (8 mg total) by mouth every 8 (eight) hours as needed (nausea/vomiting).     oxyCODONE 10 mg Tab immediate release tablet  Commonly known as: ROXICODONE  Take 1 tablet (10 mg total) by mouth every 4 (four) hours as needed for Pain.     prochlorperazine 10 MG tablet  Commonly known as: COMPAZINE  Take 1 tablet (10 mg total) by mouth every 6 (six) hours as needed (for nausea). 2nd choice, can cause drowsiness.     traMADoL 50 mg tablet  Commonly known as: ULTRAM  Take 1 tablet (50 mg total) by mouth every 6 (six) hours.     vitamin D 1000 units Tab  Commonly known as: VITAMIN D3            STOP taking these medications      dexAMETHasone 4 MG Tab  Commonly known as: DECADRON               Where to Get Your Medications        These medications were sent to [address] [address]      Phone: [phone]   acetaminophen 325 MG tablet  melatonin 3 mg tablet  polyethylene glycol 17 gram Pwpk  senna-docusate 8.6-50 mg 8.6-50 mg per tablet          Explained in detail to the patient about the discharge plan, medications, and follow-up visits. Pt understands and agrees with the treatment plan  Discharge Disposition: Home or Self Care   Discharged Condition: stable  Diet-   Dietary Orders (From admission, onward)       Start     Ordered    03/23/23 2042  Diet Adult Regular  (Diet/Nutrition [facility])  Diet effective now         03/23/23 2041                   Medications Per [place] med rec  Activities as tolerated    For further questions contact hospitalist office    Discharge time 33 minutes    For worsening symptoms, chest pain, shortness of breath, increased abdominal pain, high grade fever, stroke or stroke like symptoms, immediately go to the nearest Emergency Room or call 911 as soon as possible.      Leida Rascon M.D,  on 3/27/2023. at 6:57 PM.

## 2023-03-29 PROBLEM — I89.0 LYMPHEDEMA: Status: ACTIVE | Noted: 2023-01-01

## 2023-03-29 NOTE — PROGRESS NOTES
Subjective:       Patient ID: Esme Marie is a 57 y.o. female.    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 Elliott  [place] -  1/31/23 - 3/22/23    HPI:   58yo F who presented in January 2023 for evaluation of R Breast Cancer. She noticed R breast skin changes in December '22 just prior to her already planned annual MMG. Her breast changes including skin changes, darkening, nipple retraction, and breast firmness has developed rapidly over the last month since diagnosis - made worse after her MMG and Breast MRI. She was found to have extensive R breast and axilla abnormality with breast imaging with MMG/US and Breast MRI. 1/26/23 staing PET with evidence of Diffuse FDG uptake throughout the right breast with overlying skin thickening, right axillary lymphadenopathy and uptake in the right chest wall musculature.Bilateral hypermetabolic cervical lymphadenopathy.Diffuse hypermetabolic osseous metastatic disease. She then underwent Brain MRI which showed evidence of metastatic disease.   Since that time she has begun chemotherapy for her metastatic Triple negative breast cancer. She began Carboplatin + Gemcitabine in February 2023. She underwent brain radiation for her known brain mets in February '23.  Will be starting zometa for her osseous disease in March '23.     Interval History:  Patient presents to clinic today for follow up appointment following hospital admission and prior to treatment today.   She complains of continued pain to her right breast and arm.   She reports difficulty ambulating along with continued dizziness.  Her pain is controlled, she is only requiring 1 oxycodone a day for breakthrough  R breast skin breakdown continues, no signs of infection. Denies any fevers.   Waiting on insurance approval for XRT to R arm    OB/GYN  History:  Age at Menarche Onset: 14  Menopausal Status: postmenopausal, LMP: 2019  Hysterectomy/Oophorectomy: menopause, at age 54  Hormonal birth control (duration): Yes starting at age 17 used for 2 years.   Pregnancy History:   Age at first live birth: 16  Hormone Replacement Therapy: No, none  Patient did not breast feed      Past Medical History:   Diagnosis Date    Breast cancer     Hyperlipidemia     Knee pain     Menopausal flushing     Menopause 3/2018    Normocytic normochromic anemia     Vitamin D deficiency       Past Surgical History:   Procedure Laterality Date    PLACEMENT, MEDIPORT N/A 2023    Procedure: Placement, Mediport;  Surgeon: Silva Shields MD;  Location: [facility];  Service: General;  Laterality: N/A;    TUBAL LIGATION       Social History     Socioeconomic History    Marital status: Single   Occupational History    Occupation: Supply chain [employer]   Tobacco Use    Smoking status: Never    Smokeless tobacco: Never   Substance and Sexual Activity    Alcohol use: Yes     Comment: occassionally    Drug use: Never    Sexual activity: Yes     Partners: Male     Birth control/protection: None     Social Determinants of Health     Financial Resource Strain: Low Risk     Difficulty of Paying Living Expenses: Not hard at all   Food Insecurity: No Food Insecurity    Worried About Running Out of Food in the Last Year: Never true    Ran Out of Food in the Last Year: Never true   Transportation Needs: No Transportation Needs    Lack of Transportation (Medical): No    Lack of Transportation (Non-Medical): No   Physical Activity: Inactive    Days of Exercise per Week: 0 days    Minutes of Exercise per Session: 0 min   Stress: No Stress Concern Present    Feeling of Stress : Only a little   Social Connections: Moderately Isolated    Frequency of Communication with Friends and Family: More than three times a week    Frequency of Social Gatherings with Friends and Family: More than three times  a week    Attends [employer] Services: 1 to 4 times per year    Active Member of Clubs or Organizations: No    Attends Club or Organization Meetings: Never    Marital Status: Never    Housing Stability: Unknown    Unable to Pay for Housing in the Last Year: No    Unstable Housing in the Last Year: No      Family History   Problem Relation Age of Onset    Hypertension Mother     Diabetes Mother     Arthritis Mother     Lung cancer Father 62      Review of patient's allergies indicates:  No Known Allergies   Review of Systems   Constitutional:  Negative for appetite change and unexpected weight change.   HENT:  Negative for mouth sores.    Eyes:  Negative for visual disturbance.   Respiratory:  Negative for cough and shortness of breath.    Cardiovascular:  Negative for chest pain.   Gastrointestinal:  Negative for abdominal pain and diarrhea.   Genitourinary:  Negative for frequency.   Musculoskeletal:  Positive for back pain.   Integumentary:  Negative for rash.   Neurological:  Negative for headaches.   Hematological:  Positive for adenopathy.   Psychiatric/Behavioral:  The patient is not nervous/anxious.        Objective:      Vitals:    03/29/23 0856   BP: 127/80   Pulse: (!) 115   Resp: 18   Temp: 97.8 °F (36.6 °C)       Physical Exam  Constitutional:       General: She is not in acute distress.     Appearance: Normal appearance. She is not ill-appearing.   HENT:      Head: Normocephalic and atraumatic.      Nose: Nose normal.      Mouth/Throat:      Mouth: Mucous membranes are moist.      Pharynx: Oropharynx is clear.   Eyes:      Extraocular Movements: Extraocular movements intact.      Conjunctiva/sclera: Conjunctivae normal.   Pulmonary:      Effort: Pulmonary effort is normal. No respiratory distress.   Musculoskeletal:         General: No swelling.      Right lower leg: No edema.      Left lower leg: No edema.   Lymphadenopathy:      Comments: RUE lymphedema   Skin:     General: Skin is dry.       Coloration: Skin is not jaundiced.   Neurological:      General: No focal deficit present.      Mental Status: She is alert and oriented to person, place, and time. Mental status is at baseline.     Chest - Right breast with skin erythema, thickening, and dimpling consistent with inflammatory changes throughout the entire breast. Breast is firm throughout. Nipple retraction. Enlarging area of skin breakdown around nipple.  R arm lymphedema severe with extension to her R upper chest.     LABS AND IMAGING REVIEWED IN EPIC    Imagin. 2022 BL DG MG/ R US BREAST LIMITED at [facility]- which revealed in R breast, a developing asymmetry extending from the retroareolar area to involve all quadrants of the right breast, anterior to posterior depth, measuring approximately 12 cm.  There is a 3.3 cm oval, obscured, equal density mass-like component in the lower central right breast, middle to posterior depth.  Triangle marker is noted on the upper outer right breast demarcating the site of clinical concern with underlying 1.7 cm focal asymmetry in the upper outer right breast, posterior depth.  There is diffuse right breast skin thickening most prominent in the periareolar breast.  There is right nipple retraction.  There is a 3.8 cm oval, high density mass/abnormal lymph node with indistinct margin in the right axilla.   On R US, there is a 1.5 x 0.8 x 1.7 cm irregular, non parallel, heterogeneous dermal-subdermal mass with indistinct margin, minimal internal vascularity, and some posterior shadowing in the upper outer right breast at 10 o'clock 12 cm FN,  the site of patient indicated palpable lump, correlating with the mammographic focal asymmetry.  There is associated skin thickening of approximately 0.4 cm.  There is adjacent heterogeneous, echogenic signal within the subdermal fat measuring approximately 2.4 x 1.2 x 2.1 cm.   There is a 2.2 x 1.7 x 3.3 cm irregular, non parallel, hypoechoic mass with angular and  indistinct margin, peripheral vascularity, and some posterior enhancement in the lower central right breast at 6 o'clock 9 cm FN, correlating with the mass-like mammographic finding.  There is skin thickening measuring approximately 0.8 cm and edema in the retroareolar right breast with loss of the normal soft tissue planes.  There is associated hyperemia.   There is a 5.3 x 3.9 x 5.5 cm irregular, non parallel, hypoechoic mass with angular and indistinct margins, peripheral vascularity, and posterior shadowing in the upper outer right breast at 10 o'clock 8 cm FN..     There is a 3.6 x 2.1 x 3.8 cm oval, hypoechoic mass/abnormal lymph node with indistinct margin, minimal internal vascularity, and posterior enhancement in the right axilla at 10 o'clock 14 cm FN. BIRADS-4 suspicious; biopsy recommended.       2. 2023 US SOFT TISSUE HEAD NECK AND THYROID at [facility]- which revealed at area of palpable complaint corresponds with a heterogeneous mixed echogenicity 1.1 x 1 x 0.6 cm lesion with internal color Doppler signal.  Deeper to this there is a 2nd, larger 3.5 x 2.7 x 2.1 cm mixed cystic and solid lesion in the right supraclavicular space.  Cystic components may be related to necrosis or suppurative change.     MRI Breast 23  Findings consistent with advanced stage, aggressive right breast inflammatory carcinoma (particularly given rapid clinical onset) includin. Extensive right breast disease involving the right nipple (with retraction) and all quadrants with mass and non-mass enhancement measuring approximately 17.3 (AP) x 8.3 (TR) x 12 (CC) cm.  There is diffuse right breast skin thickening with dermal-subdermal enhancement along the lower inner and lower central right breast, anterior to posterior depth, and upper outer right breast, middle to posterior depth, likely correlating with the site of palpable lump.  2. Abnormal signal extending posteriorly to involve the right pectoralis major muscle (with  asymmetric tenting) and inferiorly and laterally to involve the soft tissue overlying the right chest/upper abdominal wall at the level of the liver concerning for disease progression.    3. Right axillary 4.2 (AP) x 3 (TR) x 3 (CC) cm mass/replaced level 1 lymph node which is biopsy proven carcinoma.  Additional level 1 and level 2 masses/abnormal lymph nodes in conglomerate measuring approximately 4.2 (AP) x 7.1 (TR) which at least abut the pectoralis minor muscle.  Right supraclavicular 4 cm and 2.2 cm masses/abnormal lymph nodes and 1 cm right internal mammary lymph node.  4. Multiple enhancing sternal lesions concerning for metastatic disease, largest measuring approximately 1.5 cm.  5. Apparent kyphotic deformity of the mid thoracic vertebrae with retrolisthesis.  Recommend further evaluation with PET-CT and to assess for distant metastasis.    MRI Brain 1/19/23  Impression:  1. Multiple enhancing nodules in the posterior fossa with mild surrounding edema, concerning for metastatic disease.  2. Enhancing bone marrow lesions in the occipital bones, concerning for osseous metastases.    1/26/23 PET   Impression:  1. Diffuse FDG uptake throughout the right breast with overlying skin thickening, right axillary lymphadenopathy and uptake in the right chest wall musculature.  2. Bilateral hypermetabolic cervical lymphadenopathy.  3. Diffuse hypermetabolic osseous metastatic disease.    Pathology:  1. 12/30/2022 Ultrasound-guided Core Needle Biopsy Right Breast 6 o'clock 9 cm FN - Invasive carcinoma of no special type (ductal), grade 3   ER less than 1%  ND 0%  HER 2 Negative  Ki67 65%     2. 12/30/2022 Ultrasound-guided Core Needle Biopsy Right Axillary Mass/ Abnormal Lymph Node 10 o'clock 14 cm FN - Invasive carcinoma of no special type (ductal); no lymph node architecture identified    Assessment:   Stage IV Metastatic Triple Negative Inflammatory Breast Cancer - Diagnosed December 2022. Mets to brain and bone.    - Plan for Carboplatin/Gemcitabine Day 1, 8 Q 21 for initial chemotherapy   - Zometa for bone mets - Started March '23  - Radiation Oncology following for brain mets - Completed February '23  - Dr. Shields placed mediport on 1/23/23  - Genetics - Pending  -Chemotherapy associated leukopenia and anemia        Plan:      - She is progressing through this current chemotherapy regimen, will hold further therapy and switch to 2nd line  - IVF and zometa today   - Continue MS Contin 15mg BID and oxycodone 10mg Q4H   - XRT to RUE for possible lymphedema relief should happen this week.   - Arrange for [facility] HH, PT/OT, and Palliative care  - RTC 1 week for MD visit, labs same day - CBC, CMP    Elizabeth Kennedy Lejeune, MD  Hematology/Oncology  [facility]

## 2023-03-30 NOTE — PHYSICIAN QUERY
PT Name: Esme Marie  MR #: [id number]    DOCUMENTATION CLARIFICATION     CDS/: Abby Elizabeth RN CCDS            Contact information:[email or web address]  This form is a permanent document in the medical record.     Query Date: March 30, 2023    By submitting this query, we are merely seeking further clarification of documentation.  Please utilize your independent clinical judgment when addressing the question(s) below.    The medical record contains the following:  Pathology Findings Location in Medical Record       A few atypical cells are identified in small groups showing eccentric nuclei   and prominent nucleoli.  The findings are suggestive of metastatic carcinoma of   breast origin.      Cerebrospinal Fluid per LP 3/27, resulted 3/29       Please clarify the pathology findings.  [  ] Pathology findings noted above are ruled in/confirmed as diagnoses   [  ] Pathology findings noted above are not confirmed as diagnoses   [  ] Pathology findings noted above are incidental   [  X] Other diagnosis (please specify): ____Patient to follow up with Oncology______   [ ] Clinically Undetermined     Please document in your progress notes daily for the duration of treatment until resolved and include in your discharge summary.    Form No. [id number]

## 2023-03-30 NOTE — PHYSICIAN QUERY
PT Name: Esme Marie  MR #: [id number]     DOCUMENTATION CLARIFICATION     CDS/: Abby Elizabeth RN CCDS             Contact information:[email or web address]  This form is a permanent document in the medical record.     Query Date: March 30, 2023    By submitting this query, we are merely seeking further clarification of documentation.  Please utilize your independent clinical judgment when addressing the question(s) below.  The Medical Record contains the following   Indicators   Supporting Clinical Findings Location in Medical Record   x Documentation of Respiratory Failure, ARDS Acute hypoxic Resp failure -resolved   Discharge Summary   x SOB, MABRY, Wheezing, Productive Cough, Use of Accessory Muscles, etc. Complaints of Shortness of Breath H&P   x RR     ABGs     O2 sat RR=26-16  O2 sats= % on ROOM AIR   Nursing Flow Sheet   x Hypoxia/Hypercapnia  If became hypoxic or requiring oxygen will consider therapeutic thoracentesis   H&P    BiPAP/Intubation/Mechanical Ventilation      Supplemental O2      Home O2, Oxygen Dependence      Respiratory distress      x Radiology findings No acute cardiopulmonary process.   CXR 3/23   x Acute/Chronic Illness Right UE lymphedema  Stage IV Triple Negative Breast Cancer - Mets to bone and brain  Chemotherapy induced anemia and neutropenia - last chemo 3/22/23  Voice changes  Dizziness,History of XRT to brain mets  Anemia, unspecified, Likely from Chronic Disease  Leukopenia  Hyperkalemia-resolved   Discharge Summary    Treatment     x Other On arrival to ED she was afebrile, tachycardic, normotensive and saturating 100% on room air.    Patient was comfortable on room air.   Discharge Summary       The noted clinical guidelines following a diagnosis are only system guidelines and do not replace the providers clinical judgment.    Acute Hypoxic Respiratory Failure only noted on Discharge Summary.  Due to the conflicting clinical picture, please validate the  diagnosis of __ Acute Hypoxic Respiratory Failure __.    If validated, please provide additional clinical support for the diagnosis.     [    ] Acute Hypoxic Respiratory Failure is ruled out     [ x   ] Acute Respiratory Failure with Hypoxia (ABG pO2 < 60 mmHg or O2 sat of <91% on room air and respiratory symptoms documented) diagnosis is confirmed and additional clinical support/decision-making indicators for the diagnosis include (please specify): ____ct findings suggestive of fluid contributing to hypoxia___________________________________________     [    ] Other clarification (please specify): ___________________         Please document in your progress notes daily for the duration of treatment until resolved and include in your discharge summary.     Reference:    SHERRY Riddle MD. (2020, March 13). Acute respiratory distress syndrome: Clinical features, diagnosis, and complications in adults ([id number] RUDDY Fabian MD & [id number] LEANNA Jones MD, Eds.). Retrieved November 13, 2020, from https://www.[employer].com/contents/acute-respiratory-distress-syndrome-clinical-features-diagnosis-and-complications-in-adults?search=ards&source=search_result&selectedTitle=1~150&usage_type=default&display_rank=1  Form No. [id number]

## 2023-03-31 NOTE — PROGRESS NOTES
Referral to [facility] for Home PT/OT and Palliative Care.  They are unable to accept patient at this time due to Ded & OOP not being met, patient responsibility would be 50%.

## 2023-04-05 NOTE — TELEPHONE ENCOUNTER
Patient's daughter-in-law is stating that patient has been very weak and has no appetite. She even has trouble giving her Boost. She is wondering if patient can have an appetite stimulant. She is also stating that her breast wounds are bleeding. She has been dressing them with A&D ointment, gauze and tegaderm. Please advise.

## 2023-04-07 NOTE — ED TRIAGE NOTES
Pt having weakness s/p getting radiation today. Sent by hem/onc doc due to possibly being dehydrated

## 2023-04-07 NOTE — Clinical Note
Diagnosis: Generalized weakness [512765]   Admitting Provider:: RIGO SEXTON [[id number]]   Future Attending Provider: RIGO SEXTON [[id number]]   Reason for IP Medical Treatment  (Clinical interventions that can only be accomplished in the IP setting? ) :: iv abx   I certify that Inpatient services for greater than or equal to 2 midnights are medically necessary:: Yes   Plans for Post-Acute care--if anticipated (pick the single best option):: A. No post acute care anticipated at this time

## 2023-04-08 PROBLEM — K52.9 COLITIS: Status: ACTIVE | Noted: 2023-01-01

## 2023-04-08 PROBLEM — J90 PLEURAL EFFUSION ON RIGHT: Status: ACTIVE | Noted: 2023-01-01

## 2023-04-08 PROBLEM — J18.9 RIGHT LOWER LOBE PNEUMONIA: Status: ACTIVE | Noted: 2023-01-01

## 2023-04-08 NOTE — H&P
[facility] - Emergency Dept    History & Physical      Patient Name: Esme Marie  MRN: [id number]  Admission Date: 4/7/2023  Attending Physician: Sabrina Davis MD   Primary Care Provider: Elizabeth K Lejeune, MD         Patient information was obtained from patient and ER records.     Subjective:     Principal Problem:<principal problem not specified>    Chief Complaint: N/V, diarrhea, SOB  Chief Complaint   Patient presents with    Fatigue        HPI: The patient is a 57-year-old female with history of triple negative breast cancer with metastasis to the Brain into bone. She was previously on chemotherapy but this was discontinued on March 27 reportedly because she developed the right pleural effusion. Today the patient has shortness of breath, diarrhea, nausea, dizziness, vomiting times one week. She denies any chest pain or abdominal pain. She does have some baseline cancer related pain. CT scan of the abdomen and pelvis obtained in the emergency department is significant for colitis which may be related to malignancy and persistent right plural effusion with consolidation and collapse of lower right lung. Patient was started on IV fluids, IV antibiotics in the emergency department. Of note pt became hypotensive after receiving IV pain medication in the ED so we are holding off for now.     Past Medical History:   Diagnosis Date    Breast cancer     Hyperlipidemia     Knee pain     Menopausal flushing     Menopause 3/2018    Normocytic normochromic anemia     Vitamin D deficiency        Past Surgical History:   Procedure Laterality Date    PLACEMENT, MEDIPORT N/A 01/23/2023    Procedure: Placement, Mediport;  Surgeon: Silva Shields MD;  Location: [facility];  Service: General;  Laterality: N/A;    TUBAL LIGATION         Review of patient's allergies indicates:  No Known Allergies    No current facility-administered medications on file prior to encounter.     Current Outpatient Medications  on File Prior to Encounter   Medication Sig    meclizine (ANTIVERT) 25 mg tablet Take 1 tablet (25 mg total) by mouth 3 (three) times daily as needed for Dizziness.    morphine (MS CONTIN) 15 MG 12 hr tablet Take 1 tablet (15 mg total) by mouth every 8 (eight) hours as needed for Pain.    OLANZapine (ZYPREXA) 5 MG tablet Take 1 tablet (5 mg total) by mouth every evening. Take as directed days 1-4 of each chemotherapy cycle.    ondansetron (ZOFRAN-ODT) 8 MG TbDL Take 1 tablet (8 mg total) by mouth every 8 (eight) hours as needed (nausea/vomiting).    oxyCODONE (ROXICODONE) 10 mg Tab immediate release tablet Take 1 tablet (10 mg total) by mouth every 4 (four) hours as needed for Pain.    prochlorperazine (COMPAZINE) 10 MG tablet Take 1 tablet (10 mg total) by mouth every 6 (six) hours as needed (for nausea). 2nd choice, can cause drowsiness.    acetaminophen (TYLENOL) 325 MG tablet Take 2 tablets (650 mg total) by mouth every 8 (eight) hours as needed for Pain (or equal to 100.4 degree F).    docusate sodium (COLACE) 100 MG capsule Take 1 capsule (100 mg total) by mouth 2 (two) times daily.    melatonin (MELATIN) 3 mg tablet Take 1 tablet (3 mg total) by mouth nightly as needed for Insomnia.    morphine (MS CONTIN) 15 MG 12 hr tablet Take 1 tablet (15 mg total) by mouth 2 (two) times daily.    naproxen sodium (ANAPROX) 220 MG tablet Take 220 mg by mouth every 12 (twelve) hours. Takes as directed prn    omega-3 fatty acids/fish oil (FISH OIL-OMEGA-3 FATTY ACIDS) 300-1,000 mg capsule Take by mouth once daily.    polyethylene glycol (GLYCOLAX) 17 gram PwPk Take 17 g by mouth daily as needed ((Second Choice)).    senna-docusate 8.6-50 mg (PERICOLACE) 8.6-50 mg per tablet Take 2 tablets by mouth 2 (two) times daily as needed for Constipation ((First Choice)).    traMADoL (ULTRAM) 50 mg tablet Take 1 tablet (50 mg total) by mouth every 6 (six) hours.    vitamin D (VITAMIN D3) 1000 units Tab Take 5,000 Units by mouth once  "daily.     Family History       Problem Relation (Age of Onset)    Arthritis Mother    Diabetes Mother    Hypertension Mother    Lung cancer Father (62)          Tobacco Use    Smoking status: Never    Smokeless tobacco: Never   Substance and Sexual Activity    Alcohol use: Yes     Comment: occassionally    Drug use: Never    Sexual activity: Yes     Partners: Male     Birth control/protection: None     Review of Systems 10pt ROS performed and is negative unless noted above.  Objective:     Vital Signs (Most Recent):  Temp: 99.1 °F (37.3 °C) (04/07/23 1901)  Pulse: (!) 111 (04/08/23 0200)  Resp: 12 (04/08/23 0200)  BP: 108/74 (04/08/23 0200)  SpO2: 96 % (04/08/23 0200)   Vital Signs (24h Range):  Temp:  [99.1 °F (37.3 °C)] 99.1 °F (37.3 °C)  Pulse:  [111-138] 111  Resp:  [12-36] 12  SpO2:  [93 %-100 %] 96 %  BP: ()/(65-93) 108/74     Weight: 102.9 kg (226 lb 13.7 oz)  Body mass index is 37.75 kg/m².    Physical Exam   /74   Pulse (!) 111   Temp 99.1 °F (37.3 °C) (Oral)   Resp 12   Ht 5' 5" (1.651 m)   Wt 102.9 kg (226 lb 13.7 oz)   SpO2 96%   BMI 37.75 kg/m²     General Appearance:  Alert, cooperative, no distress, appears stated age   Head:  Normocephalic, without obvious abnormality, atraumatic   Eyes:  PERRL, conjunctiva/corneas clear, EOM's intact, fundi benign, both eyes   Ears:  Normal TM's and external ear canals, both ears   Nose: Nares normal, septum midline,mucosa normal, no drainage or sinus tenderness   Throat: Lips, mucosa, and tongue normal; teeth and gums normal   Neck: Supple, symmetrical, trachea midline, no adenopathy;  thyroid: not enlarged, symmetric, no tenderness/mass/nodules; no carotid bruit or JVD   Back:   Symmetric, no curvature, ROM normal, no CVA tenderness   Lungs:   Clear to auscultation bilaterally, respirations unlabored   Breasts:  No masses or tenderness   Heart:  Regular rate and rhythm, S1 and S2 normal, no murmur, rub, or gallop   Abdomen:   Soft, non-tender, " bowel sounds active all four quadrants,  no masses, no organomegaly   Pelvic: Deferred   Extremities: RUE swelling with lymphedema   Pulses: 2+ and symmetric   Skin: Skin color, texture, turgor normal, no rashes or lesions   Lymph nodes: Cervical, supraclavicular, and axillary nodes normal   Neurologic: Normal         Significant Labs: All pertinent labs within the past 24 hours have been reviewed.  BMP:   Recent Labs   Lab 04/07/23 1958      K 4.1   CO2 28   BUN 10.8   CREATININE 0.69   CALCIUM 8.6   MG 1.90     CBC:   Recent Labs   Lab 04/07/23 1958   WBC 1.6*   HGB 8.1*   HCT 27.1*   PLT 76*     CMP:   Recent Labs   Lab 04/07/23 1958      K 4.1   CO2 28   BUN 10.8   CREATININE 0.69   CALCIUM 8.6   ALBUMIN 2.6*   BILITOT 0.5   ALKPHOS 147   AST 62*   ALT 96*     Cardiac Markers:   Recent Labs   Lab 04/07/23 2000   BNP 18.1     Coagulation:   Recent Labs   Lab 04/07/23 1958   INR 1.18*     Magnesium:   Recent Labs   Lab 04/07/23 1958   MG 1.90     Troponin:   Recent Labs   Lab 04/07/23 1958   TROPONINI <0.010     TSH: No results for input(s): TSH in the last 4320 hours.  Urine Studies: No results for input(s): COLORU, APPEARANCEUA, PHUR, SPECGRAV, PROTEINUA, GLUCUA, KETONESU, BILIRUBINUA, OCCULTUA, NITRITE, UROBILINOGEN, LEUKOCYTESUR, RBCUA, WBCUA, BACTERIA, SQUAMEPITHEL, HYALINECASTS in the last 48 hours.    Invalid input(s): WRIGHTSUR    Significant Imaging: I have reviewed all pertinent imaging results/findings within the past 24 hours.  I have reviewed and interpreted all pertinent imaging results/findings within the past 24 hours.    Impression:     1.  Unchanged soft tissue inflammations around the right shoulder, right axilla and right chest wall.  Right breast asymmetric density and skin thickening.  Please correlate with mammography findings.     2.  Unchanged right axillary lymphadenopathy.     3.  Persistent right pleural effusion and right lower lung lobe collapse consolidation.  4.   Cecum and ascending colon irregular mural thickening and surrounding inflammatory change may be related to colitis with concern for malignancy.  There are pericecal multiple nonenlarged lymph nodes.. 5.  Extensive skeletal sclerotic lesion consistent with metastasis.     Assessment/Plan:     Active Diagnoses:    Diagnosis Date Noted POA    Pleural effusion on right [J90] 04/08/2023 Unknown    Right lower lobe pneumonia [J18.9] 04/08/2023 Unknown    Colitis [K52.9] 04/08/2023 Unknown       - Breast cancer metastasized to bone, unspecified laterality [C50.919, C79.51]  Pancytopenia 01/30/2023 Yes     - Change IV abx to Zosyn to cover pneumonia and colitis  - Pain, nausea control, IVF ongoing  - Advance diet as she tolerates  - Palliative care conversations are appropriate  - Pt is full code at this time    This encounter was completed via telemedicine (audio/video) w/ nursing at bedside ot assist w/ clinical exam.  SOC Audio/Visual Equipment is using HIPPA Compliant Web Platform. The patient is located in the hospital and the provider is located off site. This patient is placed in observation status under my care.       Participants on Call: Bedside RN, Patient, Physician on Call.   Problems Resolved During this Admission:     VTE Risk Mitigation (From admission, onward)           Ordered     enoxaparin injection 40 mg  Daily         04/07/23 2205     IP VTE HIGH RISK PATIENT  Once         04/07/23 2205     Place sequential compression device  Until discontinued         04/07/23 2205                      Kodak Hernandes MD  Department of Hospital Medicine   [facility] - Emergency Dept

## 2023-04-08 NOTE — PROGRESS NOTES
[facility] EMERGENCY DEPARTMENT  Hospital Medicine Progress Note      Patient Name: Esme Marie  MRN: [id number]  Admission Date: 4/7/2023   Length of Stay: 1  Attending Physician: Abdiaziz Lugo MD  Primary Care Provider: Elizabeth K Lejeune, MD  Face-to-Face encounter date: 04/08/2023    Code Status: Full Code        Chief Complaint:   Fatigue        HPI:   The patient is a 57-year-old female with history of triple negative breast cancer with metastasis to the Brain into bone. She was previously on chemotherapy and was stopped for pancytopenia and started on xrt but this was discontinued on March 27 reportedly because she developed the right pleural effusion and pt has remained pancytopenic. Today the patient has shortness of breath, diarrhea, nausea, dizziness, vomiting times one week. She denies any chest pain or abdominal pain. She does have some baseline cancer related pain. CT scan of the abdomen and pelvis obtained in the emergency department is significant for colitis which may be related to malignancy and persistent right plural effusion with consolidation and collapse of lower right lung. Patient was started on IV fluids, IV antibiotics in the emergency department. Of note pt became hypotensive after receiving IV pain medication in the ED so we are holding off for now.     Overview/Hospital Course:  No notes on file       Interval Hx:   Pt resting in bed, family at bedside.  Currently stable no sob on 4L 100%, rn weaning. H&H stable, bp stable.  Comfortable.  No f/c, +nausea poor appetite, has been able to tolerate some food today    Review of Systems   All other systems reviewed and are negative.    Objective/physical exam:  General: In no acute distress, afebrile  Chest: diminished on right  Heart: tachycardic, +S1, S2, no appreciable murmur  Abdomen: Soft, nontender, BS +  MSK: RUE/chest swelling/lymphedema, r  Neurologic: Alert and oriented x4, Cranial  nerve II-XII intact,      VITAL SIGNS: 24 HRS MIN & MAX LAST   Temp  Min: 98.1 °F (36.7 °C)  Max: 99.1 °F (37.3 °C) 98.2 °F (36.8 °C)   BP  Min: 98/68  Max: 152/93 113/73   Pulse  Min: 108  Max: 138  108   Resp  Min: 12  Max: 36 18   SpO2  Min: 93 %  Max: 100 % 99 %       Recent Labs   Lab 04/07/23 1958 04/08/23 0617 04/08/23  1237   WBC 1.6* 1.7*  --    RBC 2.98* 2.68*  --    HGB 8.1* 7.4* 7.9*   HCT 27.1* 24.3* 25.7*   MCV 90.9 90.7  --    MCH 27.2 27.6  --    MCHC 29.9* 30.5*  --    RDW 17.0 17.0  --    PLT 76* 82*  --    MPV 11.5* 11.2*  --        Recent Labs   Lab 04/07/23 1958 04/08/23 0617    135*   K 4.1 3.9   CO2 28 26   BUN 10.8 10.3   CREATININE 0.69 0.63   CALCIUM 8.6 8.2*   MG 1.90  --    ALBUMIN 2.6* 2.4*   ALKPHOS 147 132   ALT 96* 88*   AST 62* 56*   BILITOT 0.5 0.5        Microbiology Results (last 7 days)       Procedure Component Value Units Date/Time    C Diff Toxin by PCR [[id number]]  (Abnormal) Collected: 04/08/23 0734    Order Status: Completed Specimen: Stool Updated: 04/08/23 1319     Clostridium difficile toxin PCR Detected    Narrative:      The Xpert C.difficile is a qualitative in vitro diagnostic test for rapid detection of the toxin B gene sequences of toxigenic Clostridium difficile from unformed (liquid or soft) stool specimens collected from patients suspected of having C. difficile infection (CDI).    Clostridium Diff Toxin, A & B, EIA [[id number]]  (Abnormal) Collected: 04/08/23 0734    Order Status: Completed Specimen: Stool Updated: 04/08/23 1317     Clostridium Difficile GDH Antigen Positive     Clostridium Difficile Toxin A/B Negative    Stool Culture **CANNOT BE ORDERED STAT** [[id number]] Collected: 04/08/23 0734    Order Status: Resulted Specimen: Stool Updated: 04/08/23 0752    Clostridium Diff Toxin, A & B, EIA [[id number]]     Order Status: Canceled Specimen: Stool     Blood Culture #2 **CANNOT BE ORDERED STAT** [[id number]] Collected: 04/07/23 2035    Order  Status: Resulted Specimen: Blood from Arm, Left Updated: 04/07/23 2036    Blood Culture #1 **CANNOT BE ORDERED STAT** [[id number]] Collected: 04/07/23 2015    Order Status: Resulted Specimen: Blood from Antecubital, Left Updated: 04/07/23 2035             Radiology:  CT Chest Abdomen Pelvis Without Contrast (XPD)  Narrative: EXAMINATION:  CT CHEST ABDOMEN PELVIS WITHOUT CONTRAST(XPD)    CLINICAL HISTORY:  Sepsis;    TECHNIQUE:  Multidetector axial images were obtained from the thoracic inlet through the greater trochanters without the administration of IV contrast.    Dose length product of 336 mGycm. Automated exposure control was utilized to minimize radiation dose.    COMPARISON:  CT chest March 23, 2023    CHEST FINDINGS:    There is no significant difference extensive soft tissue inflammation about the right shoulder, right axilla and the right anterior chest wall.  There are right axillary enlarged lymph nodes without significant difference in overall size and count.  There is right breast asymmetric density and skin thickening.    There is about similar size of moderate to large right pleural effusion and right lower lung zone collapse consolidation with air bronchograms.  Right upper lung lobe hypoventilatory changes.  Left lung and the left pleural space are unremarkable.  No pericardial effusion.  There nonenlarged mediastinal lymph nodes.  Thoracic aorta is without aneurysmal dilatation.  Cardiac chamber size is within normal limits.  Venous line terminates within distal superior vena cava.    ABDOMINAL FINDINGS:    Within limitation noncontrast technique, no acute findings liver, pancreas or the spleen identified.  Gallbladder lumen contains dense calcified gallstones without thickened wall and there is no apparent dilatation of ducts.  Adrenals are not enlarged.  No renal calculi or acute obstructive uropathy.  Kidneys cortical volume is adequate.    Stomach is decompressed.  No abnormal dilatation of  loops of small bowel.  Appendix is not visualized.  There is irregular mural thickening of the cecum and the ascending colon with surrounding inflammatory changes. There are also adjacent multiple up to 1.2 cm lymph nodes.  The remaining colon is nondistended without pericolonic acute standings.  No bowel obstruction.  No free air or free fluid.    PELVIC FINDINGS:    Urinary bladder wall is slightly thickened.  Uterus is not enlarged in size.  Endometrium is not delineated.  No pelvic free fluid.    There are heterogeneous sclerotic changes of the sternum.  Several thoracolumbar vertebrae show variable degree of sclerotic changes consistent with metastatic involvement.  Within thoracic spine the most dominant involvement is of T9 and T10 vertebral bodies.  Within lumbar spine the most apparent involvement is of L3 vertebral body.  There is also heterogeneous sclerosis of bony pelvis.  Impression: 1.  Unchanged soft tissue inflammations around the right shoulder, right axilla and right chest wall.  Right breast asymmetric density and skin thickening.  Please correlate with mammography findings.    2.  Unchanged right axillary lymphadenopathy.    3.  Persistent right pleural effusion and right lower lung lobe collapse consolidation.  4.  Cecum and ascending colon irregular mural thickening and surrounding inflammatory change may be related to colitis with concern for malignancy.  There are pericecal multiple nonenlarged lymph nodes.. 5.  Extensive skeletal sclerotic lesion consistent with metastasis.    Electronically signed by: Ryley Reno  Date:    04/07/2023  Time:    20:56  X-Ray Chest 1 View  Narrative: EXAMINATION:  XR CHEST 1 VIEW    CLINICAL HISTORY:  fatigue;    TECHNIQUE:  One view    COMPARISON:  March 23, 2023..    FINDINGS:  Cardiopericardial silhouette appearance is similar.  There is right pleural effusion and right mid to lower lung zone atelectasis and/or infiltrates.  These findings are new since  the previous exam.  Low volume lungs with perhaps slight congestive process.  There is no pneumothorax.  Left chest implanted tunnel vesta catheter terminates within the superior vena cava.  Impression: New right pleural effusion and right lower lung zone atelectasis and/or infiltrates.    Electronically signed by: Ryley Reno  Date:    04/07/2023  Time:    20:12      Scheduled Med:   enoxaparin  40 mg Subcutaneous Daily    famotidine (PF)  20 mg Intravenous Q12H    morphine  15 mg Oral BID    piperacillin-tazobactam (ZOSYN) IVPB  4.5 g Intravenous Q8H    sodium chloride 0.9%  10 mL Intravenous Q6H        Continuous Infusions:       PRN Meds:  loperamide, meclizine, melatonin, ondansetron, oxyCODONE, sodium chloride 0.9%, Flushing PICC Protocol **AND** sodium chloride 0.9% **AND** sodium chloride 0.9%     Nutrition Status:      Assessment/Plan:  Right pleural effusion  Right lower lobe pneumonia  Colitis-c-diff  N/v  Breast cancer mets to bone  Pancytopenia    Plan    Cont zosyn  Zofran/pain control  Labs in am  Add metronidazole  Consult heme/onc    Dvt proph: lovenox  Gi proph: pepcid            All diagnosis and differential diagnosis have been reviewed; assessment and plan has been documented; I have personally reviewed the labs and test results that are presently available; I have reviewed the patients medication list; I have reviewed the consulting providers response and recommendations. I have reviewed or attempted to review medical records based upon their availability      _____________________________________________________________________            Abdiaziz Lugo MD   04/08/2023

## 2023-04-08 NOTE — PROCEDURES
"Esme Marie is a 57 y.o. female patient.    Temp: 99.1 °F (37.3 °C) (04/07/23 1901)  Pulse: (!) 122 (04/08/23 0400)  Resp: 12 (04/08/23 0400)  BP: 98/68 (04/08/23 0400)  SpO2: 98 % (04/08/23 0400)  Weight: 102.9 kg (226 lb 13.7 oz) (04/07/23 1859)  Height: 5' 5" (165.1 cm) (04/07/23 1859)    PICC  Date/Time: 4/8/2023 5:34 AM  Performed by: Shelly Brito RN  Consent Done: Yes  Time out: Immediately prior to procedure a time out was called to verify the correct patient, procedure, equipment, support staff and site/side marked as required  Indications: med administration and vascular access  Anesthesia: local infiltration  Local anesthetic: lidocaine 1% without epinephrine  Anesthetic Total (mL): 4  Preparation: skin prepped with ChloraPrep  Skin prep agent dried: skin prep agent completely dried prior to procedure  Sterile barriers: all five maximum sterile barriers used - cap, mask, sterile gown, sterile gloves, and large sterile sheet  Hand hygiene: hand hygiene performed prior to central venous catheter insertion  Location details: left basilic  Catheter type: single lumen  Catheter size: 4 Fr  Catheter Length: 18cm    Ultrasound guidance: yes  Vessel Caliber: medium and patent, compressibility normal  Needle advanced into vessel with real time Ultrasound guidance.  Guidewire confirmed in vessel.  Sterile sheath used.  Number of attempts: 1  Post-procedure: blood return through all ports, sterile dressing applied and chlorhexidine patch    Assessment: successful placement  Complications: none  Comments: Picc ordered for pt for difficulty/unable to obtain iv access, abx therapy.  Pt has hx of lymph node removal for breast ca to right side with noted right arm swelling.  Pt currently has mediport to left cw and will be unable to place picc, midline requested instead of picc.        Shelly brito rn  4/8/2023    "

## 2023-04-08 NOTE — ED PROVIDER NOTES
Source of History:  Patient and daughter, no limitations    Chief complaint:  Fatigue      HPI:  Esme Marie is a 57 y.o. female presenting with Fatigue       58yo complex medical history including stage IV triple negative breast cancer with Mets to brain and bone currently on chemotherapy directed by Dr. Brooke, last dose was 03/22/2023, presents today for generalized weakness and diarrhea x5 days, the diarrhea is described as watery and not dark, +vomiting, also complains of increasing shortness of breath and dry cough  Patient complains of weakness. Onset was 5 days ago, with worsening course since that time. Patient was not doing anything at time of onset. The duration of the episode Constant and continues. Patient denies Chest pain and abdominal pain. Symptoms are exacerbated by nothing. Symptoms are relieved by nothing. Associated symptoms include nausea, vomiting, and diarrhea. The patient denies confusion, fever, slurred speech, focal weakness, focal sensory loss, and seizure activity.        Review of Systems   Constitutional symptoms:  Negative except as documented in HPI.   Skin symptoms:  Negative except as documented in HPI.   HEENT symptoms:  Negative except as documented in HPI.   Respiratory symptoms:  Negative except as documented in HPI.   Cardiovascular symptoms:  Negative except as documented in HPI.   Gastrointestinal symptoms:  Negative except as documented in HPI.    Genitourinary symptoms:  Negative except as documented in HPI.   Musculoskeletal symptoms:  Negative except as documented in HPI.   Neurologic symptoms:  Negative except as documented in HPI.   Psychiatric symptoms:  Negative except as documented in HPI.   Allergy/immunologic symptoms:  Negative except as documented in HPI.             Additional review of systems information: All other systems reviewed and otherwise negative.      Review of patient's allergies indicates:  No Known Allergies    PMH:  As per HPI and  "below:    Past Medical History:   Diagnosis Date    Breast cancer     Hyperlipidemia     Knee pain     Menopausal flushing     Menopause 3/2018    Normocytic normochromic anemia     Vitamin D deficiency         Family History   Problem Relation Age of Onset    Hypertension Mother     Diabetes Mother     Arthritis Mother     Lung cancer Father 62       Past Surgical History:   Procedure Laterality Date    PLACEMENT, MEDIPORT N/A 01/23/2023    Procedure: Placement, Mediport;  Surgeon: Silva Shields MD;  Location: [facility];  Service: General;  Laterality: N/A;    TUBAL LIGATION         Social History     Tobacco Use    Smoking status: Never    Smokeless tobacco: Never   Substance Use Topics    Alcohol use: Yes     Comment: occassionally    Drug use: Never       Patient Active Problem List   Diagnosis    Mixed hyperlipidemia    Obesity    Vitamin D deficiency    Prediabetes    Bilateral primary osteoarthritis of knee    Normocytic normochromic anemia    Primary osteoarthritis of left knee    BMI 35.0-35.9,adult    Breast pain, left    Axillary adenopathy    Cellulitis of right breast    Lymphadenopathy, supraclavicular    Skin infection    Breast cancer metastasized to axillary lymph node, right    Breast cancer metastasized to bone, unspecified laterality    Encounter for chemotherapy management    Chemotherapy-induced nausea    Encounter for prevention of neutropenia due to chemotherapy    Cancer related pain    Lymphedema        Physical Exam:    BP 98/65   Pulse (!) 119   Temp 99.1 °F (37.3 °C) (Oral)   Resp 15   Ht 5' 5" (1.651 m)   Wt 102.9 kg (226 lb 13.7 oz)   SpO2 98%   BMI 37.75 kg/m²     Nursing note and vital signs reviewed.    General:  Alert, ill-appearing, pleasant mid-age female  Skin: pale, right breast skin changes, No cyanosis  HEENT: Normocephalic and atraumatic, Vision unchanged, Pupils symmetric, No icterus , Nasal mucosa is pink and moist  Cardiovascular:  tachycardic rate and regular " rhythm  Chest Wall: No deformity, equal chest rise, trachea mild deviating rightward, right breast lymphadenopathy   Respiratory:  absent breath sounds right side, respirations are mildly labored  Musculoskeletal:  No deformity, Normal perfusion to all extremities, lymphedema RUE  Gastrointestinal:  Soft, Non distended, moderate tenderness diffusely  Neurological:  Alert and oriented, normal motor observed, normal speech observed.    Psychiatric:  Cooperative, appropriate mood & affect.        Labs that have been ordered have been independently reviewed and interpreted by myself.     Old Chart Reviewed.      Initial Impression/ Differential Dx:  Electrolyte abnormality, hypoglycemia, infectious causes, endocrine abnormalities, medication side effect, dehydration, anemia    Viral gastroenteritis, clostridium difficile, colitis, bacterial enteritis, diverticulitis, malignancy, paraneoplastic disorder, irritable bowel disorder, inflammatory bowel disease, autoimmune disease, allergy, medication side effect, GI bleed        MDM:      Reviewed Nurses Note.    Reviewed Pertinent old records.    Orders Placed This Encounter    Critical Care    Pulse Oximeter    Blood Culture #1 **CANNOT BE ORDERED STAT**    Blood Culture #2 **CANNOT BE ORDERED STAT**    Clostridium Diff Toxin, A & B, EIA    Stool Culture **CANNOT BE ORDERED STAT**    X-Ray Chest 1 View    CT Chest Abdomen Pelvis Without Contrast (XPD)    CBC Auto Differential    Comprehensive Metabolic Panel    Protime-INR    APTT    Troponin I    Magnesium    BNP    Lactic Acid, Plasma    Urinalysis, Reflex to Urine Culture    COVID/FLU A&B PCR    CBC with Differential    Lipase    Manual Differential    CBC auto differential    Comprehensive metabolic panel    FECAL LEUKOCYTES - LACTOFERRIN ON  STOOL    Occult Blood, Stool, Diagnostic (1-3)    Occult blood x 3, stool    CBC with Differential    Diet heart healthy    Vital signs    Intake and output    Notify physician      Place sequential compression device    Cardiac Monitoring - Adult    Central line insertion    Full code    Inpatient consult to Hematology/Oncology    Inpatient consult to Social Work/Case Management    Special contact c diff isolation status    Dietary nutrition supplements Boost Plus - Any flavor; BID    POCT ARTERIAL BLOOD GAS Blood Gas    Pulse Oximetry Q4H    EKG 12-lead    Type & Screen    Insert peripheral IV    Admit to Inpatient    piperacillin-tazobactam (ZOSYN) 4.5 g in dextrose 5 % in water (D5W) 5 % 100 mL IVPB (MB+)    sodium chloride 0.9% flush 10 mL    melatonin tablet 6 mg    enoxaparin injection 40 mg    piperacillin-tazobactam (ZOSYN) 4.5 g in dextrose 5 % in water (D5W) 5 % 100 mL IVPB (MB+)    loperamide capsule 2 mg    famotidine (PF) injection 20 mg    ondansetron injection 4 mg    morphine 12 hr tablet 15 mg    oxyCODONE immediate release tablet 10 mg    hydrocortisone sodium succinate injection 100 mg    IP VTE HIGH RISK PATIENT    Progressive Mobility Protocol (mobilize patient to their highest level of functioning at least twice daily)    Discontinue CDiff after 24hours if specimen not collected    Discontinue CDiff after 24hours if specimen not collected                    Labs Reviewed   COMPREHENSIVE METABOLIC PANEL - Abnormal; Notable for the following components:       Result Value    Glucose Level 136 (*)     Protein Total 6.0 (*)     Albumin Level 2.6 (*)     Albumin/Globulin Ratio 0.8 (*)     Alanine Aminotransferase 96 (*)     Aspartate Aminotransferase 62 (*)     All other components within normal limits   PROTIME-INR - Abnormal; Notable for the following components:    PT 14.8 (*)     INR 1.18 (*)     All other components within normal limits   CBC WITH DIFFERENTIAL - Abnormal; Notable for the following components:    WBC 1.6 (*)     RBC 2.98 (*)     Hgb 8.1 (*)     Hct 27.1 (*)     MCHC 29.9 (*)     Platelet 76 (*)     MPV 11.5 (*)     All other components within normal  limits   MANUAL DIFFERENTIAL - Abnormal; Notable for the following components:    Neut Man 43 (*)     Abs Neut calc 0.816 (*)     Abs Lymp 0.512 (*)     RBC Morph Abnormal (*)     Anisocyte 2+ (*)     Poik Slight (*)     Polychrom Slight (*)     Hypochrom 2+ (*)     Platelet Est Decreased (*)     All other components within normal limits   TYPE & SCREEN - Abnormal; Notable for the following components:    Indirect [place] GEL POS (*)     All other components within normal limits   APTT - Normal   TROPONIN I - Normal   MAGNESIUM - Normal   B-TYPE NATRIURETIC PEPTIDE - Normal   LACTIC ACID, PLASMA - Normal   COVID/FLU A&B PCR - Normal    Narrative:     The Xpert Xpress SARS-CoV-2/FLU/RSV plus is a rapid, multiplexed real-time PCR test intended for the simultaneous qualitative detection and differentiation of SARS-CoV-2, Influenza A, Influenza B, and respiratory syncytial virus (RSV) viral RNA in either nasopharyngeal swab or nasal swab specimens.         LIPASE - Normal   BLOOD CULTURE OLG   BLOOD CULTURE OLG   STOOL CULTURE OLG   CLOSTRIDIUM DIFFICILE TOXIN A AND B, EIA   CBC W/ AUTO DIFFERENTIAL    Narrative:     The following orders were created for panel order CBC Auto Differential.  Procedure                               Abnormality         Status                     ---------                               -----------         ------                     CBC with Differential[[id number]]        Abnormal            Final result               Manual Differential[[id number]]          Abnormal            Final result                 Please view results for these tests on the individual orders.   URINALYSIS, REFLEX TO URINE CULTURE   OCCULT BLOOD,STOOL,DIAGNOSTIC (1-3)    Narrative:     The following orders were created for panel order Occult Blood, Stool, Diagnostic (1-3).  Procedure                               Abnormality         Status                     ---------                               -----------          ------                     Occult blood x 3, stool[[id number]]                                                       Please view results for these tests on the individual orders.   OCCULT BLOOD X 3, STOOL   CBC W/ AUTO DIFFERENTIAL    Narrative:     The following orders were created for panel order CBC auto differential.  Procedure                               Abnormality         Status                     ---------                               -----------         ------                     CBC with Differential[[id number]]                                                         Please view results for these tests on the individual orders.   COMPREHENSIVE METABOLIC PANEL   CBC WITH DIFFERENTIAL   FECAL LEUKOCYTES - LACTOFERRIN ON  STOOL          CT Chest Abdomen Pelvis Without Contrast (XPD)   Final Result      1.  Unchanged soft tissue inflammations around the right shoulder, right axilla and right chest wall.  Right breast asymmetric density and skin thickening.  Please correlate with mammography findings.      2.  Unchanged right axillary lymphadenopathy.      3.  Persistent right pleural effusion and right lower lung lobe collapse consolidation.  4.  Cecum and ascending colon irregular mural thickening and surrounding inflammatory change may be related to colitis with concern for malignancy.  There are pericecal multiple nonenlarged lymph nodes.. 5.  Extensive skeletal sclerotic lesion consistent with metastasis.         Electronically signed by: Ryley Reno   Date:    04/07/2023   Time:    20:56      X-Ray Chest 1 View   ED Interpretation   Abnormal   A chest X-Ray was ordered. My reading of this film is LARGE right plural effusion. (Pending review by Radiologist.)        Final Result      New right pleural effusion and right lower lung zone atelectasis and/or infiltrates.         Electronically signed by: Ryley Reno   Date:    04/07/2023   Time:    20:12           Admission on 04/07/2023   Component Date  Value Ref Range Status    Sodium Level 04/07/2023 136  136 - 145 mmol/L Final    Potassium Level 04/07/2023 4.1  3.5 - 5.1 mmol/L Final    Chloride 04/07/2023 99  98 - 107 mmol/L Final    Carbon Dioxide 04/07/2023 28  22 - 29 mmol/L Final    Glucose Level 04/07/2023 136 (H)  74 - 100 mg/dL Final    Blood Urea Nitrogen 04/07/2023 10.8  9.8 - 20.1 mg/dL Final    Creatinine 04/07/2023 0.69  0.55 - 1.02 mg/dL Final    Calcium Level Total 04/07/2023 8.6  8.4 - 10.2 mg/dL Final    Protein Total 04/07/2023 6.0 (L)  6.4 - 8.3 gm/dL Final    Albumin Level 04/07/2023 2.6 (L)  3.5 - 5.0 g/dL Final    Globulin 04/07/2023 3.4  2.4 - 3.5 gm/dL Final    Albumin/Globulin Ratio 04/07/2023 0.8 (L)  1.1 - 2.0 ratio Final    Bilirubin Total 04/07/2023 0.5  <=1.5 mg/dL Final    Alkaline Phosphatase 04/07/2023 147  40 - 150 unit/L Final    Alanine Aminotransferase 04/07/2023 96 (H)  0 - 55 unit/L Final    Aspartate Aminotransferase 04/07/2023 62 (H)  5 - 34 unit/L Final    eGFR 04/07/2023 >60  mls/min/1.73/m2 Final    PT 04/07/2023 14.8 (H)  11.7 - 14.5 seconds Final    INR 04/07/2023 1.18 (L)  2.00 - 3.00 Final    PTT 04/07/2023 28.8  23.4 - 33.9 seconds Final    Troponin-I 04/07/2023 <0.010  0.000 - 0.045 ng/mL Final    Magnesium Level 04/07/2023 1.90  1.60 - 2.60 mg/dL Final    Natriuretic Peptide 04/07/2023 18.1  <=100.0 pg/mL Final    Lactic Acid Level 04/07/2023 1.2  0.5 - 2.2 mmol/L Final    Influenza A PCR 04/07/2023 Not Detected  Not Detected Final    Influenza B PCR 04/07/2023 Not Detected  Not Detected Final    SARS-CoV-2 PCR 04/07/2023 Not Detected  Not Detected, Negative, Invalid Final    Group & Rh 04/07/2023 O NEG   Final    Indirect Iona GEL 04/07/2023 POS (A)   Final    Specimen Outdate 04/07/2023 04/10/2023 23:59   Final    WBC 04/07/2023 1.6 (LL)  4.5 - 11.5 x10(3)/mcL Final    RBC 04/07/2023 2.98 (L)  4.20 - 5.40 x10(6)/mcL Final    Hgb 04/07/2023 8.1 (L)  12.0 - 16.0 g/dL Final    Hct 04/07/2023 27.1 (L)  37.0 -  47.0 % Final    MCV 04/07/2023 90.9  80.0 - 94.0 fL Final    MCH 04/07/2023 27.2  27.0 - 31.0 pg Final    MCHC 04/07/2023 29.9 (L)  33.0 - 36.0 g/dL Final    RDW 04/07/2023 17.0  11.5 - 17.0 % Final    Platelet 04/07/2023 76 (L)  130 - 400 x10(3)/mcL Final    MPV 04/07/2023 11.5 (H)  7.4 - 10.4 fL Final    Lipase Level 04/07/2023 8  <=60 U/L Final    pH, Arterial 04/07/2023 7.41  7.35 - 7.45 Final    pCO2, Arterial 04/07/2023 49 (A)  35 - 45 mm Hg Final    PO2 ARTERIAL 04/07/2023 96   Final    Base Excess, Arterial 04/07/2023 7.0 (A)  -2.0 - 2.0 mmol/L Final    HCO3, Arterial 04/07/2023 31.6 (A)  18.0 - 23.0 MMOL/L Final    CO2 TOTAL 04/07/2023 33   Final    O2 Sat, Arterial 04/07/2023 97  95 - 98 % Final    Neut Man 04/07/2023 43 (L)  47 - 80 % Final    Band Neutrophil Man 04/07/2023 8  0 - 11 % Final    Lymph Man 04/07/2023 32  13 - 40 % Final    Monocyte Man 04/07/2023 11  2 - 11 % Final    Abn Lymph Man 04/07/2023 6  % Final    NRBC Man 04/07/2023 11  % Final    Abs Neut calc 04/07/2023 0.816 (L)  2.1 - 9.2 x10(3)/mcL Final    Abs Mono 04/07/2023 0.176  0.1 - 1.3 x10(3)/mcL Final    Abs Lymp 04/07/2023 0.512 (L)  0.6 - 4.6 x10(3)/mcL Final    RBC Morph 04/07/2023 Abnormal (A)  Normal Final    Anisocyte 04/07/2023 2+ (A)  (none) Final    Poik 04/07/2023 Slight (A)  (none) Final    Polychrom 04/07/2023 Slight (A)  (none) Final    Hypochrom 04/07/2023 2+ (A)  (none) Final    Platelet Est 04/07/2023 Decreased (A)  Normal, Adequate Final       Imaging Results              CT Chest Abdomen Pelvis Without Contrast (XPD) (Final result)  Result time 04/07/23 20:56:07      Final result by Ryley Reno MD (04/07/23 20:56:07)                   Impression:      1.  Unchanged soft tissue inflammations around the right shoulder, right axilla and right chest wall.  Right breast asymmetric density and skin thickening.  Please correlate with mammography findings.    2.  Unchanged right axillary lymphadenopathy.    3.   Persistent right pleural effusion and right lower lung lobe collapse consolidation.  4.  Cecum and ascending colon irregular mural thickening and surrounding inflammatory change may be related to colitis with concern for malignancy.  There are pericecal multiple nonenlarged lymph nodes.. 5.  Extensive skeletal sclerotic lesion consistent with metastasis.      Electronically signed by: Ryley Reno  Date:    04/07/2023  Time:    20:56               Narrative:    EXAMINATION:  CT CHEST ABDOMEN PELVIS WITHOUT CONTRAST(XPD)    CLINICAL HISTORY:  Sepsis;    TECHNIQUE:  Multidetector axial images were obtained from the thoracic inlet through the greater trochanters without the administration of IV contrast.    Dose length product of 336 mGycm. Automated exposure control was utilized to minimize radiation dose.    COMPARISON:  CT chest March 23, 2023    CHEST FINDINGS:    There is no significant difference extensive soft tissue inflammation about the right shoulder, right axilla and the right anterior chest wall.  There are right axillary enlarged lymph nodes without significant difference in overall size and count.  There is right breast asymmetric density and skin thickening.    There is about similar size of moderate to large right pleural effusion and right lower lung zone collapse consolidation with air bronchograms.  Right upper lung lobe hypoventilatory changes.  Left lung and the left pleural space are unremarkable.  No pericardial effusion.  There nonenlarged mediastinal lymph nodes.  Thoracic aorta is without aneurysmal dilatation.  Cardiac chamber size is within normal limits.  Venous line terminates within distal superior vena cava.    ABDOMINAL FINDINGS:    Within limitation noncontrast technique, no acute findings liver, pancreas or the spleen identified.  Gallbladder lumen contains dense calcified gallstones without thickened wall and there is no apparent dilatation of ducts.  Adrenals are not enlarged.  No  renal calculi or acute obstructive uropathy.  Kidneys cortical volume is adequate.    Stomach is decompressed.  No abnormal dilatation of loops of small bowel.  Appendix is not visualized.  There is irregular mural thickening of the cecum and the ascending colon with surrounding inflammatory changes. There are also adjacent multiple up to 1.2 cm lymph nodes.  The remaining colon is nondistended without pericolonic acute standings.  No bowel obstruction.  No free air or free fluid.    PELVIC FINDINGS:    Urinary bladder wall is slightly thickened.  Uterus is not enlarged in size.  Endometrium is not delineated.  No pelvic free fluid.    There are heterogeneous sclerotic changes of the sternum.  Several thoracolumbar vertebrae show variable degree of sclerotic changes consistent with metastatic involvement.  Within thoracic spine the most dominant involvement is of T9 and T10 vertebral bodies.  Within lumbar spine the most apparent involvement is of L3 vertebral body.  There is also heterogeneous sclerosis of bony pelvis.                                       X-Ray Chest 1 View (Final result)  Result time 04/07/23 20:12:58      Final result by Ryley Reno MD (04/07/23 20:12:58)                   Impression:      New right pleural effusion and right lower lung zone atelectasis and/or infiltrates.      Electronically signed by: Ryley Reno  Date:    04/07/2023  Time:    20:12               Narrative:    EXAMINATION:  XR CHEST 1 VIEW    CLINICAL HISTORY:  fatigue;    TECHNIQUE:  One view    COMPARISON:  March 23, 2023..    FINDINGS:  Cardiopericardial silhouette appearance is similar.  There is right pleural effusion and right mid to lower lung zone atelectasis and/or infiltrates.  These findings are new since the previous exam.  Low volume lungs with perhaps slight congestive process.  There is no pneumothorax.  Left chest implanted tunnel vesta catheter terminates within the superior vena cava.                         Wet Read by Flako Gray DO (04/07/23 19:53:39, [facility] - Emergency Dept, Emergency Medicine) Flagged as Abnormal    A chest X-Ray was ordered. My reading of this film is LARGE right plural effusion. (Pending review by Radiologist.)                                        ECG Results              EKG 12-lead (Preliminary result)  Result time 04/07/23 19:53:02      Wet Read by Flako Gray DO (04/07/23 19:53:02, [facility] - Emergency Dept, Emergency Medicine)    Independent ECG Interpretation:    Sinus tach at rate of 120. Normal intervals. Normal QRS. Nonspecific ST or T wave abnormalities. Overall impression: Abnormal                                                ED Course as of 04/08/23 0207 Fri Apr 07, 2023 1953 CXR reviewed, fluids D/C, lasix considered however will delay till w/u complete as pt is comfortable on 2L NC [MP]   2030 WBC(!!): 1.6 [MP]   2030 Hemoglobin(!): 8.1 [MP]   2030 Hematocrit(!): 27.1 [MP]   2030 Platelets(!): 76 [MP]   2033 Lactate, Matthew: 1.2 [MP]   2104 BNP: 18.1 [MP]   2137 Dr. Gray will admit the patient to the hospital.  We discussed the care this far and patient's condition and results of testing.  Temporary bridge orders placed to expedite patient's transition of care.     [MP]      ED Course User Index  [MP] Flako Gray DO                    Critical Care    Date/Time: 4/7/2023 10:01 PM  Performed by: Flako Gray DO  Authorized by: Sabrina Davis MD   Total critical care time (exclusive of procedural time) : 50 minutes  Critical care was necessary to treat or prevent imminent or life-threatening deterioration of the following conditions: metabolic crisis and circulatory failure.  Critical care was time spent personally by me on the following activities: development of treatment plan with patient or surrogate, discussions with primary provider, evaluation of patient's response to treatment,  examination of patient, obtaining history from patient or surrogate, ordering and performing treatments and interventions, ordering and review of laboratory studies, ordering and review of radiographic studies, pulse oximetry, re-evaluation of patient's condition and review of old charts.         Diagnostic Impression:    1. Generalized weakness    2. Tachycardia    3. Pancytopenia due to antineoplastic chemotherapy    4. Acute respiratory failure with hypoxia    5. Pleural effusion on right    6. Colitis    7. Triple negative breast cancer         ED Disposition Condition    Transfer to Another Facility Stable                       Flako Gray, DO  04/07/23 2202       Flako Gray, DO  04/08/23 0207

## 2023-04-09 NOTE — CONSULTS
Subjective:       Patient ID: Esme Marie is a 57 y.o. female.    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 Elliott  Carbo/[facility] -  1/31/23 - 3/22/23    HPI:   58yo F who presented in January 2023 for evaluation of R Breast Cancer. She noticed R breast skin changes in December '22 just prior to her already planned annual MMG. Her breast changes including skin changes, darkening, nipple retraction, and breast firmness has developed rapidly over the last month since diagnosis - made worse after her MMG and Breast MRI. She was found to have extensive R breast and axilla abnormality with breast imaging with MMG/US and Breast MRI. 1/26/23 staing PET with evidence of Diffuse FDG uptake throughout the right breast with overlying skin thickening, right axillary lymphadenopathy and uptake in the right chest wall musculature.Bilateral hypermetabolic cervical lymphadenopathy.Diffuse hypermetabolic osseous metastatic disease. She then underwent Brain MRI which showed evidence of metastatic disease.   Since that time she has begun chemotherapy for her metastatic Triple negative breast cancer. She began Carboplatin + Gemcitabine in February 2023. She underwent brain radiation for her known brain mets in February '23.  Will be starting zometa for her osseous disease in March '23.     Interval History:  Patient's daughter called on Friday, 04/07/2023 stating that the patient was having significant diarrhea, 5-6 stools a day.  They presented to [facility] Emergency Department.  A CT scan of the chest, abdomen, and pelvis on 04/07/2023 showed unchanged soft tissue inflammation around the right shoulder, right axilla, and right chest wall with right breast asymmetric density and skin thickening with unchanged right axillary lymphadenopathy,  persistent right pleural effusion and right lower lung collapse consolidation.  There was some irregular mural thickening of the cecum and ascending colon maybe related to colitis with concern for malignancy.  There were pericecal multiple nonenlarged lymph nodes and extensive skeletal sclerotic lesions consistent with metastasis.  She was diagnosed with C diff on 2023.  She is on appropriate treatment and fluid resuscitation and doing much better.      OB/GYN History:  Age at Menarche Onset: 14  Menopausal Status: postmenopausal, LMP: 2019  Hysterectomy/Oophorectomy: menopause, at age 54  Hormonal birth control (duration): Yes starting at age 17 used for 2 years.   Pregnancy History:   Age at first live birth: 16  Hormone Replacement Therapy: No, none  Patient did not breast feed      Past Medical History:   Diagnosis Date    Breast cancer     Hyperlipidemia     Knee pain     Menopausal flushing     Menopause 3/2018    Normocytic normochromic anemia     Vitamin D deficiency       Past Surgical History:   Procedure Laterality Date    PLACEMENT, MEDIPORT N/A 2023    Procedure: Placement, Mediport;  Surgeon: Silva Shields MD;  Location: [facility];  Service: General;  Laterality: N/A;    TUBAL LIGATION       Social History     Socioeconomic History    Marital status: Single   Occupational History    Occupation: [employer]   Tobacco Use    Smoking status: Never    Smokeless tobacco: Never   Substance and Sexual Activity    Alcohol use: Yes     Comment: occassionally    Drug use: Never    Sexual activity: Yes     Partners: Male     Birth control/protection: None     Social Determinants of Health     Financial Resource Strain: Low Risk     Difficulty of Paying Living Expenses: Not hard at all   Food Insecurity: No Food Insecurity    Worried About Running Out of Food in the Last Year: Never true    Ran Out of Food in the Last Year: Never true   Transportation Needs: No Transportation Needs     Lack of Transportation (Medical): No    Lack of Transportation (Non-Medical): No   Physical Activity: Inactive    Days of Exercise per Week: 0 days    Minutes of Exercise per Session: 0 min   Stress: No Stress Concern Present    Feeling of Stress : Only a little   Social Connections: Moderately Isolated    Frequency of Communication with Friends and Family: More than three times a week    Frequency of Social Gatherings with Friends and Family: More than three times a week    Attends Buddhism Services: 1 to 4 times per year    Active Member of Clubs or Organizations: No    Attends Club or Organization Meetings: Never    Marital Status: Never    Housing Stability: Unknown    Unable to Pay for Housing in the Last Year: No    Unstable Housing in the Last Year: No      Family History   Problem Relation Age of Onset    Hypertension Mother     Diabetes Mother     Arthritis Mother     Lung cancer Father 62      Review of patient's allergies indicates:  No Known Allergies   Review of Systems   Constitutional:  Negative for appetite change and unexpected weight change.   HENT:  Negative for mouth sores.    Eyes:  Negative for visual disturbance.   Respiratory:  Negative for cough and shortness of breath.    Cardiovascular:  Negative for chest pain.   Gastrointestinal:  Negative for abdominal pain and diarrhea.   Genitourinary:  Negative for frequency.   Musculoskeletal:  Positive for back pain.   Integumentary:  Negative for rash.   Neurological:  Negative for headaches.   Hematological:  Positive for adenopathy.   Psychiatric/Behavioral:  The patient is not nervous/anxious.        Objective:      Vitals:    04/09/23 1220   BP: 110/72   Pulse: 97   Resp: 18   Temp: 97.8 °F (36.6 °C)       Physical Exam  Constitutional:       General: She is not in acute distress.     Appearance: Normal appearance. She is not ill-appearing.   HENT:      Head: Normocephalic and atraumatic.      Nose: Nose normal.      Mouth/Throat:       Mouth: Mucous membranes are moist.      Pharynx: Oropharynx is clear.   Eyes:      Extraocular Movements: Extraocular movements intact.      Conjunctiva/sclera: Conjunctivae normal.   Pulmonary:      Effort: Pulmonary effort is normal. No respiratory distress.   Musculoskeletal:         General: No swelling.      Right lower leg: No edema.      Left lower leg: No edema.   Lymphadenopathy:      Comments: RUE lymphedema   Skin:     General: Skin is dry.      Coloration: Skin is not jaundiced.   Neurological:      General: No focal deficit present.      Mental Status: She is alert and oriented to person, place, and time. Mental status is at baseline.     Chest - Right breast with skin erythema, thickening, and dimpling consistent with inflammatory changes throughout the entire breast. Breast is firm throughout. Nipple retraction. Enlarging area of skin breakdown around nipple.  R arm lymphedema severe with extension to her R upper chest.     LABS AND IMAGING REVIEWED IN EPIC    Imagin. 2022 BL DG MG/ R US BREAST LIMITED at [facility]- which revealed in R breast, a developing asymmetry extending from the retroareolar area to involve all quadrants of the right breast, anterior to posterior depth, measuring approximately 12 cm.  There is a 3.3 cm oval, obscured, equal density mass-like component in the lower central right breast, middle to posterior depth.  Triangle marker is noted on the upper outer right breast demarcating the site of clinical concern with underlying 1.7 cm focal asymmetry in the upper outer right breast, posterior depth.  There is diffuse right breast skin thickening most prominent in the periareolar breast.  There is right nipple retraction.  There is a 3.8 cm oval, high density mass/abnormal lymph node with indistinct margin in the right axilla.   On R US, there is a 1.5 x 0.8 x 1.7 cm irregular, non parallel, heterogeneous dermal-subdermal mass with indistinct margin, minimal internal  vascularity, and some posterior shadowing in the upper outer right breast at 10 o'clock 12 cm FN,  the site of patient indicated palpable lump, correlating with the mammographic focal asymmetry.  There is associated skin thickening of approximately 0.4 cm.  There is adjacent heterogeneous, echogenic signal within the subdermal fat measuring approximately 2.4 x 1.2 x 2.1 cm.   There is a 2.2 x 1.7 x 3.3 cm irregular, non parallel, hypoechoic mass with angular and indistinct margin, peripheral vascularity, and some posterior enhancement in the lower central right breast at 6 o'clock 9 cm FN, correlating with the mass-like mammographic finding.  There is skin thickening measuring approximately 0.8 cm and edema in the retroareolar right breast with loss of the normal soft tissue planes.  There is associated hyperemia.   There is a 5.3 x 3.9 x 5.5 cm irregular, non parallel, hypoechoic mass with angular and indistinct margins, peripheral vascularity, and posterior shadowing in the upper outer right breast at 10 o'clock 8 cm FN..     There is a 3.6 x 2.1 x 3.8 cm oval, hypoechoic mass/abnormal lymph node with indistinct margin, minimal internal vascularity, and posterior enhancement in the right axilla at 10 o'clock 14 cm FN. BIRADS-4 suspicious; biopsy recommended.       2. 2023 US SOFT TISSUE HEAD NECK AND THYROID at [facility]- which revealed at area of palpable complaint corresponds with a heterogeneous mixed echogenicity 1.1 x 1 x 0.6 cm lesion with internal color Doppler signal.  Deeper to this there is a 2nd, larger 3.5 x 2.7 x 2.1 cm mixed cystic and solid lesion in the right supraclavicular space.  Cystic components may be related to necrosis or suppurative change.     MRI Breast 23  Findings consistent with advanced stage, aggressive right breast inflammatory carcinoma (particularly given rapid clinical onset) includin. Extensive right breast disease involving the right nipple (with retraction) and all  quadrants with mass and non-mass enhancement measuring approximately 17.3 (AP) x 8.3 (TR) x 12 (CC) cm.  There is diffuse right breast skin thickening with dermal-subdermal enhancement along the lower inner and lower central right breast, anterior to posterior depth, and upper outer right breast, middle to posterior depth, likely correlating with the site of palpable lump.  2. Abnormal signal extending posteriorly to involve the right pectoralis major muscle (with asymmetric tenting) and inferiorly and laterally to involve the soft tissue overlying the right chest/upper abdominal wall at the level of the liver concerning for disease progression.    3. Right axillary 4.2 (AP) x 3 (TR) x 3 (CC) cm mass/replaced level 1 lymph node which is biopsy proven carcinoma.  Additional level 1 and level 2 masses/abnormal lymph nodes in conglomerate measuring approximately 4.2 (AP) x 7.1 (TR) which at least abut the pectoralis minor muscle.  Right supraclavicular 4 cm and 2.2 cm masses/abnormal lymph nodes and 1 cm right internal mammary lymph node.  4. Multiple enhancing sternal lesions concerning for metastatic disease, largest measuring approximately 1.5 cm.  5. Apparent kyphotic deformity of the mid thoracic vertebrae with retrolisthesis.  Recommend further evaluation with PET-CT and to assess for distant metastasis.    MRI Brain 1/19/23  Impression:  1. Multiple enhancing nodules in the posterior fossa with mild surrounding edema, concerning for metastatic disease.  2. Enhancing bone marrow lesions in the occipital bones, concerning for osseous metastases.    1/26/23 PET   Impression:  1. Diffuse FDG uptake throughout the right breast with overlying skin thickening, right axillary lymphadenopathy and uptake in the right chest wall musculature.  2. Bilateral hypermetabolic cervical lymphadenopathy.  3. Diffuse hypermetabolic osseous metastatic disease.    Pathology:  1. 12/30/2022 Ultrasound-guided Core Needle Biopsy Right  Breast 6 o'clock 9 cm FN - Invasive carcinoma of no special type (ductal), grade 3   ER less than 1%  [place] 0%  HER 2 Negative  Ki67 65%     2. 12/30/2022 Ultrasound-guided Core Needle Biopsy Right Axillary Mass/ Abnormal Lymph Node 10 o'clock 14 cm FN - Invasive carcinoma of no special type (ductal); no lymph node architecture identified    Assessment:   Stage IV Metastatic Triple Negative Inflammatory Breast Cancer - Diagnosed December 2022. Mets to brain and bone.   - Zometa for bone mets - Started March '23  - Radiation Oncology following for brain mets - Completed February '23  - Dr. Shields placed mediport on 1/23/23  - Genetics - Pending  -Chemotherapy associated leukopenia and anemia        Plan:      - she progressed through carboplatin and gemcitabine.  - she will be starting Enhertu on an outpatient basis.  - she is currently admitted with colitis secondary to C diff, being treated.  - continue supportive care.    Cuba Bhatia II, MD  Hematology/Oncology  Can[facility]

## 2023-04-09 NOTE — NURSING
Nurses Note -- 4 Eyes      4/8/2023   7:05 PM      Skin assessed during: Admit      [x] No Pressure Injuries Present    []Prevention Measures Documented      [] Yes- Altered Skin Integrity Present or Discovered   [] LDA Added if Not in Epic (Describe Wound)   [] New Altered Skin Integrity was Present on Admit and Documented in LDA   [] Wound Image Taken    Wound Care Consulted? No    Attending Nurse:  Dona Stark LPN     Second RN/Staff Member:  [email or web address] BLOCK

## 2023-04-09 NOTE — PROGRESS NOTES
[facility] Hospital Medicine Progress Note        Chief Complaint: Inpatient Follow-up for fatigue , diarrhea , vomiting     HPI:   The patient is a 57-year-old female with history of triple negative breast cancer with metastasis to the Brain into bone. She was previously on chemotherapy and was stopped for pancytopenia and started on xrt but this was discontinued on March 27 reportedly because she developed the right pleural effusion and pt has remained pancytopenic. Today the patient has shortness of breath, diarrhea, nausea, dizziness, vomiting times one week. She denies any chest pain or abdominal pain. She does have some baseline cancer related pain. CT scan of the abdomen and pelvis obtained in the emergency department is significant for colitis which may be related to malignancy and persistent right plural effusion with consolidation and collapse of lower right lung. Patient was started on IV fluids, IV antibiotics in the emergency department. Of note pt became hypotensive after receiving IV pain medication in the ED. Midodrine added for BP support.       Interval Hx:   Pt is sitting in the bedside chair with multiple family members in the room. Reports diarrhea had slowed down to two a day. One episode of vomiting earlier today, however appetite is improving. Oncology consult obtained. Pt was receiving radiation treatment prior to coming to the hospital.     Objective/physical exam:  General: In no acute distress, afebrile, O2 via NC 2L/min  Chest: Decreased breath sounds Rt lung mid and lower lobe. Left lung- clear. (+) soft tissue swelling , erythema with skin breakdown Rt breast, Swelling extending to Rt upper chest , shoulder and  entire RUE lymphedema.   Heart: RRR, +S1, S2, no appreciable murmur  Abdomen: Soft, nontender, BS +  MSK: Warm, no lower extremity edema, no clubbing or cyanosis, RUE lymphedema   Neurologic: Alert and oriented x4, Cranial nerve II-XII intact,  Moves all ext.     VITAL SIGNS: 24 HRS MIN & MAX LAST   Temp  Min: 97.7 °F (36.5 °C)  Max: 98.4 °F (36.9 °C) 97.9 °F (36.6 °C)   BP  Min: 97/62  Max: 125/84 100/65   Pulse  Min: 97  Max: 115  105   Resp  Min: 18  Max: 20 18   SpO2  Min: 91 %  Max: 100 % 100 %       Recent Labs   Lab 04/07/23 1958 04/08/23 0617 04/08/23  1237 04/09/23  0746   WBC 1.6* 1.7*  --  3.5*   RBC 2.98* 2.68*  --  2.64*   HGB 8.1* 7.4* 7.9* 7.4*   HCT 27.1* 24.3* 25.7* 23.6*   MCV 90.9 90.7  --  89.4   MCH 27.2 27.6  --  28.0   MCHC 29.9* 30.5*  --  31.4*   RDW 17.0 17.0  --  17.2*   PLT 76* 82*  --  110*   MPV 11.5* 11.2*  --  11.6*       Recent Labs   Lab 04/07/23 1958 04/08/23  0617 04/09/23  0746    135* 135*   K 4.1 3.9 3.9   CO2 28 26 27   BUN 10.8 10.3 8.7*   CREATININE 0.69 0.63 0.60   CALCIUM 8.6 8.2* 7.5*   MG 1.90  --   --    ALBUMIN 2.6* 2.4* 2.2*   ALKPHOS 147 132 138   ALT 96* 88* 89*   AST 62* 56* 62*   BILITOT 0.5 0.5 0.5          Microbiology Results (last 7 days)       Procedure Component Value Units Date/Time    Stool Culture **CANNOT BE ORDERED STAT** [[id number]]  (Normal) Collected: 04/08/23 0734    Order Status: Completed Specimen: Stool Updated: 04/09/23 0858     Stool Culture Negative for Salmonella, Shigella, Campylobacter, Vibrio, Aeromonas, Pleisiomonas,Yersinia, or Shiga Toxin 1 and 2.    Blood Culture #1 **CANNOT BE ORDERED STAT** [[id number]]  (Normal) Collected: 04/07/23 2015    Order Status: Completed Specimen: Blood from Antecubital, Left Updated: 04/08/23 2201     CULTURE, BLOOD ([facility]) No Growth At 24 Hours    Blood Culture #2 **CANNOT BE ORDERED STAT** [[id number]]  (Normal) Collected: 04/07/23 2035    Order Status: Completed Specimen: Blood from Arm, Left Updated: 04/08/23 2201     CULTURE, BLOOD ([facility]) No Growth At 24 Hours    Clostridium Diff Toxin, A & B, EIA [[id number]]  (Abnormal) Collected: 04/08/23 0734    Order Status: Completed Specimen: Stool Updated: 04/08/23 1354     Clostridium Difficile  GDH Antigen Positive     Clostridium Difficile Toxin A/B Negative    Stool Culture **CANNOT BE ORDERED STAT** [[id number]]     Order Status: Sent Specimen: Stool     C Diff Toxin by PCR [[id number]]  (Abnormal) Collected: 04/08/23 0734    Order Status: Completed Specimen: Stool Updated: 04/08/23 1319     Clostridium difficile toxin PCR Detected    Narrative:      The Xpert C.difficile is a qualitative in vitro diagnostic test for rapid detection of the toxin B gene sequences of toxigenic Clostridium difficile from unformed (liquid or soft) stool specimens collected from patients suspected of having C. difficile infection (CDI).    Clostridium Diff Toxin, A & B, EIA [[id number]]     Order Status: Canceled Specimen: Stool              See below for Radiology    Scheduled Med:   enoxaparin  40 mg Subcutaneous Daily    famotidine  20 mg Oral BID    fidaxomicin  200 mg Oral BID    midodrine  5 mg Oral TID WM    morphine  15 mg Oral BID    piperacillin-tazobactam (ZOSYN) IVPB  4.5 g Intravenous Q8H    sodium chloride 0.9%  10 mL Intravenous Q6H    thiamine  100 mg Oral Daily        Continuous Infusions:       PRN Meds:  loperamide, meclizine, melatonin, ondansetron, oxyCODONE, sodium chloride 0.9%, Flushing PICC Protocol **AND** sodium chloride 0.9% **AND** sodium chloride 0.9%       Assessment/Plan:  Clostridium Difficile infection and colitis , initial episode   Right lower lobe pneumonia due to unspecified organism   Rt pleural effusion , likely malignant   Stage IV Metastatic Triple Negative Inflammatory Breast Cancer , Dx 12/2022  Brain and extensive bone metastasis   Pancytopenia , likely treatment induced   Anemia of chronic disease   Elevated LFt's  Nausea and vomiting   Malaise and Debility   Cancer related pain      Plan-    Pt is afebrile. Mild tachycardia is noted  . MAP is favorable. Blood cultures x2 - NG 24h   Continue Zosyn targeting pneumonia   DC Flagyl. Start Fidaxomicin. Fidaxomicin favors over oral   vancomycin given pt is immunosuppressed and high risk of recurrence.   Case is discussed with Dr. Bhatia (Oncology) who suggested Pulmonology consult for evaluation of Rt pleural effusion and collapse.   Consult Radiation Oncology for continuation of radiation treatment while in house  Monitor blood cell counts , signs of active bleeding, monitor H/H  Ok to continue Lovenox for VTE prophylaxis if Plt count 50,000 or higher   Continue supportive treatment for N/V and cancer related pain.       VTE prophylaxis: Lovenox     Patient condition:  Fair with poor long term prognosis.     Anticipated discharge and Disposition:     Home with family       All diagnosis and differential diagnosis have been reviewed; assessment and plan has been documented; I have personally reviewed the labs and test results that are presently available; I have reviewed the patients medication list; I have reviewed the consulting providers response and recommendations. I have reviewed or attempted to review medical records based upon their availability    All of the patient's questions have been  addressed and answered. Patient's is agreeable to the above stated plan. I will continue to monitor closely and make adjustments to medical management as needed.  _____________________________________________________________________    Nutrition Status:    Radiology:  CT Chest Abdomen Pelvis Without Contrast (XPD)  Narrative: EXAMINATION:  CT CHEST ABDOMEN PELVIS WITHOUT CONTRAST(XPD)    CLINICAL HISTORY:  Sepsis;    TECHNIQUE:  Multidetector axial images were obtained from the thoracic inlet through the greater trochanters without the administration of IV contrast.    Dose length product of 336 mGycm. Automated exposure control was utilized to minimize radiation dose.    COMPARISON:  CT chest March 23, 2023    CHEST FINDINGS:    There is no significant difference extensive soft tissue inflammation about the right shoulder, right axilla and the right  anterior chest wall.  There are right axillary enlarged lymph nodes without significant difference in overall size and count.  There is right breast asymmetric density and skin thickening.    There is about similar size of moderate to large right pleural effusion and right lower lung zone collapse consolidation with air bronchograms.  Right upper lung lobe hypoventilatory changes.  Left lung and the left pleural space are unremarkable.  No pericardial effusion.  There nonenlarged mediastinal lymph nodes.  Thoracic aorta is without aneurysmal dilatation.  Cardiac chamber size is within normal limits.  Venous line terminates within distal superior vena cava.    ABDOMINAL FINDINGS:    Within limitation noncontrast technique, no acute findings liver, pancreas or the spleen identified.  Gallbladder lumen contains dense calcified gallstones without thickened wall and there is no apparent dilatation of ducts.  Adrenals are not enlarged.  No renal calculi or acute obstructive uropathy.  Kidneys cortical volume is adequate.    Stomach is decompressed.  No abnormal dilatation of loops of small bowel.  Appendix is not visualized.  There is irregular mural thickening of the cecum and the ascending colon with surrounding inflammatory changes. There are also adjacent multiple up to 1.2 cm lymph nodes.  The remaining colon is nondistended without pericolonic acute standings.  No bowel obstruction.  No free air or free fluid.    PELVIC FINDINGS:    Urinary bladder wall is slightly thickened.  Uterus is not enlarged in size.  Endometrium is not delineated.  No pelvic free fluid.    There are heterogeneous sclerotic changes of the sternum.  Several thoracolumbar vertebrae show variable degree of sclerotic changes consistent with metastatic involvement.  Within thoracic spine the most dominant involvement is of T9 and T10 vertebral bodies.  Within lumbar spine the most apparent involvement is of L3 vertebral body.  There is also  heterogeneous sclerosis of bony pelvis.  Impression: 1.  Unchanged soft tissue inflammations around the right shoulder, right axilla and right chest wall.  Right breast asymmetric density and skin thickening.  Please correlate with mammography findings.    2.  Unchanged right axillary lymphadenopathy.    3.  Persistent right pleural effusion and right lower lung lobe collapse consolidation.  4.  Cecum and ascending colon irregular mural thickening and surrounding inflammatory change may be related to colitis with concern for malignancy.  There are pericecal multiple nonenlarged lymph nodes.. 5.  Extensive skeletal sclerotic lesion consistent with metastasis.    Electronically signed by: Ryley Reno  Date:    04/07/2023  Time:    20:56  X-Ray Chest 1 View  Narrative: EXAMINATION:  XR CHEST 1 VIEW    CLINICAL HISTORY:  fatigue;    TECHNIQUE:  One view    COMPARISON:  March 23, 2023..    FINDINGS:  Cardiopericardial silhouette appearance is similar.  There is right pleural effusion and right mid to lower lung zone atelectasis and/or infiltrates.  These findings are new since the previous exam.  Low volume lungs with perhaps slight congestive process.  There is no pneumothorax.  Left chest implanted tunnel vesta catheter terminates within the superior vena cava.  Impression: New right pleural effusion and right lower lung zone atelectasis and/or infiltrates.    Electronically signed by: Ryley Reno  Date:    04/07/2023  Time:    20:12      Oscar Guevara MD   04/09/2023

## 2023-04-10 NOTE — CARE UPDATE
[id number] Rec consult for hospice services. Spoke with Dr Guevara who asked me to hold off in discussing hospice with pt and pt's family

## 2023-04-10 NOTE — PLAN OF CARE
Problem: Adult Inpatient Plan of Care  Goal: Plan of Care Review  Outcome: Ongoing, Progressing  Goal: Absence of Hospital-Acquired Illness or Injury  Outcome: Ongoing, Progressing     Problem: Adult Inpatient Plan of Care  Goal: Patient-Specific Goal (Individualized)  Outcome: Ongoing, Not Progressing  Goal: Optimal Comfort and Wellbeing  Outcome: Ongoing, Not Progressing   Patient AAOx4 with no s/s of acute distress.  IV c/d/I.  Denies pain.  Right arm swelling with no change.  Safety checks performed.  All needs addressed.

## 2023-04-10 NOTE — CONSULTS
[facility] - 4th Floor Medical Telemetry  Pulmonary Critical Care Note    Patient Name: Esme Marie  MRN: [id number]  Admission Date: 4/7/2023  Hospital Length of Stay: 3 days  Code Status: Full Code  Attending Provider: Oscar Guevara MD  Primary Care Provider: Elizabeth K Lejeune, MD     Subjective:     HPI:   This is a 57 year old female diagnosed with stage IV triple negative breast cancer in December 2022. She started chemotherapy with carboplatin and gemcitabine in February 2023 and underwent radiation of brain mets in February 2023. She presented to the ED on 4/7/12 with diarrhea. A CT scan of the chest, abdomen, and pelvis on 04/07/2023 showed unchanged soft tissue inflammation around the right shoulder, right axilla, and right chest wall with right breast asymmetric density and skin thickening with unchanged right axillary lymphadenopathy, persistent right pleural effusion and right lower lung collapse consolidation.  There was some irregular mural thickening of the cecum and ascending colon maybe related to colitis with concern for malignancy.  There were pericecal multiple nonenlarged lymph nodes and extensive skeletal sclerotic lesions consistent with metastasis.  C Diff was positive. Labs were significant for pancytopenia. Pulmonary is being consulted to review CT findings and for possible thoracentesis.     Hospital Course/Significant events:  4/7/23 - admitted with C Diff    24 Hour Interval History:  She is oxygenating well on 2L NC. Reports shortness of breath has progressively worsened over the past month or so. She does report some pain in the right side with deep breaths.     Past Medical History:   Diagnosis Date    Breast cancer     Hyperlipidemia     Knee pain     Menopausal flushing     Menopause 3/2018    Normocytic normochromic anemia     Vitamin D deficiency        Past Surgical History:   Procedure Laterality Date    PLACEMENT, MEDIPORT N/A 01/23/2023    Procedure:  Placement, [facility];  Surgeon: Silva Shields MD;  Location: [facility];  Service: General;  Laterality: N/A;    TUBAL LIGATION         Social History     Socioeconomic History    Marital status: Single   Occupational History    Occupation: [employer]   Tobacco Use    Smoking status: Never    Smokeless tobacco: Never   Substance and Sexual Activity    Alcohol use: Yes     Comment: occassionally    Drug use: Never    Sexual activity: Yes     Partners: Male     Birth control/protection: None     Social Determinants of Health     Financial Resource Strain: Low Risk     Difficulty of Paying Living Expenses: Not hard at all   Food Insecurity: No Food Insecurity    Worried About Running Out of Food in the Last Year: Never true    Ran Out of Food in the Last Year: Never true   Transportation Needs: No Transportation Needs    Lack of Transportation (Medical): No    Lack of Transportation (Non-Medical): No   Physical Activity: Inactive    Days of Exercise per Week: 0 days    Minutes of Exercise per Session: 0 min   Stress: No Stress Concern Present    Feeling of Stress : Only a little   Social Connections: Moderately Isolated    Frequency of Communication with Friends and Family: More than three times a week    Frequency of Social Gatherings with Friends and Family: More than three times a week    Attends [employer] Services: 1 to 4 times per year    Active Member of Clubs or Organizations: No    Attends Club or Organization Meetings: Never    Marital Status: Never    Housing Stability: Unknown    Unable to Pay for Housing in the Last Year: No    Unstable Housing in the Last Year: No           Current Outpatient Medications   Medication Instructions    acetaminophen (TYLENOL) 650 mg, Oral, Every 8 hours PRN    docusate sodium (COLACE) 100 mg, Oral, 2 times daily    meclizine (ANTIVERT) 25 mg, Oral, 3 times daily PRN    melatonin (MELATIN) 3 mg, Oral, Nightly PRN    morphine (MS CONTIN) 15 mg, Oral, 2 times daily     morphine (MS CONTIN) 15 mg, Oral, Every 8 hours PRN    naproxen sodium (ANAPROX) 220 mg, Oral, Every 12 hours (non-standard times), Takes as directed prn    OLANZapine (ZYPREXA) 5 mg, Oral, Nightly, Take as directed days 1-4 of each chemotherapy cycle.    omega-3 fatty acids/fish oil (FISH OIL-OMEGA-3 FATTY ACIDS) 300-1,000 mg capsule Oral, Daily    ondansetron (ZOFRAN-ODT) 8 mg, Oral, Every 8 hours PRN    oxyCODONE (ROXICODONE) 10 mg, Oral, Every 4 hours PRN    polyethylene glycol (GLYCOLAX) 17 g, Oral, Daily PRN    prochlorperazine (COMPAZINE) 10 mg, Oral, Every 6 hours PRN, 2nd choice, can cause drowsiness.    senna-docusate 8.6-50 mg (PERICOLACE) 8.6-50 mg per tablet 2 tablets, Oral, 2 times daily PRN    traMADoL (ULTRAM) 50 mg, Oral, Every 6 hours    vitamin D (VITAMIN D3) 5,000 Units, Oral, Daily       Current Inpatient Medications   B12-levomefolate calcium-B6  1 tablet Oral Daily    enoxaparin  40 mg Subcutaneous Daily    famotidine  20 mg Oral BID    ferrous sulfate  1 tablet Oral Daily    fidaxomicin  200 mg Oral BID    midodrine  5 mg Oral TID WM    morphine  15 mg Oral BID    piperacillin-tazobactam (ZOSYN) IVPB  4.5 g Intravenous Q8H    potassium, sodium phosphates  2 packet Oral TID    sodium chloride 0.9%  10 mL Intravenous Q6H    thiamine  100 mg Oral Daily       Current Intravenous Infusions        Review of Systems   Constitutional:  Positive for malaise/fatigue.   Respiratory:  Positive for shortness of breath.         Objective:       Intake/Output Summary (Last 24 hours) at 4/10/2023 0848  Last data filed at 4/10/2023 0300  Gross per 24 hour   Intake 480 ml   Output 350 ml   Net 130 ml         Vital Signs (Most Recent):  Temp: 98.1 °F (36.7 °C) (04/10/23 0750)  Pulse: 92 (04/10/23 0750)  Resp: 18 (04/10/23 0750)  BP: 110/73 (04/10/23 0750)  SpO2: 100 % (04/10/23 0750)  Body mass index is 37.75 kg/m².  Weight: 102.9 kg (226 lb 13.7 oz) Vital Signs (24h Range):  Temp:  [97.7 °F (36.5 °C)-98.1  °F (36.7 °C)] 98.1 °F (36.7 °C)  Pulse:  [] 92  Resp:  [18-20] 18  SpO2:  [98 %-100 %] 100 %  BP: ()/(58-74) 110/73     Physical Exam  Vitals reviewed.   Constitutional:       Appearance: Normal appearance.   HENT:      Head: Normocephalic and atraumatic.   Cardiovascular:      Rate and Rhythm: Normal rate.      Heart sounds: Normal heart sounds.   Pulmonary:      Effort: Pulmonary effort is normal.      Comments: Diminished in the right side  Abdominal:      Palpations: Abdomen is soft.   Musculoskeletal:      Cervical back: Neck supple.   Skin:     Comments: Right chest wall breast changes with dressing present   Neurological:      General: No focal deficit present.      Mental Status: She is alert and oriented to person, place, and time.         Lines/Drains/Airways       Peripheral Intravenous Line  Duration                  Midline Catheter Insertion/Assessment  - Single Lumen 04/08/23 0521 Left basilic vein (medial side of arm) other (see comments) 2 days                    Significant Labs:    Lab Results   Component Value Date    WBC 5.6 04/10/2023    HGB 7.1 (L) 04/10/2023    HCT 23.2 (L) 04/10/2023    MCV 90.3 04/10/2023     (L) 04/10/2023         BMP  Lab Results   Component Value Date     (L) 04/10/2023    K 3.6 04/10/2023    CHLORIDE 97 (L) 04/10/2023    CO2 32 (H) 04/10/2023    BUN 7.0 (L) 04/10/2023    CREATININE 0.60 04/10/2023    CALCIUM 8.2 (L) 04/10/2023    AGAP 6.0 04/10/2023    EGFRNONAA 79 (L) 05/14/2021       ABG  No results for input(s): PH, PO2, PCO2, HCO3, BE in the last 168 hours.    Mechanical Ventilation Support:  Oxygen Concentration (%): 100 (04/08/23 1146)    Significant Imaging:  CT Chest Abdomen Pelvis Without Contrast (XPD)  Narrative: EXAMINATION:  CT CHEST ABDOMEN PELVIS WITHOUT CONTRAST(XPD)    CLINICAL HISTORY:  Sepsis;    TECHNIQUE:  Multidetector axial images were obtained from the thoracic inlet through the greater trochanters without the  administration of IV contrast.    Dose length product of 336 mGycm. Automated exposure control was utilized to minimize radiation dose.    COMPARISON:  CT chest March 23, 2023    CHEST FINDINGS:    There is no significant difference extensive soft tissue inflammation about the right shoulder, right axilla and the right anterior chest wall.  There are right axillary enlarged lymph nodes without significant difference in overall size and count.  There is right breast asymmetric density and skin thickening.    There is about similar size of moderate to large right pleural effusion and right lower lung zone collapse consolidation with air bronchograms.  Right upper lung lobe hypoventilatory changes.  Left lung and the left pleural space are unremarkable.  No pericardial effusion.  There nonenlarged mediastinal lymph nodes.  Thoracic aorta is without aneurysmal dilatation.  Cardiac chamber size is within normal limits.  Venous line terminates within distal superior vena cava.    ABDOMINAL FINDINGS:    Within limitation noncontrast technique, no acute findings liver, pancreas or the spleen identified.  Gallbladder lumen contains dense calcified gallstones without thickened wall and there is no apparent dilatation of ducts.  Adrenals are not enlarged.  No renal calculi or acute obstructive uropathy.  Kidneys cortical volume is adequate.    Stomach is decompressed.  No abnormal dilatation of loops of small bowel.  Appendix is not visualized.  There is irregular mural thickening of the cecum and the ascending colon with surrounding inflammatory changes. There are also adjacent multiple up to 1.2 cm lymph nodes.  The remaining colon is nondistended without pericolonic acute standings.  No bowel obstruction.  No free air or free fluid.    PELVIC FINDINGS:    Urinary bladder wall is slightly thickened.  Uterus is not enlarged in size.  Endometrium is not delineated.  No pelvic free fluid.    There are heterogeneous sclerotic  changes of the sternum.  Several thoracolumbar vertebrae show variable degree of sclerotic changes consistent with metastatic involvement.  Within thoracic spine the most dominant involvement is of T9 and T10 vertebral bodies.  Within lumbar spine the most apparent involvement is of L3 vertebral body.  There is also heterogeneous sclerosis of bony pelvis.  Impression: 1.  Unchanged soft tissue inflammations around the right shoulder, right axilla and right chest wall.  Right breast asymmetric density and skin thickening.  Please correlate with mammography findings.    2.  Unchanged right axillary lymphadenopathy.    3.  Persistent right pleural effusion and right lower lung lobe collapse consolidation.  4.  Cecum and ascending colon irregular mural thickening and surrounding inflammatory change may be related to colitis with concern for malignancy.  There are pericecal multiple nonenlarged lymph nodes.. 5.  Extensive skeletal sclerotic lesion consistent with metastasis.    Electronically signed by: Ryley Reno  Date:    04/07/2023  Time:    20:56  X-Ray Chest 1 View  Narrative: EXAMINATION:  XR CHEST 1 VIEW    CLINICAL HISTORY:  fatigue;    TECHNIQUE:  One view    COMPARISON:  March 23, 2023..    FINDINGS:  Cardiopericardial silhouette appearance is similar.  There is right pleural effusion and right mid to lower lung zone atelectasis and/or infiltrates.  These findings are new since the previous exam.  Low volume lungs with perhaps slight congestive process.  There is no pneumothorax.  Left chest implanted tunnel vesta catheter terminates within the superior vena cava.  Impression: New right pleural effusion and right lower lung zone atelectasis and/or infiltrates.    Electronically signed by: Ryley Reno  Date:    04/07/2023  Time:    20:12            Assessment/Plan:     Assessment  Stage IV triple negative breast cancer with mets to brain and bone   Right sided pleural effusion and atelectasis   C Diff  colitis  Pancytopenia       Plan  Will review CT imaging with MD to determine need for thoracentesis  Continue Zosyn (day 3) for possible post obstructive pneumonia  Continue Fidaxomicin for Cdiff       ASHLEY Troy  Pulmonary Critical Care Medicine  [facility] - 4th Floor Medical Telemetry

## 2023-04-10 NOTE — PROGRESS NOTES
[facility] Hospital Medicine Progress Note        Chief Complaint: Inpatient Follow-up for SOB, fatigue , diarrhea , vomiting     HPI:   The patient is a 57-year-old female with history of triple negative breast cancer with metastasis to the Brain into bone. She was previously on chemotherapy and was stopped for pancytopenia and started on xrt but this was discontinued on March 27 reportedly because she developed the right pleural effusion and pt has remained pancytopenic. Today the patient has shortness of breath, diarrhea, nausea, dizziness, vomiting times one week. She denies any chest pain or abdominal pain. She does have some baseline cancer related pain. CT scan of the abdomen and pelvis obtained in the emergency department is significant for colitis which may be related to malignancy and persistent right plural effusion with consolidation and collapse of lower right lung. Patient was started on IV fluids, IV antibiotics in the emergency department. Of note pt became hypotensive after receiving IV pain medication in the ED. Midodrine added for BP support  Interval Hx:   Afebrile. O2 via NC 2L/min. Pt reports stool is not loose any more. No further vomiting reported. Pulmonology consult obtained.  WBC 5.6, Hgb 7.1, Plt 116    Objective/physical exam:    General: In no acute distress, afebrile, O2 via NC 2L/min  Chest: Decreased breath sounds Rt lung mid and lower lobe. Left lung- clear. (+) soft tissue swelling , erythema with skin breakdown Rt breast, Swelling extending to Rt upper chest , shoulder and  entire RUE lymphedema.   Heart: RRR, +S1, S2, no appreciable murmur  Abdomen: Soft, nontender, BS +  MSK: Warm, no lower extremity edema, no clubbing or cyanosis, RUE lymphedema   Neurologic: Alert and oriented x4, Cranial nerve II-XII intact, Moves all ext.         VITAL SIGNS: 24 HRS MIN & MAX LAST   Temp  Min: 97.6 °F (36.4 °C)  Max: 98.1 °F (36.7 °C) 97.6 °F (36.4 °C)   BP   Min: 99/58  Max: 110/73 109/71   Pulse  Min: 79  Max: 102  79   Resp  Min: 18  Max: 20 18   SpO2  Min: 98 %  Max: 100 % 100 %       Recent Labs   Lab 04/08/23 0617 04/08/23 1237 04/09/23 0746 04/10/23  0407   WBC 1.7*  --  3.5* 5.6   RBC 2.68*  --  2.64* 2.57*   HGB 7.4* 7.9* 7.4* 7.1*   HCT 24.3* 25.7* 23.6* 23.2*   MCV 90.7  --  89.4 90.3   MCH 27.6  --  28.0 27.6   MCHC 30.5*  --  31.4* 30.6*   RDW 17.0  --  17.2* 17.2*   PLT 82*  --  110* 116*   MPV 11.2*  --  11.6* 10.9*       Recent Labs   Lab 04/07/23 1958 04/07/23 2012 04/08/23 0617 04/09/23 0746 04/10/23  0407     --  135* 135* 135*   K 4.1  --  3.9 3.9 3.6   CO2 28  --  26 27 32*   BUN 10.8  --  10.3 8.7* 7.0*   CREATININE 0.69  --  0.63 0.60 0.60   CALCIUM 8.6  --  8.2* 7.5* 8.2*   PH  --  7.414  --   --   --    MG 1.90  --   --   --  2.00   ALBUMIN 2.6*  --  2.4* 2.2*  --    ALKPHOS 147  --  132 138  --    ALT 96*  --  88* 89*  --    AST 62*  --  56* 62*  --    BILITOT 0.5  --  0.5 0.5  --           Microbiology Results (last 7 days)       Procedure Component Value Units Date/Time    Stool Culture **CANNOT BE ORDERED STAT** [[id number]]  (Normal) Collected: 04/08/23 0734    Order Status: Completed Specimen: Stool Updated: 04/10/23 0978     Stool Culture Negative for Salmonella, Shigella, Campylobacter, Vibrio, Aeromonas, Pleisiomonas,Yersinia, or Shiga Toxin 1 and 2.    Blood Culture #1 **CANNOT BE ORDERED STAT** [[id number]]  (Normal) Collected: 04/07/23 2015    Order Status: Completed Specimen: Blood from Antecubital, Left Updated: 04/09/23 2200     CULTURE, BLOOD ([facility]) No Growth At 48 Hours    Blood Culture #2 **CANNOT BE ORDERED STAT** [[id number]]  (Normal) Collected: 04/07/23 2035    Order Status: Completed Specimen: Blood from Arm, Left Updated: 04/09/23 2200     CULTURE, BLOOD ([facility]) No Growth At 48 Hours    Clostridium Diff Toxin, A & B, EIA [[id number]]  (Abnormal) Collected: 04/08/23 0734    Order Status: Completed Specimen: Stool  Updated: 04/08/23 1354     Clostridium Difficile GDH Antigen Positive     Clostridium Difficile Toxin A/B Negative    Stool Culture **CANNOT BE ORDERED STAT** [[id number]]     Order Status: Sent Specimen: Stool     C Diff Toxin by PCR [[id number]]  (Abnormal) Collected: 04/08/23 0734    Order Status: Completed Specimen: Stool Updated: 04/08/23 1319     Clostridium difficile toxin PCR Detected    Narrative:      The Xpert C.difficile is a qualitative in vitro diagnostic test for rapid detection of the toxin B gene sequences of toxigenic Clostridium difficile from unformed (liquid or soft) stool specimens collected from patients suspected of having C. difficile infection (CDI).    Clostridium Diff Toxin, A & B, EIA [[id number]]     Order Status: Canceled Specimen: Stool              See below for Radiology    Scheduled Med:   B12-levomefolate calcium-B6  1 tablet Oral Daily    enoxaparin  40 mg Subcutaneous Daily    famotidine  20 mg Oral BID    ferrous sulfate  1 tablet Oral Daily    fidaxomicin  200 mg Oral BID    midodrine  5 mg Oral TID WM    morphine  15 mg Oral BID    piperacillin-tazobactam (ZOSYN) IVPB  4.5 g Intravenous Q8H    potassium, sodium phosphates  2 packet Oral TID    sodium chloride 0.9%  10 mL Intravenous Q6H    thiamine  100 mg Oral Daily        Continuous Infusions:       PRN Meds:  loperamide, meclizine, melatonin, ondansetron, oxyCODONE, sodium chloride 0.9%, Flushing PICC Protocol **AND** sodium chloride 0.9% **AND** sodium chloride 0.9%       Assessment/Plan:  Clostridium Difficile infection and colitis , initial episode   Right lower lobe pneumonia due to unspecified organism   Rt pleural effusion and Rt lower lobe collapse.    Stage IV Metastatic Triple Negative Inflammatory Breast Cancer , Dx 12/2022  Brain and extensive bone metastasis   Pancytopenia , likely treatment induced   Anemia of chronic disease   Elevated LFt's  Nausea and vomiting   Malaise and Debility   Cancer related pain         Plan-  WBC recovered . Hgb is stable at 7.1 . Plt count is 116. Pt remains afebrile. Oxygen demand is stable at 2L/min NC    Pulmonology consult obtained. Bedside ultrasound showed a small pocket of fluid not enough for thoracentesis, however they suspect pt  may have endobronchial lesion causing the right lung collapse versus extrinsic compression and bronchoscopy suggested. Pulmonology would wait for Oncology comment prior to proceed with bronchoscopy     Continue Zosyn targeting pneumonia   DC Flagyl. Start Fidaxomicin. Fidaxomicin favors over oral  vancomycin given pt is immunosuppressed and high risk of recurrence.     Consult Radiation Oncology for continuation of radiation treatment while in house    Monitor blood cell counts , signs of active bleeding, monitor H/H  Ok to continue Lovenox for VTE prophylaxis if Plt count 50,000 or higher   Continue supportive treatment for N/V and cancer related pain.     VTE prophylaxis: Lovenox     Patient condition:  Fair     Anticipated discharge and Disposition:     Home with family     All diagnosis and differential diagnosis have been reviewed; assessment and plan has been documented; I have personally reviewed the labs and test results that are presently available; I have reviewed the patients medication list; I have reviewed the consulting providers response and recommendations. I have reviewed or attempted to review medical records based upon their availability    All of the patient's questions have been  addressed and answered. Patient's is agreeable to the above stated plan. I will continue to monitor closely and make adjustments to medical management as needed.  _____________________________________________________________________    Nutrition Status:    Radiology:  CT Chest Abdomen Pelvis Without Contrast (XPD)  Narrative: EXAMINATION:  CT CHEST ABDOMEN PELVIS WITHOUT CONTRAST(XPD)    CLINICAL HISTORY:  Sepsis;    TECHNIQUE:  Multidetector axial images were  obtained from the thoracic inlet through the greater trochanters without the administration of IV contrast.    Dose length product of 336 mGycm. Automated exposure control was utilized to minimize radiation dose.    COMPARISON:  CT chest March 23, 2023    CHEST FINDINGS:    There is no significant difference extensive soft tissue inflammation about the right shoulder, right axilla and the right anterior chest wall.  There are right axillary enlarged lymph nodes without significant difference in overall size and count.  There is right breast asymmetric density and skin thickening.    There is about similar size of moderate to large right pleural effusion and right lower lung zone collapse consolidation with air bronchograms.  Right upper lung lobe hypoventilatory changes.  Left lung and the left pleural space are unremarkable.  No pericardial effusion.  There nonenlarged mediastinal lymph nodes.  Thoracic aorta is without aneurysmal dilatation.  Cardiac chamber size is within normal limits.  Venous line terminates within distal superior vena cava.    ABDOMINAL FINDINGS:    Within limitation noncontrast technique, no acute findings liver, pancreas or the spleen identified.  Gallbladder lumen contains dense calcified gallstones without thickened wall and there is no apparent dilatation of ducts.  Adrenals are not enlarged.  No renal calculi or acute obstructive uropathy.  Kidneys cortical volume is adequate.    Stomach is decompressed.  No abnormal dilatation of loops of small bowel.  Appendix is not visualized.  There is irregular mural thickening of the cecum and the ascending colon with surrounding inflammatory changes. There are also adjacent multiple up to 1.2 cm lymph nodes.  The remaining colon is nondistended without pericolonic acute standings.  No bowel obstruction.  No free air or free fluid.    PELVIC FINDINGS:    Urinary bladder wall is slightly thickened.  Uterus is not enlarged in size.  Endometrium is  not delineated.  No pelvic free fluid.    There are heterogeneous sclerotic changes of the sternum.  Several thoracolumbar vertebrae show variable degree of sclerotic changes consistent with metastatic involvement.  Within thoracic spine the most dominant involvement is of T9 and T10 vertebral bodies.  Within lumbar spine the most apparent involvement is of L3 vertebral body.  There is also heterogeneous sclerosis of bony pelvis.  Impression: 1.  Unchanged soft tissue inflammations around the right shoulder, right axilla and right chest wall.  Right breast asymmetric density and skin thickening.  Please correlate with mammography findings.    2.  Unchanged right axillary lymphadenopathy.    3.  Persistent right pleural effusion and right lower lung lobe collapse consolidation.  4.  Cecum and ascending colon irregular mural thickening and surrounding inflammatory change may be related to colitis with concern for malignancy.  There are pericecal multiple nonenlarged lymph nodes.. 5.  Extensive skeletal sclerotic lesion consistent with metastasis.    Electronically signed by: Ryley Reno  Date:    04/07/2023  Time:    20:56  X-Ray Chest 1 View  Narrative: EXAMINATION:  XR CHEST 1 VIEW    CLINICAL HISTORY:  fatigue;    TECHNIQUE:  One view    COMPARISON:  March 23, 2023..    FINDINGS:  Cardiopericardial silhouette appearance is similar.  There is right pleural effusion and right mid to lower lung zone atelectasis and/or infiltrates.  These findings are new since the previous exam.  Low volume lungs with perhaps slight congestive process.  There is no pneumothorax.  Left chest implanted tunnel vesta catheter terminates within the superior vena cava.  Impression: New right pleural effusion and right lower lung zone atelectasis and/or infiltrates.    Electronically signed by: Ryley Reno  Date:    04/07/2023  Time:    20:12      Oscar Guevara MD   04/10/2023

## 2023-04-11 NOTE — PROGRESS NOTES
Dr. Solomon performed bedside ultrasound yesterday, revealing a small pleural effusion that he felt was not in clinical need of a therapeutic thoracentesis.  Review of CT chest images failed to show any obvious postobstructive changes, and the atelectasis appears most consistent with compensatory compressive atelectasis due to the pleural fluid.  I discussed case with Dr. Bhatia this morning, who did not feel that she required a diagnostic thoracentesis or diagnostic bronchoscopy from an oncology aspect at this point.  If patient should show increase in pleural effusion or if there is any need for further diagnostic procedures, please call and we will proceed forward at that point.

## 2023-04-11 NOTE — PT/OT/SLP EVAL
"Physical Therapy Evaluation    Patient Name:  Esme Marie   MRN:  [id number]    Recommendations:     Discharge Recommendations: home health PT   Discharge Equipment Recommendations: none   Barriers to discharge:  medical status    Assessment:     Esme Marie is a 57 y.o. female admitted with a medical diagnosis of pleural effusion. Patient with stage IV breast CA with mets to brain & bone. Patient lives with son in [facility]. At baseline, she requires assistance with ADL's and ambulates with RW.  She presents with the following impairments/functional limitations: weakness, impaired endurance, impaired self care skills, impaired functional mobility, gait instability, impaired balance, decreased upper extremity function, decreased lower extremity function, decreased safety awareness, pain. Patient up in chair at beginning of session. She required MOD A for sit<>stand. Ambulated 5 ft with RW & MIN A. Limited by fatigue & nausea. She appears to have very good family support. She will need HH PT at discharge. Progress patient as tolerated.     Rehab Prognosis: Fair; patient would benefit from acute skilled PT services to address these deficits and reach maximum level of function.    Recent Surgery: * No surgery found *      Plan:     During this hospitalization, patient to be seen 5 x/week to address the identified rehab impairments via gait training, therapeutic activities, therapeutic exercises, neuromuscular re-education and progress toward the following goals:    Plan of Care Expires:  05/11/23    Subjective     Chief Complaint: pain & nausea  Patient/Family Comments/goals: none  Pain/Comfort:  Pain Rating 1: 5/10  Location 1:  ("whole body")  Pain Addressed 1: Distraction, Reposition  Pain Rating Post-Intervention 1: 5/10    Patients cultural, spiritual, Confucianist conflicts given the current situation: no    Living Environment:  Lives with son in [facility].  Prior to admission, patient required assistance with ADL's " and mobility.  Equipment used at home: walker, rolling, bedside commode, shower chair.  DME owned (not currently used): none.  Upon discharge, patient will have assistance from family.    Objective:     Communicated with nurse prior to session.  Patient found up in chair with telemetry, peripheral IV  upon PT entry to room.    General Precautions: Standard, fall, contact  Orthopedic Precautions:N/A   Braces: N/A  Respiratory Status: Nasal cannula, flow 2 L/min. Satting in 90's the entire session    Exams:  Cognitive Exam:  Patient is oriented to Person, Place, Time, and Situation  Sensation:    -       Intact  RLE ROM: WFL  RLE Strength: -4/5 grossly  LLE ROM: WFL  LLE Strength: -4/5 grossly    Functional Mobility:  Transfers:     Sit to Stand:  moderate assistance with rolling walker  Gait: 5 ft with RW & MIN A. Limited by fatigue & nausea  Balance: fair/poor      AM-PAC 6 CLICK MOBILITY  Total Score:12     Patient left up in chair with all lines intact, call button in reach, and family present.    GOALS:   Multidisciplinary Problems       Physical Therapy Goals          Problem: Physical Therapy    Goal Priority Disciplines Outcome Goal Variances Interventions   Physical Therapy Goal     PT, PT/OT Ongoing, Progressing     Description: Goals to be met by: 23     Patient will increase functional independence with mobility by performin. Supine to sit with Stand-by Assistance  2. Sit to supine with Stand-by Assistance  3. Sit to stand transfer with Stand-by Assistance  4. Gait  x 200 feet with Stand-by Assistance using Rolling Walker.                          History:     Past Medical History:   Diagnosis Date    Breast cancer     Hyperlipidemia     Knee pain     Menopausal flushing     Menopause 3/2018    Normocytic normochromic anemia     Vitamin D deficiency        Past Surgical History:   Procedure Laterality Date    PLACEMENT, MEDIPORT N/A 2023    Procedure: Placement, Mediport;  Surgeon:  Silva Shields MD;  Location: [facility];  Service: General;  Laterality: N/A;    TUBAL LIGATION         Time Tracking:     PT Received On: 04/11/23  PT Start Time: 1135     PT Stop Time: 1159  PT Total Time (min): 24 min     Billable Minutes: Evaluation 24 minutes      04/11/2023

## 2023-04-11 NOTE — CONSULTS
Infectious Diseases Consultation       Inpatient consult to Infectious Diseases  Consult performed by: Marialuisa Soliz MD  Consult ordered by: Oscar Guevara MD      HPI:   57-year-old female with past medical history of triple negative breast cancer with metastasis to the brain and bone, was on chemotherapy which was put on hold due to pancytopenia as well as was on radiation therapy discontinued 03/27/2023, apparently at time of developing pleural effusion with persistent pancytopenia, is admitted to [facility] on 04/07/2023 with shortness of breath, diarrhea, nausea, vomiting, and dizziness.  She was evaluated and noted to have no fevers but pancytopenic with WBC 1.6, platelets 76 and anemic.  Elevated transaminases with AST 62 and ALT 96, low albumin.  Urinalysis was not impressive.  Chest x-ray showed new right pleural effusion and right lower lung zone atelectasis and/or infiltrate.  CT chest, abdomen and pelvis without contrast showed unchanged soft tissue inflammation around the right shoulder, right axilla and right chest wall, right breast asymmetric density and skin thickening to be correlated with mammography findings, unchanged right axillary lymphadenopathy, persistent right pleural effusion and right lower lung lobe collapse consolidation, cecum and ascending colon irregular mural thickening and surrounding inflammatory changes which may be related to colitis with concern for malignancy, pericecal multiple nonenlarged lymph nodes, extensive skeletal sclerotic lesion consistent with metastasis.  Ultrasound of the chest with minimal right pleural effusion.  She is on antibiotic coverage with Zosyn and Dificid.    Past Medical and Surgical History  Allergies :   Patient has no known allergies.    Medical :   She has a past medical history of Breast cancer, Hyperlipidemia, Knee pain, Menopausal flushing, Menopause (3/2018), Normocytic normochromic anemia, and Vitamin D  deficiency.    Surgical :   She has a past surgical history that includes Tubal ligation and placement, mediport (N/A, 01/23/2023).     Family History  Her family history includes Arthritis in her mother; Diabetes in her mother; Hypertension in her mother; Lung cancer (age of onset: 62) in her father.    Social History  She reports that she has never smoked. She has never used smokeless tobacco. She reports current alcohol use. She reports that she does not use drugs.     Review of Systems   Constitutional:  Positive for malaise/fatigue.   HENT: Negative.     Respiratory:  Positive for shortness of breath.    Gastrointestinal:  Positive for diarrhea and nausea.   Genitourinary: Negative.    Musculoskeletal: Negative.    Neurological:  Positive for weakness.   Endo/Heme/Allergies: Negative.    Psychiatric/Behavioral: Negative.     All other Systems review done and negative.    Objective   Physical Exam  Vitals reviewed.   Constitutional:       General: She is not in acute distress.     Comments: Weak in bed.  Sister at the bedside   HENT:      Head: Normocephalic and atraumatic.   Eyes:      Pupils: Pupils are equal, round, and reactive to light.   Cardiovascular:      Rate and Rhythm: Normal rate and regular rhythm.   Pulmonary:      Effort: Pulmonary effort is normal.      Breath sounds: Normal breath sounds.   Abdominal:      General: Bowel sounds are normal. There is no distension.      Palpations: Abdomen is soft.      Tenderness: There is no abdominal tenderness.   Genitourinary:     Comments: No suprapubic tenderness  Musculoskeletal:      Cervical back: Neck supple.      Right lower leg: No edema.      Left lower leg: Edema present.   Skin:     Findings: No rash.      Comments: Right breast cancer wound with induration   Neurological:      Mental Status: She is alert and oriented to person, place, and time.   Psychiatric:      Comments: Calm and cooperative     VITAL SIGNS: 24 HR MIN & MAX LAST    Temp  Min:  "97.6 °F (36.4 °C)  Max: 98.3 °F (36.8 °C)  98.3 °F (36.8 °C)        BP  Min: 99/58  Max: 112/77  112/77     Pulse  Min: 79  Max: 100  85     Resp  Min: 18  Max: 20  18    SpO2  Min: 99 %  Max: 100 %  100 %      HT: 5' 5" (165.1 cm)  WT: 102.9 kg (226 lb 13.7 oz)  BMI: 37.8     Recent Results (from the past 24 hour(s))   Basic Metabolic Panel    Collection Time: 04/10/23  4:07 AM   Result Value Ref Range    Sodium Level 135 (L) 136 - 145 mmol/L    Potassium Level 3.6 3.5 - 5.1 mmol/L    Chloride 97 (L) 98 - 107 mmol/L    Carbon Dioxide 32 (H) 22 - 29 mmol/L    Glucose Level 90 74 - 100 mg/dL    Blood Urea Nitrogen 7.0 (L) 9.8 - 20.1 mg/dL    Creatinine 0.60 0.55 - 1.02 mg/dL    BUN/Creatinine Ratio 12     Calcium Level Total 8.2 (L) 8.4 - 10.2 mg/dL    Anion Gap 6.0 mEq/L    eGFR >60 mls/min/1.73/m2   Magnesium    Collection Time: 04/10/23  4:07 AM   Result Value Ref Range    Magnesium Level 2.00 1.60 - 2.60 mg/dL   Phosphorus    Collection Time: 04/10/23  4:07 AM   Result Value Ref Range    Phosphorus Level 2.0 (L) 2.3 - 4.7 mg/dL   Folate    Collection Time: 04/10/23  4:07 AM   Result Value Ref Range    Folate Level 7.4 7.0 - 31.4 ng/mL   CBC with Differential    Collection Time: 04/10/23  4:07 AM   Result Value Ref Range    WBC 5.6 4.5 - 11.5 x10(3)/mcL    RBC 2.57 (L) 4.20 - 5.40 x10(6)/mcL    Hgb 7.1 (L) 12.0 - 16.0 g/dL    Hct 23.2 (L) 37.0 - 47.0 %    MCV 90.3 80.0 - 94.0 fL    MCH 27.6 27.0 - 31.0 pg    MCHC 30.6 (L) 33.0 - 36.0 g/dL    RDW 17.2 (H) 11.5 - 17.0 %    Platelet 116 (L) 130 - 400 x10(3)/mcL    MPV 10.9 (H) 7.4 - 10.4 fL    NRBC% 1.8 %   Manual Differential    Collection Time: 04/10/23  4:07 AM   Result Value Ref Range    Neut Man 74 %    Lymph Man 8 %    Monocyte Man 6 %    Basophil Man 2 %    Curtis Man 6 %    Myelo Man 2 %    Promyelo Man 2 %    Instr WBC 5.7 x10(3)/mcL    Abs Mono 0.342 0.1 - 1.3 x10(3)/mcL    Abs Baso 0.114 0 - 0.2 x10(3)/mcL    Abs Lymp 0.456 (L) 0.6 - 4.6 x10(3)/mcL    Abs " Neut 4.788 2.1 - 9.2 x10(3)/mcL    NRBC Man 2 %    Polychrom 1+ (A) (none)    RBC Morph Abnormal (A) Normal    Anisocyte 1+ (A) (none)    Poik 1+ (A) (none)    Microcyte 1+ (A) (none)    Hypochrom 1+ (A) (none)    Target Cell 1+ (A) (none)    Stomatocytes 1+ (A) (none)    Platelet Est Decreased (A) Normal, Adequate       Imaging  Imaging Results              CT Chest Abdomen Pelvis Without Contrast (XPD) (Final result)  Result time 04/07/23 20:56:07      Final result by Ryley Reno MD (04/07/23 20:56:07)                   Impression:      1.  Unchanged soft tissue inflammations around the right shoulder, right axilla and right chest wall.  Right breast asymmetric density and skin thickening.  Please correlate with mammography findings.    2.  Unchanged right axillary lymphadenopathy.    3.  Persistent right pleural effusion and right lower lung lobe collapse consolidation.  4.  Cecum and ascending colon irregular mural thickening and surrounding inflammatory change may be related to colitis with concern for malignancy.  There are pericecal multiple nonenlarged lymph nodes.. 5.  Extensive skeletal sclerotic lesion consistent with metastasis.      Electronically signed by: Ryley Reno  Date:    04/07/2023  Time:    20:56               Narrative:    EXAMINATION:  CT CHEST ABDOMEN PELVIS WITHOUT CONTRAST(XPD)    CLINICAL HISTORY:  Sepsis;    TECHNIQUE:  Multidetector axial images were obtained from the thoracic inlet through the greater trochanters without the administration of IV contrast.    Dose length product of 336 mGycm. Automated exposure control was utilized to minimize radiation dose.    COMPARISON:  CT chest March 23, 2023    CHEST FINDINGS:    There is no significant difference extensive soft tissue inflammation about the right shoulder, right axilla and the right anterior chest wall.  There are right axillary enlarged lymph nodes without significant difference in overall size and count.  There is right  breast asymmetric density and skin thickening.    There is about similar size of moderate to large right pleural effusion and right lower lung zone collapse consolidation with air bronchograms.  Right upper lung lobe hypoventilatory changes.  Left lung and the left pleural space are unremarkable.  No pericardial effusion.  There nonenlarged mediastinal lymph nodes.  Thoracic aorta is without aneurysmal dilatation.  Cardiac chamber size is within normal limits.  Venous line terminates within distal superior vena cava.    ABDOMINAL FINDINGS:    Within limitation noncontrast technique, no acute findings liver, pancreas or the spleen identified.  Gallbladder lumen contains dense calcified gallstones without thickened wall and there is no apparent dilatation of ducts.  Adrenals are not enlarged.  No renal calculi or acute obstructive uropathy.  Kidneys cortical volume is adequate.    Stomach is decompressed.  No abnormal dilatation of loops of small bowel.  Appendix is not visualized.  There is irregular mural thickening of the cecum and the ascending colon with surrounding inflammatory changes. There are also adjacent multiple up to 1.2 cm lymph nodes.  The remaining colon is nondistended without pericolonic acute standings.  No bowel obstruction.  No free air or free fluid.    PELVIC FINDINGS:    Urinary bladder wall is slightly thickened.  Uterus is not enlarged in size.  Endometrium is not delineated.  No pelvic free fluid.    There are heterogeneous sclerotic changes of the sternum.  Several thoracolumbar vertebrae show variable degree of sclerotic changes consistent with metastatic involvement.  Within thoracic spine the most dominant involvement is of T9 and T10 vertebral bodies.  Within lumbar spine the most apparent involvement is of L3 vertebral body.  There is also heterogeneous sclerosis of bony pelvis.                                       X-Ray Chest 1 View (Final result)  Result time 04/07/23 20:12:58       Final result by Ryley Reno MD (04/07/23 20:12:58)                   Impression:      New right pleural effusion and right lower lung zone atelectasis and/or infiltrates.      Electronically signed by: Ryley Reno  Date:    04/07/2023  Time:    20:12               Narrative:    EXAMINATION:  XR CHEST 1 VIEW    CLINICAL HISTORY:  fatigue;    TECHNIQUE:  One view    COMPARISON:  March 23, 2023..    FINDINGS:  Cardiopericardial silhouette appearance is similar.  There is right pleural effusion and right mid to lower lung zone atelectasis and/or infiltrates.  These findings are new since the previous exam.  Low volume lungs with perhaps slight congestive process.  There is no pneumothorax.  Left chest implanted tunnel vesta catheter terminates within the superior vena cava.                        Wet Read by Flako Gray DO (04/07/23 19:53:39, [facility] - Emergency Dept, Emergency Medicine) Flagged as Abnormal    A chest X-Ray was ordered. My reading of this film is LARGE right plural effusion. (Pending review by Radiologist.)                                         Impression  1. C difficile colitis  2.  Right pneumonitis with pleural effusion  3.  Metastatic breast cancer stage IV  4.  Pancytopenia  5.  Transaminitis    Recommendations  I agree with the management of this patient.  Immunocompromised by virtue of advanced breast cancer, was on chemotherapy complicated by development of pancytopenia as well as had received radiation therapy.  She is without much of systemic signs of infection by way of fevers but has abnormal chest imaging studies concerning for possibility of pneumonitis with associated pleural effusion.  She does have C difficile infection and hence we need to be very judicious with further antibiotic exposure.  We will continue with Dificid and plan a 10 day course and I do recommend a short course of broad antibiotic coverage with Zosyn which can be discontinued in  about a couple of days especially since with improved neutropenia.  This might help with pancytopenia as well as transaminitis, to which Zosyn could adversely affect.  We will follow with labs in a.m.   Guarded long-term prognosis.  Case is discussed at length with patient and sister at the bedside, questions solicited and answered.  I have also discussed the case with nursing staff.  I would like to thank the team very much for the opportunity to assist in the care of this patient.

## 2023-04-11 NOTE — PROGRESS NOTES
[facility] Hospital Medicine Progress Note        Chief Complaint: Inpatient Follow-up for SOB, fatigue , diarrhea , vomiting     HPI:   The patient is a 57-year-old female with history of triple negative breast cancer with metastasis to the Brain into bone. She was previously on chemotherapy and was stopped for pancytopenia and started on xrt but this was discontinued on March 27 reportedly because she developed the right pleural effusion and pt has remained pancytopenic. Today the patient has shortness of breath, diarrhea, nausea, dizziness, vomiting times one week. She denies any chest pain or abdominal pain. She does have some baseline cancer related pain. CT scan of the abdomen and pelvis obtained in the emergency department is significant for colitis which may be related to malignancy and persistent right plural effusion with consolidation and collapse of lower right lung. Patient was started on IV fluids, IV antibiotics in the emergency department. Of note pt became hypotensive after receiving IV pain medication in the ED. Midodrine added for BP support.    Interval Hx:   Afebrile. Pt is awake, sitting in the bedside chair, appears withdrawn and not answering questions. Multiple family members in the room answering for her. Started radiation treatment to Rt breast since yesterday 4/11/23. WBC 9.2, Hgb 7.8, Plt 148    Objective/physical exam:  General: In no acute distress, afebrile, O2 via NC 2L/min  Chest: Decreased breath sounds Rt lung mid and lower lobe. Left lung- clear. (+) soft tissue swelling , erythema with skin breakdown Rt breast, Swelling extending to Rt upper chest , shoulder and  entire RUE lymphedema.   Heart: RRR, +S1, S2, no appreciable murmur  Abdomen: Soft, nontender, BS +  MSK: Warm, no lower extremity edema, no clubbing or cyanosis, RUE lymphedema   Neurologic: Alert and oriented x4, Cranial nerve II-XII intact, Moves all ext.         VITAL SIGNS: 24 HRS MIN  & MAX LAST   Temp  Min: 97.5 °F (36.4 °C)  Max: 98.3 °F (36.8 °C) 98.3 °F (36.8 °C)   BP  Min: 97/63  Max: 112/73 97/63   Pulse  Min: 81  Max: 103  81   Resp  Min: 18  Max: 18 18   SpO2  Min: 100 %  Max: 100 % 100 %       Recent Labs   Lab 04/09/23  0746 04/10/23  0407 04/11/23  0444   WBC 3.5* 5.6 9.2   RBC 2.64* 2.57* 2.89*   HGB 7.4* 7.1* 7.8*   HCT 23.6* 23.2* 26.4*   MCV 89.4 90.3 91.3   MCH 28.0 27.6 27.0   MCHC 31.4* 30.6* 29.5*   RDW 17.2* 17.2* 17.2*   * 116* 148   MPV 11.6* 10.9* 11.3*       Recent Labs   Lab 04/07/23  1958 04/07/23  2012 04/08/23  0617 04/09/23  0746 04/10/23  0407     --  135* 135* 135*   K 4.1  --  3.9 3.9 3.6   CO2 28  --  26 27 32*   BUN 10.8  --  10.3 8.7* 7.0*   CREATININE 0.69  --  0.63 0.60 0.60   CALCIUM 8.6  --  8.2* 7.5* 8.2*   PH  --  7.414  --   --   --    MG 1.90  --   --   --  2.00   ALBUMIN 2.6*  --  2.4* 2.2*  --    ALKPHOS 147  --  132 138  --    ALT 96*  --  88* 89*  --    AST 62*  --  56* 62*  --    BILITOT 0.5  --  0.5 0.5  --           Microbiology Results (last 7 days)       Procedure Component Value Units Date/Time    Blood Culture #1 **CANNOT BE ORDERED STAT** [[id number]]  (Normal) Collected: 04/07/23 2015    Order Status: Completed Specimen: Blood from Antecubital, Left Updated: 04/10/23 2201     CULTURE, BLOOD ([facility]) No Growth At 72 Hours    Blood Culture #2 **CANNOT BE ORDERED STAT** [[id number]]  (Normal) Collected: 04/07/23 2035    Order Status: Completed Specimen: Blood from Arm, Left Updated: 04/10/23 2201     CULTURE, BLOOD ([facility]) No Growth At 72 Hours    Stool Culture **CANNOT BE ORDERED STAT** [[id number]]  (Normal) Collected: 04/08/23 0734    Order Status: Completed Specimen: Stool Updated: 04/10/23 0951     Stool Culture Negative for Salmonella, Shigella, Campylobacter, Vibrio, Aeromonas, Pleisiomonas,Yersinia, or Shiga Toxin 1 and 2.    Clostridium Diff Toxin, A & B, EIA [[id number]]  (Abnormal) Collected: 04/08/23 0734    Order Status:  Completed Specimen: Stool Updated: 04/08/23 1354     Clostridium Difficile GDH Antigen Positive     Clostridium Difficile Toxin A/B Negative    Stool Culture **CANNOT BE ORDERED STAT** [[id number]]     Order Status: Sent Specimen: Stool     C Diff Toxin by PCR [[id number]]  (Abnormal) Collected: 04/08/23 0734    Order Status: Completed Specimen: Stool Updated: 04/08/23 1319     Clostridium difficile toxin PCR Detected    Narrative:      The Xpert C.difficile is a qualitative in vitro diagnostic test for rapid detection of the toxin B gene sequences of toxigenic Clostridium difficile from unformed (liquid or soft) stool specimens collected from patients suspected of having C. difficile infection (CDI).    Clostridium Diff Toxin, A & B, EIA [[id number]]     Order Status: Canceled Specimen: Stool              See below for Radiology    Scheduled Med:   B12-levomefolate calcium-B6  1 tablet Oral Daily    enoxaparin  40 mg Subcutaneous Daily    famotidine  20 mg Oral BID    ferrous sulfate  1 tablet Oral Daily    fidaxomicin  200 mg Oral BID    midodrine  5 mg Oral TID WM    morphine  15 mg Oral BID    piperacillin-tazobactam (ZOSYN) IVPB  4.5 g Intravenous Q8H    sodium chloride 0.9%  10 mL Intravenous Q6H    thiamine  100 mg Oral Daily        Continuous Infusions:       PRN Meds:  loperamide, meclizine, melatonin, ondansetron, oxyCODONE, sodium chloride 0.9%, Flushing PICC Protocol **AND** sodium chloride 0.9% **AND** sodium chloride 0.9%       Assessment/Plan:  Encephalopathy , metabolic   Clostridium Difficile infection and colitis , initial episode   Right lower lobe pneumonia due to unspecified organism   Rt pleural effusion and Rt lower lobe collapse.    Stage IV Metastatic Triple Negative Inflammatory Breast Cancer , Dx 12/2022  Brain and extensive bone metastasis   Pancytopenia , likely treatment induced   Anemia of chronic disease   Elevated LFt's  Nausea and vomiting   Malaise and Debility   Cancer related pain         Plan-  Oncology requested Neurology consult for changes in mental status . Pt with known brain metastasis ,s/p radiation treatment   Continue current antibiotic   Oncology suggested  interventional pulmonology assistance not indicated  at this time.      Monitor blood cell counts , signs of active bleeding, monitor H/H  Ok to continue Lovenox for VTE prophylaxis if Plt count 50,000 or higher   Continue supportive treatment for N/V and cancer related pain.   Consider Palliative Medicine consult . Will discuss with Oncology .     VTE prophylaxis: Lovenox    Patient condition:  Guarded     Anticipated discharge and Disposition:     Home with family     All diagnosis and differential diagnosis have been reviewed; assessment and plan has been documented; I have personally reviewed the labs and test results that are presently available; I have reviewed the patients medication list; I have reviewed the consulting providers response and recommendations. I have reviewed or attempted to review medical records based upon their availability    All of the patient's questions have been  addressed and answered. Patient's is agreeable to the above stated plan. I will continue to monitor closely and make adjustments to medical management as needed.  _____________________________________________________________________    Nutrition Status:    Radiology:  US Chest Mediastinum  EXAMINATION  US CHEST MEDIASTINUM    CLINICAL HISTORY  check for right pleural effusion; Pleural effusion, not elsewhere classified    TECHNIQUE  Focused multiplanar sonographic images of the right thoracic cavity were obtained.    COMPARISON  None available at the time of initial interpretation.    FINDINGS  Exam quality: adequate for evaluation    Minimal volume of pleural fluid is appreciated through the scanned region, with associated localized lung compressive atelectasis.    IMPRESSION  Minimal right pleural effusion.    Electronically signed  by: Isaiah Kwong  Date:    04/10/2023  Time:    16:18      Oscar Guevara MD   04/11/2023

## 2023-04-11 NOTE — PROGRESS NOTES
Subjective:       Patient ID: Esme Marie is a 57 y.o. female.    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 Elliott  Carbo/Dickens -  1/31/23 - 3/22/23    HPI:   58yo F who presented in January 2023 for evaluation of R Breast Cancer. She noticed R breast skin changes in December '22 just prior to her already planned annual MMG. Her breast changes including skin changes, darkening, nipple retraction, and breast firmness has developed rapidly over the last month since diagnosis - made worse after her MMG and Breast MRI. She was found to have extensive R breast and axilla abnormality with breast imaging with MMG/US and Breast MRI. 1/26/23 staing PET with evidence of Diffuse FDG uptake throughout the right breast with overlying skin thickening, right axillary lymphadenopathy and uptake in the right chest wall musculature.Bilateral hypermetabolic cervical lymphadenopathy.Diffuse hypermetabolic osseous metastatic disease. She then underwent Brain MRI which showed evidence of metastatic disease.   Since that time she has begun chemotherapy for her metastatic Triple negative breast cancer. She began Carboplatin + Gemcitabine in February 2023. She underwent brain radiation for her known brain mets in February '23.  Will be starting zometa for her osseous disease in March '23.     Interval History:  Patient seen and examined today. She has been started on appropriate for her therapy and GI symptoms have improved. I spoke with Dr. Solomon this morning and do not feel a need for interventional pulmonology assistance at this time.     Today she seems very confused and completely unlike herself. Family states this started really over the last 48 hours where she will stare off and no longer respond. She was talking to me when I first entered the room but then began  crying and no longer would follow commands, answer questions, and would only stare into space.       OB/GYN History:  Age at Menarche Onset: 14  Menopausal Status: postmenopausal, LMP: 2019  Hysterectomy/Oophorectomy: menopause, at age 54  Hormonal birth control (duration): Yes starting at age 17 used for 2 years.   Pregnancy History:   Age at first live birth: 16  Hormone Replacement Therapy: No, none  Patient did not breast feed      Past Medical History:   Diagnosis Date    Breast cancer     Hyperlipidemia     Knee pain     Menopausal flushing     Menopause 3/2018    Normocytic normochromic anemia     Vitamin D deficiency       Past Surgical History:   Procedure Laterality Date    PLACEMENT, MEDIPORT N/A 2023    Procedure: Placement, Mediport;  Surgeon: Silva Shields MD;  Location: [facility];  Service: General;  Laterality: N/A;    TUBAL LIGATION       Social History     Socioeconomic History    Marital status: Single   Occupational History    Occupation: Supply chain [employer]   Tobacco Use    Smoking status: Never    Smokeless tobacco: Never   Substance and Sexual Activity    Alcohol use: Yes     Comment: occassionally    Drug use: Never    Sexual activity: Yes     Partners: Male     Birth control/protection: None     Social Determinants of Health     Financial Resource Strain: Low Risk     Difficulty of Paying Living Expenses: Not hard at all   Food Insecurity: No Food Insecurity    Worried About Running Out of Food in the Last Year: Never true    Ran Out of Food in the Last Year: Never true   Transportation Needs: No Transportation Needs    Lack of Transportation (Medical): No    Lack of Transportation (Non-Medical): No   Physical Activity: Inactive    Days of Exercise per Week: 0 days    Minutes of Exercise per Session: 0 min   Stress: No Stress Concern Present    Feeling of Stress : Only a little   Social Connections: Moderately Isolated    Frequency of Communication with Friends and Family:  More than three times a week    Frequency of Social Gatherings with Friends and Family: More than three times a week    Attends Mandaeism Services: 1 to 4 times per year    Active Member of Clubs or Organizations: No    Attends Club or Organization Meetings: Never    Marital Status: Never    Housing Stability: Unknown    Unable to Pay for Housing in the Last Year: No    Unstable Housing in the Last Year: No      Family History   Problem Relation Age of Onset    Hypertension Mother     Diabetes Mother     Arthritis Mother     Lung cancer Father 62      Review of patient's allergies indicates:  No Known Allergies   Review of Systems      Objective:      Vitals:    23 1206   BP: 97/63   Pulse: 81   Resp:    Temp: 98.3 °F (36.8 °C)       Physical Exam  Constitutional:       General: She is not in acute distress.     Appearance: Normal appearance. She is not ill-appearing.   HENT:      Head: Normocephalic and atraumatic.      Nose: Nose normal.      Mouth/Throat:      Mouth: Mucous membranes are moist.      Pharynx: Oropharynx is clear.   Eyes:      Extraocular Movements: Extraocular movements intact.      Conjunctiva/sclera: Conjunctivae normal.   Pulmonary:      Effort: Pulmonary effort is normal. No respiratory distress.   Musculoskeletal:         General: No swelling.      Right lower leg: No edema.      Left lower leg: No edema.   Lymphadenopathy:      Comments: RUE lymphedema   Skin:     General: Skin is dry.      Coloration: Skin is not jaundiced.   Neurological:      Mental Status: She is alert. She is disoriented.     Chest - Right breast with skin erythema, thickening, and dimpling consistent with inflammatory changes throughout the entire breast. Breast is firm throughout. Nipple retraction. Enlarging area of skin breakdown around nipple.  R arm lymphedema severe with extension to her R upper chest.     LABS AND IMAGING REVIEWED IN EPIC    Imagin. 2022 BL DG MG/ R [employer] BREAST LIMITED at  [facility]- which revealed in R breast, a developing asymmetry extending from the retroareolar area to involve all quadrants of the right breast, anterior to posterior depth, measuring approximately 12 cm.  There is a 3.3 cm oval, obscured, equal density mass-like component in the lower central right breast, middle to posterior depth.  Triangle marker is noted on the upper outer right breast demarcating the site of clinical concern with underlying 1.7 cm focal asymmetry in the upper outer right breast, posterior depth.  There is diffuse right breast skin thickening most prominent in the periareolar breast.  There is right nipple retraction.  There is a 3.8 cm oval, high density mass/abnormal lymph node with indistinct margin in the right axilla.   On R US, there is a 1.5 x 0.8 x 1.7 cm irregular, non parallel, heterogeneous dermal-subdermal mass with indistinct margin, minimal internal vascularity, and some posterior shadowing in the upper outer right breast at 10 o'clock 12 cm FN,  the site of patient indicated palpable lump, correlating with the mammographic focal asymmetry.  There is associated skin thickening of approximately 0.4 cm.  There is adjacent heterogeneous, echogenic signal within the subdermal fat measuring approximately 2.4 x 1.2 x 2.1 cm.   There is a 2.2 x 1.7 x 3.3 cm irregular, non parallel, hypoechoic mass with angular and indistinct margin, peripheral vascularity, and some posterior enhancement in the lower central right breast at 6 o'clock 9 cm FN, correlating with the mass-like mammographic finding.  There is skin thickening measuring approximately 0.8 cm and edema in the retroareolar right breast with loss of the normal soft tissue planes.  There is associated hyperemia.   There is a 5.3 x 3.9 x 5.5 cm irregular, non parallel, hypoechoic mass with angular and indistinct margins, peripheral vascularity, and posterior shadowing in the upper outer right breast at 10 o'clock 8 cm FN..     There is a  3.6 x 2.1 x 3.8 cm oval, hypoechoic mass/abnormal lymph node with indistinct margin, minimal internal vascularity, and posterior enhancement in the right axilla at 10 o'clock 14 cm FN. BIRADS-4 suspicious; biopsy recommended.       2. 2023 US SOFT TISSUE HEAD NECK [facility]ROID at [facility]- which revealed at area of palpable complaint corresponds with a heterogeneous mixed echogenicity 1.1 x 1 x 0.6 cm lesion with internal color Doppler signal.  Deeper to this there is a 2nd, larger 3.5 x 2.7 x 2.1 cm mixed cystic and solid lesion in the right supraclavicular space.  Cystic components may be related to necrosis or suppurative change.     MRI Breast 23  Findings consistent with advanced stage, aggressive right breast inflammatory carcinoma (particularly given rapid clinical onset) includin. Extensive right breast disease involving the right nipple (with retraction) and all quadrants with mass and non-mass enhancement measuring approximately 17.3 (AP) x 8.3 (TR) x 12 (CC) cm.  There is diffuse right breast skin thickening with dermal-subdermal enhancement along the lower inner and lower central right breast, anterior to posterior depth, and upper outer right breast, middle to posterior depth, likely correlating with the site of palpable lump.  2. Abnormal signal extending posteriorly to involve the right pectoralis major muscle (with asymmetric tenting) and inferiorly and laterally to involve the soft tissue overlying the right chest/upper abdominal wall at the level of the liver concerning for disease progression.    3. Right axillary 4.2 (AP) x 3 (TR) x 3 (CC) cm mass/replaced level 1 lymph node which is biopsy proven carcinoma.  Additional level 1 and level 2 masses/abnormal lymph nodes in conglomerate measuring approximately 4.2 (AP) x 7.1 (TR) which at least abut the pectoralis minor muscle.  Right supraclavicular 4 cm and 2.2 cm masses/abnormal lymph nodes and 1 cm right internal mammary lymph node.  4.  Multiple enhancing sternal lesions concerning for metastatic disease, largest measuring approximately 1.5 cm.  5. Apparent kyphotic deformity of the mid thoracic vertebrae with retrolisthesis.  Recommend further evaluation with PET-CT and to assess for distant metastasis.    MRI Brain 1/19/23  Impression:  1. Multiple enhancing nodules in the posterior fossa with mild surrounding edema, concerning for metastatic disease.  2. Enhancing bone marrow lesions in the occipital bones, concerning for osseous metastases.    1/26/23 PET   Impression:  1. Diffuse FDG uptake throughout the right breast with overlying skin thickening, right axillary lymphadenopathy and uptake in the right chest wall musculature.  2. Bilateral hypermetabolic cervical lymphadenopathy.  3. Diffuse hypermetabolic osseous metastatic disease.    Pathology:  1. 12/30/2022 Ultrasound-guided Core Needle Biopsy Right Breast 6 o'clock 9 cm FN - Invasive carcinoma of no special type (ductal), grade 3   ER less than 1%  CT 0%  HER 2 Negative  Ki67 65%     2. 12/30/2022 Ultrasound-guided Core Needle Biopsy Right Axillary Mass/ Abnormal Lymph Node 10 o'clock 14 cm FN - Invasive carcinoma of no special type (ductal); no lymph node architecture identified    Assessment:   Stage IV Metastatic Triple Negative Inflammatory Breast Cancer - Diagnosed December 2022. Mets to brain and bone.   - Zometa for bone mets - Started March '23  - Radiation Oncology following for brain mets - Completed February '23  - Dr. Shields placed mediport on 1/23/23  - Genetics - Pending  -Chemotherapy associated leukopenia and anemia        Plan:      - She has had an acute mental status change over the last 48 hours. I am of course concerned that this is her disease given recent leptomeningeal enhancement.   - Will consult neurology to rule out possible seizures  - The role of IT MTX is extremely controverstial in this setting. Likely no benefit.   - Unable to give systemic treatment  with active C.diff infection.   - I have spoken with the family and patient about my concerns with the aggressive nature of her disease. I have told them that she may never recover mentally at this point but we would do further workup to see what can be done.     Elizabeth LeJeune, MD  Hematology/Oncology

## 2023-04-11 NOTE — PROGRESS NOTES
[facility] - 4th Floor Medical Telemetry  Wound Care    Patient Name:  Esme Marie   MRN:  [id number]  Date: 4/11/2023  Diagnosis: <principal problem not specified>    History:     Past Medical History:   Diagnosis Date    Breast cancer     Hyperlipidemia     Knee pain     Menopausal flushing     Menopause 3/2018    Normocytic normochromic anemia     Vitamin D deficiency        Social History     Socioeconomic History    Marital status: Single   Occupational History    Occupation: Supply chain [employer]   Tobacco Use    Smoking status: Never    Smokeless tobacco: Never   Substance and Sexual Activity    Alcohol use: Yes     Comment: occassionally    Drug use: Never    Sexual activity: Yes     Partners: Male     Birth control/protection: None     Social Determinants of Health     Financial Resource Strain: Low Risk     Difficulty of Paying Living Expenses: Not hard at all   Food Insecurity: No Food Insecurity    Worried About Running Out of Food in the Last Year: Never true    Ran Out of Food in the Last Year: Never true   Transportation Needs: No Transportation Needs    Lack of Transportation (Medical): No    Lack of Transportation (Non-Medical): No   Physical Activity: Inactive    Days of Exercise per Week: 0 days    Minutes of Exercise per Session: 0 min   Stress: No Stress Concern Present    Feeling of Stress : Only a little   Social Connections: Moderately Isolated    Frequency of Communication with Friends and Family: More than three times a week    Frequency of Social Gatherings with Friends and Family: More than three times a week    Attends Yazidism Services: 1 to 4 times per year    Active Member of Clubs or Organizations: No    Attends Club or Organization Meetings: Never    Marital Status: Never    Housing Stability: Unknown    Unable to Pay for Housing in the Last Year: No    Unstable Housing in the Last Year: No       Precautions:     Allergies as of 04/07/2023    (No Known  Allergies)       WOC Assessment Details/Treatment   WOCN consult visit for R breast breakdown; patient heading to radiation therapy, will continue to follow.

## 2023-04-12 PROBLEM — R56.9 SEIZURES: Status: ACTIVE | Noted: 2023-01-01

## 2023-04-12 NOTE — ASSESSMENT & PLAN NOTE
Continue keppra 1000 mg BID   Give ativan 2 mg PRN for witnessed seizures  Awaiting EEG results  Seizure precautions

## 2023-04-12 NOTE — PLAN OF CARE
Problem: Infection (Pneumonia)  Goal: Resolution of Infection Signs and Symptoms  Outcome: Ongoing, Progressing     Problem: Respiratory Compromise (Pneumonia)  Goal: Effective Oxygenation and Ventilation  Outcome: Ongoing, Progressing     Problem: Impaired Wound Healing  Goal: Optimal Wound Healing  Outcome: Ongoing, Not Progressing

## 2023-04-12 NOTE — NURSING
[facility] [address] [place] [address]  Phone: [phone]  Fax: [phone]  Primary Provider: Doroteo Ma  Pre-op Performing Provider: CHEYANNE BASS      [facility]  Surgeon: Danae Monsivais  expressed wanting to speak with on call Hospitalist regarding patients code status. Code status was discussed and family decided to remain full code at this time.     Patient later became more agitated and hosp. Was paged. Morphine was given for pain and discomfort and patient responded well to the medication, vitals stable.     Around 0140, patients family member reported that patient was trying to get out of the bed again, was extremely agitated. One time dose of Haldol was given. Vital signs were stable after administration.     Later on in the night, patient was trying to get out of the bed, taking off clothes, and becoming combative. Verbal order was given by Dr. Davis for one time dose of Ativan. Medication was given to the patient, vital signs remain stable after administration for the duration of my shift.     Oncoming nurse notified of situation.    

## 2023-04-12 NOTE — HPI
58 y/o F with PMH end stage cancer with brain metastasis who was admitted on 4/7 for SOB, diarrhea, n/v, dizziness, pancytopenic, and recent development of R pleural effusion. pt now having some focal twitching to L eyelid and L upper extremity followed by extreme lethargy, concerning for seizures. pt was started on keppra 1 gm BID and neurology consulted for seizure management.     CT head w/o revealing for cerebellar and leptomeningeal metastatic disease.

## 2023-04-12 NOTE — PROGRESS NOTES
[facility] Hospital Medicine Progress Note        Chief Complaint: Inpatient Follow-up for     HPI:   The patient is a 57-year-old female with history of triple negative breast cancer with metastasis to the Brain into bone. She was previously on chemotherapy and was stopped for pancytopenia and started on xrt but this was discontinued on March 27 reportedly because she developed the right pleural effusion and pt has remained pancytopenic. Today the patient has shortness of breath, diarrhea, nausea, dizziness, vomiting times one week. She denies any chest pain or abdominal pain. She does have some baseline cancer related pain. CT scan of the abdomen and pelvis obtained in the emergency department is significant for colitis which may be related to malignancy and persistent right plural effusion with consolidation and collapse of lower right lung. Patient was started on IV fluids, IV antibiotics Zosyn  in the emergency department. Of note pt became hypotensive after receiving IV pain medication in the ED. Midodrine added for BP support. Stool C.diff toxin PCR is positive . Stool cultures are neg for enteric bacteria. Blood cultures are neg to date.     Pt being admitted under  service for further management of Right lower lobe pneumonia due to unspecified organism   Rt pleural effusion with Rt lower lobe collapse and Clostridium Difficile infection and colitis , initial episode . Pt continued  on IV Zosyn x 5 days (completed)  and Oral Dificid . Medical oncology and Radiation Oncology service and Pulmonology service were consulted. Pt continued on radiation treatment to Rt breast area. Rt thoracentesis and bronchoscopy was discussed, however Oncology suggested  interventional pulmonology assistance not indicated  at this time.     On 4/11/2023 , pt developed some focal twitching to L eyelid and L upper extremity followed by extreme lethargy, concerning for seizures. pt started on keppra  1 gm BID and neurology consulted for seizure management. Pt remained  with decreased level of consciousness and continued to worse throughout the day. CT head done  showed evidence of cerebellar and leptomeningeal metastatic disease. Ammonia level was normal. ABG showed compensated hypercapnia.     Interval Hx:   Pt remains with decreased level of consciousness. Only opens eyes to repeated verbal and tactile stimulation , does not follow commands. Oncology suggested at this point no role for any further cancer directed treatment. Palliative Medicine consulted and care transitioned to comfort focused care. Current code status is DNR.     Objective/physical exam:  General: In no acute distress, afebrile, O2 via NC 2L/min  Chest: Decreased breath sounds Rt lung mid and lower lobe. Left lung- clear. (+) soft tissue swelling , erythema with skin breakdown Rt breast, Swelling extending to Rt upper chest , shoulder and  entire RUE lymphedema.   Heart: RRR, +S1, S2, no appreciable murmur  Abdomen: Soft, nontender, BS +  MSK: Warm, no lower extremity edema, no clubbing or cyanosis, RUE lymphedema   Neurologic: Obtunded. Does not follow commands.     VITAL SIGNS: 24 HRS MIN & MAX LAST   Temp  Min: 97.7 °F (36.5 °C)  Max: 98.1 °F (36.7 °C) 97.8 °F (36.6 °C)   BP  Min: 100/62  Max: 138/80 (!) 132/57     Pulse  Min: 66  Max: 88  76   Resp  Min: 18  Max: 21 18   SpO2  Min: 98 %  Max: 100 % 98 %       Recent Labs   Lab 04/10/23  0407 04/11/23  0444 04/12/23  0550   WBC 5.6 9.2 14.4*   RBC 2.57* 2.89* 2.84*   HGB 7.1* 7.8* 7.7*   HCT 23.2* 26.4* 25.3*   MCV 90.3 91.3 89.1   MCH 27.6 27.0 27.1   MCHC 30.6* 29.5* 30.4*   RDW 17.2* 17.2* 17.2*   * 148 162   MPV 10.9* 11.3* 10.5*       Recent Labs   Lab 04/07/23  1958 04/07/23 2012 04/08/23  0617 04/09/23  0746 04/10/23  0407 04/11/23  1758 04/12/23  0550     --  135* 135* 135*  --  138   K 4.1  --  3.9 3.9 3.6  --  3.8   CO2 28  --  26 27 32*  --  31*   BUN 10.8  --   10.3 8.7* 7.0*  --  5.5*   CREATININE 0.69  --  0.63 0.60 0.60  --  0.58   CALCIUM 8.6  --  8.2* 7.5* 8.2*  --  8.4   PH  --  7.414  --   --   --  7.42  --    MG 1.90  --   --   --  2.00  --  1.80   ALBUMIN 2.6*  --  2.4* 2.2*  --   --  2.4*   ALKPHOS 147  --  132 138  --   --  171*   ALT 96*  --  88* 89*  --   --  126*   AST 62*  --  56* 62*  --   --  88*   BILITOT 0.5  --  0.5 0.5  --   --  0.3          Microbiology Results (last 7 days)       Procedure Component Value Units Date/Time    Blood Culture #1 **CANNOT BE ORDERED STAT** [[id number]]  (Normal) Collected: 04/07/23 2015    Order Status: Completed Specimen: Blood from Antecubital, Left Updated: 04/11/23 2201     CULTURE, BLOOD ([facility]) No Growth At 96 Hours    Blood Culture #2 **CANNOT BE ORDERED STAT** [[id number]]  (Normal) Collected: 04/07/23 2035    Order Status: Completed Specimen: Blood from Arm, Left Updated: 04/11/23 2201     CULTURE, BLOOD ([facility]) No Growth At 96 Hours    Stool Culture **CANNOT BE ORDERED STAT** [[id number]]  (Normal) Collected: 04/08/23 0734    Order Status: Completed Specimen: Stool Updated: 04/10/23 0951     Stool Culture Negative for Salmonella, Shigella, Campylobacter, Vibrio, Aeromonas, Pleisiomonas,Yersinia, or Shiga Toxin 1 and 2.    Clostridium Diff Toxin, A & B, EIA [[id number]]  (Abnormal) Collected: 04/08/23 0734    Order Status: Completed Specimen: Stool Updated: 04/08/23 1354     Clostridium Difficile GDH Antigen Positive     Clostridium Difficile Toxin A/B Negative    Stool Culture **CANNOT BE ORDERED STAT** [[id number]]     Order Status: Sent Specimen: Stool     C Diff Toxin by PCR [[id number]]  (Abnormal) Collected: 04/08/23 0734    Order Status: Completed Specimen: Stool Updated: 04/08/23 1310     Clostridium difficile toxin PCR Detected    Narrative:      The Xpert C.difficile is a qualitative in vitro diagnostic test for rapid detection of the toxin B gene sequences of toxigenic Clostridium difficile from unformed (liquid  or soft) stool specimens collected from patients suspected of having C. difficile infection (CDI).    Clostridium Diff Toxin, A & B, EIA [[id number]]     Order Status: Canceled Specimen: Stool              See below for Radiology    Scheduled Med:   B12-levomefolate calcium-B6  1 tablet Oral Daily    enoxaparin  40 mg Subcutaneous Daily    famotidine  20 mg Oral BID    ferrous sulfate  1 tablet Oral Daily    fidaxomicin  200 mg Oral BID    levetiracetam IV  1,000 mg Intravenous Q12H    midodrine  5 mg Oral TID WM    piperacillin-tazobactam (ZOSYN) IVPB  4.5 g Intravenous Q8H    sodium chloride 0.9%  10 mL Intravenous Q6H    thiamine  100 mg Oral Daily        Continuous Infusions:       PRN Meds:  loperamide, lorazepam, meclizine, melatonin, morphine, ondansetron, sodium chloride 0.9%, Flushing PICC Protocol **AND** sodium chloride 0.9% **AND** sodium chloride 0.9%       Assessment/Plan:  Encephalopathy , metabolic in the setting of progressive leptomeningeal carcinomatosis   Stage IV Metastatic Triple Negative Inflammatory Breast Cancer , Dx 12/2022  Brain and extensive bone metastasis  Suspected Seizures   Clostridium Difficile infection and colitis , initial episode   Right lower lobe pneumonia due to unspecified organism   Rt pleural effusion and Rt lower lobe collapse.    Pancytopenia , likely treatment induced   Anemia of chronic disease   Elevated LFt's  Nausea and vomiting   Malaise and Debility   Cancer related pain  Encounter for Palliative Medicine.      Plan-    Pt had rapid decline in mental status with decreased level of consciousness with progressive leptomeningeal carcinomatosis in the setting of Stage IV Metastatic Triple Negative Inflammatory Breast Cancer.  Suspect possible seizures. Continue Keppra IV.    Per Oncology, at this point there is  no role for any further cancer directed treatment  as they would provide no benefit at this stage. Oncology recommended transition care to comfort focused  care for symptom management.     Palliative Medicine consulted. Family wants to take pt home with hospice. Pt accepted to [facility]. CM consulted to assist with DC planning . Family is requesting DC tomorrow.       Advanced Directives:  I spent 30 mins of face to face discussion regarding advanced directives and end of life planning which included the patient and patients family, who concluded, that they would like to made DNR status. The patient understands the seriousness of his/her condition and would like to make his/her end of life decision known.          VTE prophylaxis: Lovenox     Patient condition:  Guarded     Anticipated discharge and Disposition:     Home with Hospice     All diagnosis and differential diagnosis have been reviewed; assessment and plan has been documented; I have personally reviewed the labs and test results that are presently available; I have reviewed the patients medication list; I have reviewed the consulting providers response and recommendations. I have reviewed or attempted to review medical records based upon their availability    All of the patient's questions have been  addressed and answered. Patient's is agreeable to the above stated plan. I will continue to monitor closely and make adjustments to medical management as needed.  _____________________________________________________________________    Nutrition Status:    Radiology:  CT Head Without Contrast  Narrative: EXAMINATION:  CT HEAD WITHOUT CONTRAST    CLINICAL HISTORY:  Mental status change, unknown cause;    TECHNIQUE:  Multiple axial images were obtained from the base of the brain to the vertex without contrast administration.  Sagittal and coronal reconstructions were performed. .Automatic exposure control  (AEC) is utilized to reduce patient radiation exposure.    COMPARISON:  MRI from 03/24/2023 and CT scan from 03/23/2023    FINDINGS:  The patient has known cerebellar and leptomeningeal metastasis.   This is not as well visualized on today's examination as well as on the previous MRI.  No obvious mass or lesion is seen on this noncontrast CT.  No acute hemorrhage or infarction is seen.  The ventricles and basilar cisterns appear normal.  Brain parenchyma appears grossly unremarkable.    Posterior fossa appears normal.  The calvarium is intact.  The paranasal sinuses appear grossly unremarkable.  Impression: Patient has known cerebellar and leptomeningeal metastatic disease.  This is not well appreciated on this noncontrast CT.  Correlation with contrast enhanced MRI is recommended if evaluation for progression of disease is needed.    Electronically signed by: Leoncio Valente  Date:    04/11/2023  Time:    18:41      Oscar Guevara MD   04/12/2023

## 2023-04-12 NOTE — CONSULTS
Inpatient consult to Palliative Care  Consult performed by: ASHLEY Mares  Consult ordered by: Elizabeth Kennedy Lejeune, MD    Patient Name: Esme Marie   MRN: [id number]   Admission Date: 4/7/2023   Hospital Length of Stay: 5   Attending Provider: Oscar Guevara MD   Consulting Provider: Radha RAMIREZ  Reason for Consult: Goals of Care  Primary Care Physician: Elizabeth K Lejeune, MD     Principal Problem: <principal problem not specified>     Patient information was obtained from relative(s) and ER records.      Final diagnoses:  [R00.0] Tachycardia  [D61.810, T45.1X5A] Pancytopenia due to antineoplastic chemotherapy  [J96.01] Acute respiratory failure with hypoxia  [J90] Pleural effusion on right  [K52.9] Colitis  [C50.919] Triple negative breast cancer  [R53.1] Generalized weakness (Primary)     Assessment/Plan:     I reviewed the patient and family's understanding of the seriousness of the illness and its expected prognosis. We discussed the patient's goals of care and treatment preferences. We discussed the difference between palliative and curative medicine.  I clarified current code status. I identified the surrogate decision maker or health care POA. II answered all questions and we formulated a plan including recommendations for symptom management and how to best achieve goals of care.       Advance Care Planning     Date: 04/12/2023    Today a meeting took place: other (conference room) 4th floor    Patient Participation: Patient is unable to participate     Attendees (Name and  Relationship to patient):  4 children, siblings, fiance and multiple grandchildren    Staff attendees (Name and  Role): SALMA Price palliative NP    ACP Conversation (General): Understanding of current condition see below     Code Status: DNR; status confirmed/order placed in chart    ACP Documents: None    Goals of care: The family endorses that what is most important right now is to focus on comfort and  QOL     Accordingly, we have decided that the best plan to meet the patient's goals includes enrolling in hospice care      Recommendations/  Follow-up tasks: Other (specify below) Met with patient's sister and other siblings, 4 children, SO and multiple other family members--introduced service and discussed patient's history and current condition.  Family report that patient was diagnosed in December of 2022 and metastasis present upon diagnosis.  They informed that she was undergoing treatment but having issues with blood counts. Reports that XRT was fairly successful to brain, but continued with pancytopenia.        Family informed that they spoke to oncology and understand that patient is too ill to receive treatment.  Discussed hospice services at length in the home, NH and inpatient setting.  Family expressed that they would like for patient to be home.    Discussed code status and the likely poor outcomes that would result from resuscitation.  Children left the conference room to discuss among themselves and informed that they will notify staff of their answer.  Offered support and informed I would continue to follow.      Met with children later in the morning who informed of their wishes for DNR and hospice services at home.  Orders placed in EMR and medical team updated.      Symptom  management:  will increase morphine to hourly PRN        Recommendations:   Morphine 4 mg Q1 hour PRN        History of Present Illness:     Patient is a 56 yo female with PMHx of triple negative breast cancer with metastasis to the brain and bone who presented to the ED with SOB, diarrhea, nausdea, dizziness and vomiting for one week.  She had been on chemotherapy which was stopped for pancytopenia.  She was then started on XRT which was discontinued on 3/27 for development of right pleural effusion.  CT of abdomen/pelvis revealed colitis which may be related to malignancy and persistent right pleural effusion with  consolidation and collapse of lower right lung.  Oncololgy consulted who discussed progression of disease and recommendations for hospice.  Patient is currently sleeping in bed with family at bedside and just received IV medication.  Palliative care consulted to discuss goals of care.       Active Ambulatory Problems     Diagnosis Date Noted    Mixed hyperlipidemia 05/25/2022    Obesity 05/25/2022    Vitamin D deficiency 05/25/2022    Prediabetes 05/25/2022    Bilateral primary osteoarthritis of knee 05/25/2022    Normocytic normochromic anemia 05/25/2022    Primary osteoarthritis of left knee 10/17/2022    BMI 35.0-35.9,adult 10/17/2022    Breast pain, left 12/20/2022    Axillary adenopathy 12/20/2022    Cellulitis of right breast 12/20/2022    Lymphadenopathy, supraclavicular 01/05/2023    Skin infection 01/05/2023    Breast cancer metastasized to axillary lymph node, right 01/11/2023    Breast cancer metastasized to bone, unspecified laterality 01/30/2023    Encounter for chemotherapy management 02/16/2023    Chemotherapy-induced nausea 02/16/2023    Encounter for prevention of neutropenia due to chemotherapy 03/01/2023    Cancer related pain 03/23/2023    Lymphedema 03/29/2023     Resolved Ambulatory Problems     Diagnosis Date Noted    No Resolved Ambulatory Problems     Past Medical History:   Diagnosis Date    Breast cancer     Hyperlipidemia     Knee pain     Menopausal flushing     Menopause 3/2018        Past Surgical History:   Procedure Laterality Date    PLACEMENT, MEDIPORT N/A 01/23/2023    Procedure: Placement, Mediport;  Surgeon: Silva Shields MD;  Location: [facility];  Service: General;  Laterality: N/A;    TUBAL LIGATION          Review of patient's allergies indicates:  No Known Allergies       Current Facility-Administered Medications:     B12-levomefolate calcium-B6 (FOLBIC RF) 2-1.13-25 mg tablet 1 tablet, 1 tablet, Oral, Daily, Oscar Guevara MD, 1 tablet at 04/11/23 0835    enoxaparin  injection 40 mg, 40 mg, Subcutaneous, Daily, Flako CAMILO Marina, DO, 40 mg at 04/11/23 1721    famotidine tablet 20 mg, 20 mg, Oral, BID, Oscar Guevara MD, 20 mg at 04/11/23 0835    ferrous sulfate tablet 1 each, 1 tablet, Oral, Daily, Oscar Guevara MD, 1 each at 04/11/23 0835    fidaxomicin tablet 200 mg, 200 mg, Oral, BID, Oscar Guevara MD, 200 mg at 04/10/23 2134    levETIRAcetam in NaCl (iso-os) IVPB 1,000 mg, 1,000 mg, Intravenous, Q12H, Sbarina Davis MD, Stopped at 04/12/23 0930    loperamide capsule 2 mg, 2 mg, Oral, PRN, Flako CAMILO Marina, DO, 2 mg at 04/08/23 0944    LORazepam (ATIVAN) injection 1 mg, 1 mg, Intravenous, Q15 Min PRN, Sabrina Davis MD    meclizine tablet 25 mg, 25 mg, Oral, TID PRN, Kodak Hernandes MD    melatonin tablet 6 mg, 6 mg, Oral, Nightly PRN, Flako CAMILO Marina, DO    midodrine tablet 5 mg, 5 mg, Oral, TID WM, Oscar Guevara MD, 5 mg at 04/11/23 1721    morphine injection 4 mg, 4 mg, Intravenous, Q4H PRN, Emma Almazan, AGACNP-BC, 4 mg at 04/11/23 2324    ondansetron injection 4 mg, 4 mg, Intravenous, Q8H PRN, Flako CAMILO Marina, DO, 4 mg at 04/11/23 1153    oxyCODONE immediate release tablet 10 mg, 10 mg, Oral, Q8H PRN, Flako CAMILO Marina, DO, 10 mg at 04/08/23 0027    piperacillin-tazobactam (ZOSYN) 4.5 g in dextrose 5 % in water (D5W) 5 % 100 mL IVPB (MB+), 4.5 g, Intravenous, Q8H, Oscar Guevara MD, Stopped at 04/12/23 0253    sodium chloride 0.9% flush 10 mL, 10 mL, Intravenous, PRN, Flako Almanzame, DO    Flushing PICC Protocol, , , Until Discontinued **AND** sodium chloride 0.9% flush 10 mL, 10 mL, Intravenous, Q6H, 10 mL at 04/10/23 1800 **AND** sodium chloride 0.9% flush 10 mL, 10 mL, Intravenous, PRN, Flako Gray, DO    thiamine tablet 100 mg, 100 mg, Oral, Daily, Oscar Guevara MD, 100 mg at 04/11/23 0835     loperamide, lorazepam, meclizine, melatonin, morphine, ondansetron, oxyCODONE, sodium chloride 0.9%, Flushing PICC Protocol **AND** sodium  "chloride 0.9% **AND** sodium chloride 0.9%     Family History   Problem Relation Age of Onset    Hypertension Mother     Diabetes Mother     Arthritis Mother     Lung cancer Father 62        Review of Systems         Objective:   /62   Pulse 74   Temp 97.7 °F (36.5 °C) (Oral)   Resp 18   Ht 5' 5" (1.651 m)   Wt 102.9 kg (226 lb 13.7 oz)   SpO2 98%   Breastfeeding No   BMI 37.75 kg/m²      Physical Exam  Constitutional:       Appearance: She is ill-appearing.   HENT:      Head: Normocephalic.   Eyes:      Pupils: Pupils are equal, round, and reactive to light.   Cardiovascular:      Rate and Rhythm: Tachycardia present.   Pulmonary:      Breath sounds: Rhonchi present.   Abdominal:      Palpations: Abdomen is soft.   Musculoskeletal:      Comments: sarcopenia   Skin:     General: Skin is warm.   Neurological:      Comments: lethargic         FAMILY CONTACTS:     Review of Symptoms  Review of Symptoms      Symptom Assessment (ESAS 0-10 Scale)  Pain:  0  Dyspnea:  0  Anxiety:  0  Nausea:  0  Depression:  0  Anorexia:  0  Fatigue:  0  Insomnia:  0  Restlessness:  0  Agitation:  0         Bowel Management Plan (BMP):  Yes      Performance Status:  30    Psychosocial/Cultural:   See Palliative Psychosocial Note: Yes  Patient has 4 adult children and lives in [place] with her SO. She works for [employer].   **Primary  to Follow**  Palliative Care  Consult: No    Advance Care Planning   Advance Directives:   Do Not Resuscitate Status: Yes      Decision Making:  Family answered questions  Goals of Care: The family endorses that what is most important right now is to focus on comfort and QOL     Accordingly, we have decided that the best plan to meet the patient's goals includes enrolling in hospice care        PAINAD: NA    Caregiver burden formerly assessed: yes        > 50% of 65 min of encounter was spent in chart review, face to face discussion of goals of care, symptom " assessment, coordination of care and emotional support.         Radha Price FNP, [facility]  Palliative Medicine  [facility]

## 2023-04-12 NOTE — PROGRESS NOTES
Subjective:       Patient ID: Esme Marie is a 57 y.o. female.    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 Elliott  Carbo/Andrew -  1/31/23 - 3/22/23    HPI:   56yo F who presented in January 2023 for evaluation of R Breast Cancer. She noticed R breast skin changes in December '22 just prior to her already planned annual MMG. Her breast changes including skin changes, darkening, nipple retraction, and breast firmness has developed rapidly over the last month since diagnosis - made worse after her MMG and Breast MRI. She was found to have extensive R breast and axilla abnormality with breast imaging with MMG/US and Breast MRI. 1/26/23 staing PET with evidence of Diffuse FDG uptake throughout the right breast with overlying skin thickening, right axillary lymphadenopathy and uptake in the right chest wall musculature.Bilateral hypermetabolic cervical lymphadenopathy.Diffuse hypermetabolic osseous metastatic disease. She then underwent Brain MRI which showed evidence of metastatic disease.   Since that time she has begun chemotherapy for her metastatic Triple negative breast cancer. She began Carboplatin + Gemcitabine in February 2023. She underwent brain radiation for her known brain mets in February '23.  Will be starting zometa for her osseous disease in March '23.     Interval History:  Patient seen and examined today. She has been started on appropriate for her therapy and GI symptoms have improved. I spoke with Dr. Solomon this morning and do not feel a need for interventional pulmonology assistance at this time.     Today she seems very confused and completely unlike herself. Family states this started really over the last 48 hours where she will stare off and no longer respond. She was talking to me when I first entered the room but then began  crying and no longer would follow commands, answer questions, and would only stare into space.     Today 23  I saw and examined patient this morning. Spoke with her fiance, and 2 family members at bedside. I told them that due to her progressive leptomeningeal carcinomatosis and her continued altered mental state that there was no role for any further therapy measures as they would provide no benefit at this stage. I recommended transitioning from treating her cancer to treating her symptoms of having cancer. I recommended discussions with palliative care as she has 5 children who need to make this decision, but ultimately I recommended home with hospice.       OB/GYN History:  Age at Menarche Onset: 14  Menopausal Status: postmenopausal, LMP: 2019  Hysterectomy/Oophorectomy: menopause, at age 54  Hormonal birth control (duration): Yes starting at age 17 used for 2 years.   Pregnancy History:   Age at first live birth: 16  Hormone Replacement Therapy: No, none  Patient did not breast feed      Past Medical History:   Diagnosis Date    Breast cancer     Hyperlipidemia     Knee pain     Menopausal flushing     Menopause 3/2018    Normocytic normochromic anemia     Vitamin D deficiency       Past Surgical History:   Procedure Laterality Date    PLACEMENT, MEDIPORT N/A 2023    Procedure: Placement, Mediport;  Surgeon: Silva Shields MD;  [facility];  Service: General;  Laterality: N/A;    TUBAL LIGATION       Social History     Socioeconomic History    Marital status: Single   Occupational History    Occupation: [employer]   Tobacco Use    Smoking status: Never    Smokeless tobacco: Never   Substance and Sexual Activity    Alcohol use: Yes     Comment: occassionally    Drug use: Never    Sexual activity: Yes     Partners: Male     Birth control/protection: None     Social Determinants of Health     Financial Resource Strain: Low Risk     Difficulty of Paying Living Expenses: Not  hard at all   Food Insecurity: No Food Insecurity    Worried About Running Out of Food in the Last Year: Never true    Ran Out of Food in the Last Year: Never true   Transportation Needs: No Transportation Needs    Lack of Transportation (Medical): No    Lack of Transportation (Non-Medical): No   Physical Activity: Inactive    Days of Exercise per Week: 0 days    Minutes of Exercise per Session: 0 min   Stress: No Stress Concern Present    Feeling of Stress : Only a little   Social Connections: Moderately Isolated    Frequency of Communication with Friends and Family: More than three times a week    Frequency of Social Gatherings with Friends and Family: More than three times a week    Attends Gnosticism Services: 1 to 4 times per year    Active Member of Clubs or Organizations: No    Attends Club or Organization Meetings: Never    Marital Status: Never    Housing Stability: Unknown    Unable to Pay for Housing in the Last Year: No    Unstable Housing in the Last Year: No      Family History   Problem Relation Age of Onset    Hypertension Mother     Diabetes Mother     Arthritis Mother     Lung cancer Father 62      Review of patient's allergies indicates:  No Known Allergies   Review of Systems      Objective:      Vitals:    04/12/23 0756   BP: 100/62   Pulse: 74   Resp: 18   Temp: 97.7 °F (36.5 °C)       Physical Exam  Constitutional:       General: She is not in acute distress.     Appearance: Normal appearance. She is not ill-appearing.   HENT:      Head: Normocephalic and atraumatic.      Nose: Nose normal.      Mouth/Throat:      Mouth: Mucous membranes are moist.      Pharynx: Oropharynx is clear.   Eyes:      Extraocular Movements: Extraocular movements intact.      Conjunctiva/sclera: Conjunctivae normal.   Pulmonary:      Effort: Pulmonary effort is normal. No respiratory distress.   Musculoskeletal:         General: No swelling.      Right lower leg: No edema.      Left lower leg: No edema.    Lymphadenopathy:      Comments: RUE lymphedema   Skin:     General: Skin is dry.      Coloration: Skin is not jaundiced.   Neurological:      Mental Status: She is alert. She is disoriented.     Chest - Right breast with skin erythema, thickening, and dimpling consistent with inflammatory changes throughout the entire breast. Breast is firm throughout. Nipple retraction. Enlarging area of skin breakdown around nipple.  R arm lymphedema severe with extension to her R upper chest.     LABS AND IMAGING REVIEWED IN EPIC    Imagin. 2022 BL DG MG/ R US BREAST LIMITED at [facility]- which revealed in R breast, a developing asymmetry extending from the retroareolar area to involve all quadrants of the right breast, anterior to posterior depth, measuring approximately 12 cm.  There is a 3.3 cm oval, obscured, equal density mass-like component in the lower central right breast, middle to posterior depth.  Triangle marker is noted on the upper outer right breast demarcating the site of clinical concern with underlying 1.7 cm focal asymmetry in the upper outer right breast, posterior depth.  There is diffuse right breast skin thickening most prominent in the periareolar breast.  There is right nipple retraction.  There is a 3.8 cm oval, high density mass/abnormal lymph node with indistinct margin in the right axilla.   On R US, there is a 1.5 x 0.8 x 1.7 cm irregular, non parallel, heterogeneous dermal-subdermal mass with indistinct margin, minimal internal vascularity, and some posterior shadowing in the upper outer right breast at 10 o'clock 12 cm FN,  the site of patient indicated palpable lump, correlating with the mammographic focal asymmetry.  There is associated skin thickening of approximately 0.4 cm.  There is adjacent heterogeneous, echogenic signal within the subdermal fat measuring approximately 2.4 x 1.2 x 2.1 cm.   There is a 2.2 x 1.7 x 3.3 cm irregular, non parallel, hypoechoic mass with angular and  indistinct margin, peripheral vascularity, and some posterior enhancement in the lower central right breast at 6 o'clock 9 cm FN, correlating with the mass-like mammographic finding.  There is skin thickening measuring approximately 0.8 cm and edema in the retroareolar right breast with loss of the normal soft tissue planes.  There is associated hyperemia.   There is a 5.3 x 3.9 x 5.5 cm irregular, non parallel, hypoechoic mass with angular and indistinct margins, peripheral vascularity, and posterior shadowing in the upper outer right breast at 10 o'clock 8 cm FN..     There is a 3.6 x 2.1 x 3.8 cm oval, hypoechoic mass/abnormal lymph node with indistinct margin, minimal internal vascularity, and posterior enhancement in the right axilla at 10 o'clock 14 cm FN. BIRADS-4 suspicious; biopsy recommended.       2. 2023 US SOFT TISSUE HEAD NECK AND THYROID at [facility]- which revealed at area of palpable complaint corresponds with a heterogeneous mixed echogenicity 1.1 x 1 x 0.6 cm lesion with internal color Doppler signal.  Deeper to this there is a 2nd, larger 3.5 x 2.7 x 2.1 cm mixed cystic and solid lesion in the right supraclavicular space.  Cystic components may be related to necrosis or suppurative change.     MRI Breast 23  Findings consistent with advanced stage, aggressive right breast inflammatory carcinoma (particularly given rapid clinical onset) includin. Extensive right breast disease involving the right nipple (with retraction) and all quadrants with mass and non-mass enhancement measuring approximately 17.3 (AP) x 8.3 (TR) x 12 (CC) cm.  There is diffuse right breast skin thickening with dermal-subdermal enhancement along the lower inner and lower central right breast, anterior to posterior depth, and upper outer right breast, middle to posterior depth, likely correlating with the site of palpable lump.  2. Abnormal signal extending posteriorly to involve the right pectoralis major muscle (with  asymmetric tenting) and inferiorly and laterally to involve the soft tissue overlying the right chest/upper abdominal wall at the level of the liver concerning for disease progression.    3. Right axillary 4.2 (AP) x 3 (TR) x 3 (CC) cm mass/replaced level 1 lymph node which is biopsy proven carcinoma.  Additional level 1 and level 2 masses/abnormal lymph nodes in conglomerate measuring approximately 4.2 (AP) x 7.1 (TR) which at least abut the pectoralis minor muscle.  Right supraclavicular 4 cm and 2.2 cm masses/abnormal lymph nodes and 1 cm right internal mammary lymph node.  4. Multiple enhancing sternal lesions concerning for metastatic disease, largest measuring approximately 1.5 cm.  5. Apparent kyphotic deformity of the mid thoracic vertebrae with retrolisthesis.  Recommend further evaluation with PET-CT and to assess for distant metastasis.    MRI Brain 1/19/23  Impression:  1. Multiple enhancing nodules in the posterior fossa with mild surrounding edema, concerning for metastatic disease.  2. Enhancing bone marrow lesions in the occipital bones, concerning for osseous metastases.    1/26/23 PET   Impression:  1. Diffuse FDG uptake throughout the right breast with overlying skin thickening, right axillary lymphadenopathy and uptake in the right chest wall musculature.  2. Bilateral hypermetabolic cervical lymphadenopathy.  3. Diffuse hypermetabolic osseous metastatic disease.    Pathology:  1. 12/30/2022 Ultrasound-guided Core Needle Biopsy Right Breast 6 o'clock 9 cm FN - Invasive carcinoma of no special type (ductal), grade 3   ER less than 1%  ID 0%  HER 2 Negative  Ki67 65%     2. 12/30/2022 Ultrasound-guided Core Needle Biopsy Right Axillary Mass/ Abnormal Lymph Node 10 o'clock 14 cm FN - Invasive carcinoma of no special type (ductal); no lymph node architecture identified    Assessment:   Stage IV Metastatic Triple Negative Inflammatory Breast Cancer - Diagnosed December 2022. Mets to brain and bone.  Now with leptomeningeal carcinomatosis  - Zometa for bone mets - Started March '23  - Radiation Oncology following for brain mets - Completed February '23  - Dr. Shields placed mediport on 1/23/23  - Genetics - Pending  -Chemotherapy associated leukopenia and anemia        Plan:      - Her leptomeningeal carcinomatosis has progressed to the point that any further therapy - IT or XRT would not be beneficial and would offer her the toxicities of such therapy with no improvement in her current clinical state.   - Would no recommend further neuro evaluation or radiation therapy at this time.   - I spoke in detail with the family this morning about my recommendations to pursue hospice care and to discuss this further with palliative care. They were agreeable to placing the consult. Final decisions over code status will need to be had with her children as they are POA and patient currently cannot speak for herself.       Elizabeth LeJeune, MD  Hematology/Oncology

## 2023-04-12 NOTE — PROGRESS NOTES
Infectious Diseases Progress Note  57-year-old female with past medical history of triple negative breast cancer with metastasis to the brain and bone, was on chemotherapy which was put on hold due to pancytopenia as well as was on radiation therapy discontinued 03/27/2023, apparently at time of developing pleural effusion with persistent pancytopenia, is admitted to [facility] on 04/07/2023 with shortness of breath, diarrhea, nausea, vomiting, and dizziness.  She was evaluated and noted to have no fevers but pancytopenic with WBC 1.6, platelets 76 and anemic.  Elevated transaminases with AST 62 and ALT 96, low albumin.  Urinalysis was not impressive.  Chest x-ray showed new right pleural effusion and right lower lung zone atelectasis and/or infiltrate.  CT chest, abdomen and pelvis without contrast showed unchanged soft tissue inflammation around the right shoulder, right axilla and right chest wall, right breast asymmetric density and skin thickening to be correlated with mammography findings, unchanged right axillary lymphadenopathy, persistent right pleural effusion and right lower lung lobe collapse consolidation, cecum and ascending colon irregular mural thickening and surrounding inflammatory changes which may be related to colitis with concern for malignancy, pericecal multiple nonenlarged lymph nodes, extensive skeletal sclerotic lesion consistent with metastasis.  Ultrasound of the chest with minimal right pleural effusion.  She is on antibiotic coverage with Zosyn and Dificid.    Subjective:  Lying in bed, lethargic. Pt afebrile. Multiple family members at bedside.     Review of Systems   Unable to perform ROS: Mental status change     Review of patient's allergies indicates:  No Known Allergies    Past Medical History:   Diagnosis Date    Breast cancer     Hyperlipidemia     Knee pain     Menopausal flushing     Menopause 3/2018    Normocytic normochromic anemia     Vitamin  D deficiency        Past Surgical History:   Procedure Laterality Date    PLACEMENT, MEDIPORT N/A 01/23/2023    Procedure: Placement, Mediport;  Surgeon: Silva Shields MD;  Location: [facility];  Service: General;  Laterality: N/A;    TUBAL LIGATION         Social History     Socioeconomic History    Marital status: Single   Occupational History    Occupation: Supply [employer]   Tobacco Use    Smoking status: Never    Smokeless tobacco: Never   Substance and Sexual Activity    Alcohol use: Yes     Comment: occassionally    Drug use: Never    Sexual activity: Yes     Partners: Male     Birth control/protection: None     Social Determinants of Health     Financial Resource Strain: Low Risk     Difficulty of Paying Living Expenses: Not hard at all   Food Insecurity: No Food Insecurity    Worried About Running Out of Food in the Last Year: Never true    Ran Out of Food in the Last Year: Never true   Transportation Needs: No Transportation Needs    Lack of Transportation (Medical): No    Lack of Transportation (Non-Medical): No   Physical Activity: Inactive    Days of Exercise per Week: 0 days    Minutes of Exercise per Session: 0 min   Stress: No Stress Concern Present    Feeling of Stress : Only a little   Social Connections: Moderately Isolated    Frequency of Communication with Friends and Family: More than three times a week    Frequency of Social Gatherings with Friends and Family: More than three times a week    Attends Yarsani Services: 1 to 4 times per year    Active Member of Clubs or Organizations: No    Attends Club or Organization Meetings: Never    Marital Status: Never    Housing Stability: Unknown    Unable to Pay for Housing in the Last Year: No    Unstable Housing in the Last Year: No         Scheduled Meds:   B12-levomefolate calcium-B6  1 tablet Oral Daily    enoxaparin  40 mg Subcutaneous Daily    famotidine  20 mg Oral BID    ferrous sulfate  1 tablet Oral Daily    fidaxomicin  200 mg  "Oral BID    midodrine  5 mg Oral TID WM    morphine  15 mg Oral BID    piperacillin-tazobactam (ZOSYN) IVPB  4.5 g Intravenous Q8H    sodium chloride 0.9%  10 mL Intravenous Q6H    thiamine  100 mg Oral Daily     Continuous Infusions:  PRN Meds:loperamide, meclizine, melatonin, ondansetron, oxyCODONE, sodium chloride 0.9%, Flushing PICC Protocol **AND** sodium chloride 0.9% **AND** sodium chloride 0.9%    Objective:  /82   Pulse 81   Temp 97.7 °F (36.5 °C) (Oral)   Resp (!) 21   Ht 5' 5" (1.651 m)   Wt 102.9 kg (226 lb 13.7 oz)   SpO2 100%   Breastfeeding No   BMI 37.75 kg/m²     Physical Exam:   Physical Exam  Vitals reviewed.   Constitutional:       General: She is not in acute distress.     Comments: Lethargic. Family at the bedside   HENT:      Head: Normocephalic and atraumatic.   Eyes:      Pupils: Pupils are equal, round, and reactive to light.   Cardiovascular:      Rate and Rhythm: Normal rate and regular rhythm.   Pulmonary:      Effort: Pulmonary effort is normal.      Breath sounds: Normal breath sounds.   Abdominal:      General: Bowel sounds are normal. There is no distension.      Palpations: Abdomen is soft.      Tenderness: There is no abdominal tenderness.   Genitourinary:     Comments: No suprapubic tenderness  Musculoskeletal:      Cervical back: Neck supple.      Right lower leg: No edema.      Left lower leg: Edema present.   Skin:     Findings: No rash.      Comments: Right breast cancer wound with induration   Neurological:      Mental Status: Lethargic, difficult to arouse. Does not follow commands        Imaging  4/11/23 CT head without  Impression:       Patient has known cerebellar and leptomeningeal metastatic disease.  This is not well appreciated on this noncontrast CT.  Correlation with contrast enhanced MRI is recommended if evaluation for progression of disease is needed.       Lab Review   Recent Results (from the past 24 hour(s))   CBC with Differential    Collection " Time: 04/11/23  4:44 AM   Result Value Ref Range    WBC 9.2 4.5 - 11.5 x10(3)/mcL    RBC 2.89 (L) 4.20 - 5.40 x10(6)/mcL    Hgb 7.8 (L) 12.0 - 16.0 g/dL    Hct 26.4 (L) 37.0 - 47.0 %    MCV 91.3 80.0 - 94.0 fL    MCH 27.0 27.0 - 31.0 pg    MCHC 29.5 (L) 33.0 - 36.0 g/dL    RDW 17.2 (H) 11.5 - 17.0 %    Platelet 148 130 - 400 x10(3)/mcL    MPV 11.3 (H) 7.4 - 10.4 fL    NRBC% 1.5 %   Manual Differential    Collection Time: 04/11/23  4:44 AM   Result Value Ref Range    Neut Man 71 %    Lymph Man 4 %    Monocyte Man 7 %    [place] Man 10 %    Myelo Man 6 %    Promyelo Man 2 %    Instr WBC 9.5 x10(3)/mcL    Abs Mono 0.665 0.1 - 1.3 x10(3)/mcL    Abs Lymp 0.38 (L) 0.6 - 4.6 x10(3)/mcL    Abs Neut 8.455 2.1 - 9.2 x10(3)/mcL    NRBC Man 3 %    RBC Morph Abnormal (A) Normal    Anisocyte 1+ (A) (none)    Poik 1+ (A) (none)    Hypochrom 1+ (A) (none)    Toxic Gran 1+ (A) (none)    Platelet Est Normal Normal, Adequate   POCT ARTERIAL BLOOD GAS    Collection Time: 04/11/23  5:58 PM   Result Value Ref Range    POC PH 7.42 7.35 - 7.45    POC PCO2 58 (AA) 19 - 50 mmHg    POC PO2 74 (A) 80 - 100 mmHg    POC HEMOGLOBIN 8.0 (A) 12 - 16 g/dL    POC SATURATED O2 95.0 %    POC O2Hb 94.2 94.0 - 97.0 %    POC COHb 1.6 %    POC MetHb 1.1 0.40 - 1.5 %    POC Potassium 3.7 3.5 - 5.0 mmol/l    POC Sodium 132 (A) 137 - 145 mmol/l    POC Ionized Calcium 1.14 1.12 - 1.23 mmol/l    Correct Temperature (PH) 7.42 7.35 - 7.45    Corrected Temperature (pCO2) 58 (AA) 19 - 50 mmHg    Corrected Temperature (pO2) 74 (A) 80 - 100 mmHg    POC HCO3 37.6 (A) 22.0 - 26.0 mmol/l    Base Deficit 11.7 (A) -2.00 - 2.00 mmol/l    POC Temp 37.0 °C    Specimen source Arterial sample    POCT glucose    Collection Time: 04/11/23  6:03 PM   Result Value Ref Range    POCT Glucose 121 (H) 70 - 110 mg/dL   Ammonia    Collection Time: 04/11/23  6:04 PM   Result Value Ref Range    Ammonia Level 38.5 18.0 - 72.0 umol/L       Assessment/Plan:  1. C difficile colitis  2. Right  pneumonitis with pleural effusion  3. Encephalopathy  4. Metastatic breast cancer stage IV  5. Pancytopenia  6. Transaminitis      -Continue Zosyn #4 and Dificid #3  -No fever and no leukocytosis  -4/7 Blood cultures negative thus far, follow  -Stool for c-diff PCR (+) and toxin (-)  -Maintain strict contact isolation for c-diff  -Mental status change today, CT head without contrast showed no new findings  -Neurology consulted for possible seizures, follow recs  -Oncology on board, inputs noted  -Continue wound care  -Discussed with family and nursing.

## 2023-04-12 NOTE — SUBJECTIVE & OBJECTIVE
Past Medical History:   Diagnosis Date    Breast cancer     Hyperlipidemia     Knee pain     Menopausal flushing     Menopause 3/2018    Normocytic normochromic anemia     Vitamin D deficiency        Past Surgical History:   Procedure Laterality Date    PLACEMENT, MEDIPORT N/A 01/23/2023    Procedure: Placement, Mediport;  Surgeon: Silva Shields MD;  Location: [facility];  Service: General;  Laterality: N/A;    TUBAL LIGATION         Review of patient's allergies indicates:  No Known Allergies    Current Neurological Medications:     No current facility-administered medications on file prior to encounter.     Current Outpatient Medications on File Prior to Encounter   Medication Sig    meclizine (ANTIVERT) 25 mg tablet Take 1 tablet (25 mg total) by mouth 3 (three) times daily as needed for Dizziness.    morphine (MS CONTIN) 15 MG 12 hr tablet Take 1 tablet (15 mg total) by mouth every 8 (eight) hours as needed for Pain.    OLANZapine (ZYPREXA) 5 MG tablet Take 1 tablet (5 mg total) by mouth every evening. Take as directed days 1-4 of each chemotherapy cycle.    ondansetron (ZOFRAN-ODT) 8 MG TbDL Take 1 tablet (8 mg total) by mouth every 8 (eight) hours as needed (nausea/vomiting).    oxyCODONE (ROXICODONE) 10 mg Tab immediate release tablet Take 1 tablet (10 mg total) by mouth every 4 (four) hours as needed for Pain.    prochlorperazine (COMPAZINE) 10 MG tablet Take 1 tablet (10 mg total) by mouth every 6 (six) hours as needed (for nausea). 2nd choice, can cause drowsiness.    acetaminophen (TYLENOL) 325 MG tablet Take 2 tablets (650 mg total) by mouth every 8 (eight) hours as needed for Pain (or equal to 100.4 degree F).    docusate sodium (COLACE) 100 MG capsule Take 1 capsule (100 mg total) by mouth 2 (two) times daily.    melatonin (MELATIN) 3 mg tablet Take 1 tablet (3 mg total) by mouth nightly as needed for Insomnia.    morphine (MS CONTIN) 15 MG 12 hr tablet Take 1 tablet (15 mg total) by mouth 2 (two)  times daily.    naproxen sodium (ANAPROX) 220 MG tablet Take 220 mg by mouth every 12 (twelve) hours. Takes as directed prn    omega-3 fatty acids/fish oil (FISH OIL-OMEGA-3 FATTY ACIDS) 300-1,000 mg capsule Take by mouth once daily.    polyethylene glycol (GLYCOLAX) 17 gram PwPk Take 17 g by mouth daily as needed ((Second Choice)).    senna-docusate 8.6-50 mg (PERICOLACE) 8.6-50 mg per tablet Take 2 tablets by mouth 2 (two) times daily as needed for Constipation ((First Choice)).    traMADoL (ULTRAM) 50 mg tablet Take 1 tablet (50 mg total) by mouth every 6 (six) hours.    vitamin D (VITAMIN D3) 1000 units Tab Take 5,000 Units by mouth once daily.     Family History       Problem Relation (Age of Onset)    Arthritis Mother    Diabetes Mother    Hypertension Mother    Lung cancer Father (62)          Tobacco Use    Smoking status: Never    Smokeless tobacco: Never   Substance and Sexual Activity    Alcohol use: Yes     Comment: occassionally    Drug use: Never    Sexual activity: Yes     Partners: Male     Birth control/protection: None     Review of Systems   Unable to perform ROS: Acuity of condition   Objective:     Vital Signs (Most Recent):  Temp: 97.8 °F (36.6 °C) (04/12/23 1152)  Pulse: 76 (04/12/23 1152)  Resp: 18 (04/12/23 1152)  BP: (!) 132/57 (04/12/23 1152)  SpO2: 98 % (04/12/23 0434)   Vital Signs (24h Range):  Temp:  [97.7 °F (36.5 °C)-98.1 °F (36.7 °C)] 97.8 °F (36.6 °C)  Pulse:  [66-88] 76  Resp:  [18-21] 18  SpO2:  [98 %-100 %] 98 %  BP: (100-138)/(57-82) 132/57     Weight: 102.9 kg (226 lb 13.7 oz)  Body mass index is 37.75 kg/m².    Physical Exam  Neurologic exam limited  GENERAL: NAD, obtunded  MENTAL STATUS: not following commands  SPEECH/LANGUAGE: UTO  CN:  ESTEPHANIA- 3/2  Sensory:withdraws and facial grimaces to painful stimuli in all extremities  Gait: not observed         Significant Labs:   Recent Lab Results         04/12/23  0550   04/11/23  1804   04/11/23  1803   04/11/23  1758        Base  Deficit       11.7  Comment: Result is outside patient normal (reference) range.       Correct Temperature (PH)       7.42       Corrected Temperature (pCO2)       58  Comment: Result is outside panic range (critical limits).       Corrected Temperature (pO2)       74  Comment: Result is outside patient normal (reference) range.       POC COHb       1.6       POC MetHb       1.1       POC O2Hb       94.2       Specimen source       Arterial sample       Albumin/Globulin Ratio 0.7             Albumin 2.4             Alkaline Phosphatase 171                          Ammonia 16.2   38.5           Aniso 1+             AST 88             BILIRUBIN TOTAL 0.3             BUN 5.5             Calcium 8.4             Chloride 98             CO2 31             Creatinine 0.58             eGFR >60             Globulin, Total 3.3             Glucose 118             Gran # (ANC) 12.816             Hematocrit 25.3             Hemoglobin 7.7             Instr WBC 14.4             Lymph # 0.72             Lymph Man 5             Macrocyte 1+             Magnesium 1.80             MCH 27.1             MCHC 30.4             MCV 89.1             Metamyelocytes 5             Mono # 0.864             Mono % 6             MPV 10.5             Myelocytes 10             Neutrophils Relative 74             nRBC 1.5             NRBC Man 4             Phosphorus 2.1             Platelet Estimate Adequate             Platelets 162             POC HCO3       37.6  Comment: Result is outside patient normal (reference) range.       POC HEMOGLOBIN       8.0  Comment: Result is outside patient normal (reference) range.       POC Ionized Calcium       1.14       POC PCO2       58  Comment: Result is outside panic range (critical limits).       POC PH       7.42       POC PO2       74  Comment: Result is outside patient normal (reference) range.       POC Potassium       3.7       POC SATURATED O2       95.0       POC Sodium       132  Comment:  Result is outside patient normal (reference) range.       POC Temp       37.0       POCT Glucose     121         Poikilocytosis 1+             Potassium 3.8             PROTEIN TOTAL 5.7             RBC 2.84             RBC Morph Abnormal             RDW 17.2             Sodium 138             Target Cells 1+             Thyroid Stimulating Hormone 0.884             WBC 14.4                     Significant Imaging:   CT head w/o 4/11/2023:  FINDINGS:  The patient has known cerebellar and leptomeningeal metastasis.  This is not as well visualized on today's examination as well as on the previous MRI.  No obvious mass or lesion is seen on this noncontrast CT.  No acute hemorrhage or infarction is seen.  The ventricles and basilar cisterns appear normal.  Brain parenchyma appears grossly unremarkable.     Posterior fossa appears normal.  The calvarium is intact.  The paranasal sinuses appear grossly unremarkable.     Impression:     Patient has known cerebellar and leptomeningeal metastatic disease.  This is not well appreciated on this noncontrast CT.  Correlation with contrast enhanced MRI is recommended if evaluation for progression of disease is needed.    I have reviewed all pertinent imaging results/findings within the past 24 hours.

## 2023-04-12 NOTE — CARE UPDATE
Was called at bedside to evaluate pt late afternoon after pt returned from radiation treatment. Pt's mental status further deteriorated since I saw her in the morning with decreased level of consciousness. She would open eyes with repeated verbal and tactile stimuli , would not follow commands and would not communicate. Pt's daughter , daughter in law and grand son at bedside. I explained to family that pt with known cerebellar and leptomeningeal metastasis and disease is likely progressing and seems refractory to treatment. I discussed severity of pt's status and asked them if pt has  POA and if they knew pt's wishes in the event pt developed  cardiac or respiratory arrest . Family was not ready to face this situation and no decision was made . Pt remains full code .    Will get stat CT head without contrast , ABG and ammonia level and continue supportive care.

## 2023-04-12 NOTE — PROGRESS NOTES
[facility] - 4th Floor Medical Telemetry  Wound Care    Patient Name:  Esme Marie   MRN:  [id number]  Date: 4/12/2023  Diagnosis: <principal problem not specified>    History:     Past Medical History:   Diagnosis Date    Breast cancer     Hyperlipidemia     Knee pain     Menopausal flushing     Menopause 3/2018    Normocytic normochromic anemia     Vitamin D deficiency        Social History     Socioeconomic History    Marital status: Single   Occupational History    Occupation: Supply chain [employer]   Tobacco Use    Smoking status: Never    Smokeless tobacco: Never   Substance and Sexual Activity    Alcohol use: Yes     Comment: occassionally    Drug use: Never    Sexual activity: Yes     Partners: Male     Birth control/protection: None     Social Determinants of Health     Financial Resource Strain: Low Risk     Difficulty of Paying Living Expenses: Not hard at all   Food Insecurity: No Food Insecurity    Worried About Running Out of Food in the Last Year: Never true    Ran Out of Food in the Last Year: Never true   Transportation Needs: No Transportation Needs    Lack of Transportation (Medical): No    Lack of Transportation (Non-Medical): No   Physical Activity: Inactive    Days of Exercise per Week: 0 days    Minutes of Exercise per Session: 0 min   Stress: No Stress Concern Present    Feeling of Stress : Only a little   Social Connections: Moderately Isolated    Frequency of Communication with Friends and Family: More than three times a week    Frequency of Social Gatherings with Friends and Family: More than three times a week    Attends Rastafari Services: 1 to 4 times per year    Active Member of Clubs or Organizations: No    Attends Club or Organization Meetings: Never    Marital Status: Never    Housing Stability: Unknown    Unable to Pay for Housing in the Last Year: No    Unstable Housing in the Last Year: No       Precautions:     Allergies as of 04/07/2023    (No Known  "Allergies)       C Assessment Details/Treatment     Initial visit, attempted to cleanse and assess and redress right breast wound, patient family at bedside decline stating patient too uncomfortable at this time, and will await wound care when patient more "settled," however did agree to have wound photographed for EMR. Discussed same with assigned nurse Bailey BLOCK, and provided wound care recommendations given brief assessment.      04/12/23 1030        Altered Skin Integrity 04/11/23 0800 Right upper Sternal   Date First Assessed/Time First Assessed: 04/11/23 0800   Altered Skin Integrity Present on Admission - Did Patient arrive to the hospital with altered skin?: yes  Side: Right  Orientation: upper  Location: Sternal   Wound Image    Dressing Appearance Intact;Clean;Dry   Drainage Amount Scant   Drainage Characteristics/Odor Serous   Appearance Pink;Black   Tissue loss description Full thickness   Black (%), Wound Tissue Color 40 %   Periwound Area Intact   Wound Edges Jagged   Wound Length (cm) 4 cm   Wound Width (cm) 6 cm   Wound Surface Area (cm^2) 24 cm^2   Dressing Reinforced  (Not tolerating wound care at this time.)   Dressing Change Due 04/12/23     Recommendations made to primary team for local wound care to right breast lesion . Orders placed.     04/12/2023    "

## 2023-04-12 NOTE — PLAN OF CARE
04/12/23 1136   Discharge Assessment   Assessment Type Discharge Planning Brief Assessment   Source of Information family   Communicated NAT with patient/caregiver Date not available/Unable to determine   Do you expect to return to your current living situation? Other (see comments)   Equipment Currently Used at Home hospital bed;wheelchair;bath bench   Readmission within 30 days? Yes   Patient currently being followed by outpatient case management? No   Do you currently have service(s) that help you manage your care at home? No   Are you on dialysis? No   Do you take coumadin? No   Discharge Plan A Hospice/home   Discharge Plan B Hospice/home   DME Needed Upon Discharge  other (see comments)   Discharge Plan discussed with: Adult children   Discharge Barriers Identified None     Verbal FOC obtained for [facility]. Information and referral sent via [facility]. Michelle KUMAR will reach out to family to schedule and info visit. Family wanting to take patient home with hospice care.    4/12/23 1201: Michelle KUMAR will meet with family at 3:00pm today

## 2023-04-12 NOTE — PT/OT/SLP DISCHARGE
Physical Therapy Discharge Summary    Name: Esme Marie  MRN: [id number]   Principal Problem: <principal problem not specified>     Patient Discharged from acute Physical Therapy on 23.  Please refer to prior PT noted date on 23 for functional status.     Assessment:     Patient appropriate for care in another setting.    Objective:     GOALS:   Multidisciplinary Problems       Physical Therapy Goals          Problem: Physical Therapy    Goal Priority Disciplines Outcome Goal Variances Interventions   Physical Therapy Goal     PT, PT/OT Ongoing, Progressing     Description: Goals to be met by: 23     Patient will increase functional independence with mobility by performin. Supine to sit with Stand-by Assistance  2. Sit to supine with Stand-by Assistance  3. Sit to stand transfer with Stand-by Assistance  4. Gait  x 200 feet with Stand-by Assistance using Rolling Walker.                          Reasons for Discontinuation of Therapy Services  Transfer to alternate level of care.      Plan:     Patient Discharged to: Palliative Care/Hospice.      2023     Double O-Z Flap Text: The defect edges were debeveled with a #15 scalpel blade.  Given the location of the defect, shape of the defect and the proximity to free margins a Double O-Z flap was deemed most appropriate.  Using a sterile surgical marker, an appropriate transposition flap was drawn incorporating the defect and placing the expected incisions within the relaxed skin tension lines where possible. The area thus outlined was incised deep to adipose tissue with a #15 scalpel blade.  The skin margins were undermined to an appropriate distance in all directions utilizing iris scissors.

## 2023-04-12 NOTE — PT/OT/SLP PROGRESS
Occupational Therapy      Patient Name:  Esme Marie   MRN:  [id number]    Patient not seen today secondary to nurse hold. Nurse reporting that OT order had been placed yesterday prior to change in neuro status and family decision to discharge home with hospice. Patient is no longer appropriate for acute OT evaluation. OT will discontinue orders at this time. Please re-consult if situation changes.     JAYME John    4/12/2023

## 2023-04-12 NOTE — CONSULTS
[facility] - 4th Floor Medical Telemetry  Neurology  Consult Note    Patient Name: Esme Marie  MRN: [id number]  Admission Date: 4/7/2023  Hospital Length of Stay: 5 days  Code Status: DNR   Attending Provider: Oscar Guevara MD   Consulting Provider: Diana Underwood [facility]  Primary Care Physician: Elizabeth K Lejeune, MD  Principal Problem:<principal problem not specified>    Inpatient consult to Neurology  Consult performed by: ASHLEY Lunsford  Consult ordered by: Elizabeth Kennedy Lejeune, MD         Subjective:     Chief Complaint:       HPI:   56 y/o F with PMH end stage cancer with brain metastasis who was admitted on 4/7 for SOB, diarrhea, n/v, dizziness, pancytopenic, and recent development of R pleural effusion. pt now having some focal twitching to L eyelid and L upper extremity followed by extreme lethargy, concerning for seizures. pt was started on keppra 1 gm BID and neurology consulted for seizure management.     CT head w/o revealing for cerebellar and leptomeningeal metastatic disease.       Past Medical History:   Diagnosis Date    Breast cancer     Hyperlipidemia     Knee pain     Menopausal flushing     Menopause 3/2018    Normocytic normochromic anemia     Vitamin D deficiency        Past Surgical History:   Procedure Laterality Date    PLACEMENT, MEDIPORT N/A 01/23/2023    Procedure: Placement, Mediport;  Surgeon: Silva Shields MD;  Location: [facility];  Service: General;  Laterality: N/A;    TUBAL LIGATION         Review of patient's allergies indicates:  No Known Allergies    Current Neurological Medications:     No current facility-administered medications on file prior to encounter.     Current Outpatient Medications on File Prior to Encounter   Medication Sig    meclizine (ANTIVERT) 25 mg tablet Take 1 tablet (25 mg total) by mouth 3 (three) times daily as needed for Dizziness.    morphine (MS CONTIN) 15 MG 12 hr tablet Take 1 tablet (15 mg total) by  mouth every 8 (eight) hours as needed for Pain.    OLANZapine (ZYPREXA) 5 MG tablet Take 1 tablet (5 mg total) by mouth every evening. Take as directed days 1-4 of each chemotherapy cycle.    ondansetron (ZOFRAN-ODT) 8 MG TbDL Take 1 tablet (8 mg total) by mouth every 8 (eight) hours as needed (nausea/vomiting).    oxyCODONE (ROXICODONE) 10 mg Tab immediate release tablet Take 1 tablet (10 mg total) by mouth every 4 (four) hours as needed for Pain.    prochlorperazine (COMPAZINE) 10 MG tablet Take 1 tablet (10 mg total) by mouth every 6 (six) hours as needed (for nausea). 2nd choice, can cause drowsiness.    acetaminophen (TYLENOL) 325 MG tablet Take 2 tablets (650 mg total) by mouth every 8 (eight) hours as needed for Pain (or equal to 100.4 degree F).    docusate sodium (COLACE) 100 MG capsule Take 1 capsule (100 mg total) by mouth 2 (two) times daily.    melatonin (MELATIN) 3 mg tablet Take 1 tablet (3 mg total) by mouth nightly as needed for Insomnia.    morphine (MS CONTIN) 15 MG 12 hr tablet Take 1 tablet (15 mg total) by mouth 2 (two) times daily.    naproxen sodium (ANAPROX) 220 MG tablet Take 220 mg by mouth every 12 (twelve) hours. Takes as directed prn    omega-3 fatty acids/fish oil (FISH OIL-OMEGA-3 FATTY ACIDS) 300-1,000 mg capsule Take by mouth once daily.    polyethylene glycol (GLYCOLAX) 17 gram PwPk Take 17 g by mouth daily as needed ((Second Choice)).    senna-docusate 8.6-50 mg (PERICOLACE) 8.6-50 mg per tablet Take 2 tablets by mouth 2 (two) times daily as needed for Constipation ((First Choice)).    traMADoL (ULTRAM) 50 mg tablet Take 1 tablet (50 mg total) by mouth every 6 (six) hours.    vitamin D (VITAMIN D3) 1000 units Tab Take 5,000 Units by mouth once daily.     Family History       Problem Relation (Age of Onset)    Arthritis Mother    Diabetes Mother    Hypertension Mother    Lung cancer Father (62)          Tobacco Use    Smoking status: Never    Smokeless tobacco:  Never   Substance and Sexual Activity    Alcohol use: Yes     Comment: occassionally    Drug use: Never    Sexual activity: Yes     Partners: Male     Birth control/protection: None     Review of Systems   Unable to perform ROS: Acuity of condition   Objective:     Vital Signs (Most Recent):  Temp: 97.8 °F (36.6 °C) (04/12/23 1152)  Pulse: 76 (04/12/23 1152)  Resp: 18 (04/12/23 1152)  BP: (!) 132/57 (04/12/23 1152)  SpO2: 98 % (04/12/23 0434)   Vital Signs (24h Range):  Temp:  [97.7 °F (36.5 °C)-98.1 °F (36.7 °C)] 97.8 °F (36.6 °C)  Pulse:  [66-88] 76  Resp:  [18-21] 18  SpO2:  [98 %-100 %] 98 %  BP: (100-138)/(57-82) 132/57     Weight: 102.9 kg (226 lb 13.7 oz)  Body mass index is 37.75 kg/m².    Physical Exam  Neurologic exam limited  GENERAL: NAD, obtunded  MENTAL STATUS: not following commands  SPEECH/LANGUAGE: UTO  CN:  PERRLA- 3/2  Sensory:withdraws and facial grimaces to painful stimuli in all extremities  Gait: not observed         Significant Labs:   Recent Lab Results         04/12/23  0550   04/11/23  1804   04/11/23  1803   04/11/23  1758        Base Deficit       11.7  Comment: Result is outside patient normal (reference) range.       Correct Temperature (PH)       7.42       Corrected Temperature (pCO2)       58  Comment: Result is outside panic range (critical limits).       Corrected Temperature (pO2)       74  Comment: Result is outside patient normal (reference) range.       POC COHb       1.6       POC MetHb       1.1       POC O2Hb       94.2       Specimen source       Arterial sample       Albumin/Globulin Ratio 0.7             Albumin 2.4             Alkaline Phosphatase 171                          Ammonia 16.2   38.5           Aniso 1+             AST 88             BILIRUBIN TOTAL 0.3             BUN 5.5             Calcium 8.4             Chloride 98             CO2 31             Creatinine 0.58             eGFR >60             Globulin, Total 3.3             Glucose 118              Gran # (ANC) 12.816             Hematocrit 25.3             Hemoglobin 7.7             Instr WBC 14.4             Lymph # 0.72             Lymph Man 5             Macrocyte 1+             Magnesium 1.80             MCH 27.1             MCHC 30.4             MCV 89.1             Metamyelocytes 5             Mono # 0.864             Mono % 6             MPV 10.5             Myelocytes 10             Neutrophils Relative 74             nRBC 1.5             NRBC Man 4             Phosphorus 2.1             Platelet Estimate Adequate             Platelets 162             POC HCO3       37.6  Comment: Result is outside patient normal (reference) range.       POC HEMOGLOBIN       8.0  Comment: Result is outside patient normal (reference) range.       POC Ionized Calcium       1.14       POC PCO2       58  Comment: Result is outside panic range (critical limits).       POC PH       7.42       POC PO2       74  Comment: Result is outside patient normal (reference) range.       POC Potassium       3.7       POC SATURATED O2       95.0       POC Sodium       132  Comment: Result is outside patient normal (reference) range.       POC Temp       37.0       POCT Glucose     121         Poikilocytosis 1+             Potassium 3.8             PROTEIN TOTAL 5.7             RBC 2.84             RBC Morph Abnormal             RDW 17.2             Sodium 138             Target Cells 1+             Thyroid Stimulating Hormone 0.884             WBC 14.4                     Significant Imaging:   CT head w/o 4/11/2023:  FINDINGS:  The patient has known cerebellar and leptomeningeal metastasis.  This is not as well visualized on today's examination as well as on the previous MRI.  No obvious mass or lesion is seen on this noncontrast CT.  No acute hemorrhage or infarction is seen.  The ventricles and basilar cisterns appear normal.  Brain parenchyma appears grossly unremarkable.     Posterior fossa appears normal.  The calvarium is  intact.  The paranasal sinuses appear grossly unremarkable.     Impression:     Patient has known cerebellar and leptomeningeal metastatic disease.  This is not well appreciated on this noncontrast CT.  Correlation with contrast enhanced MRI is recommended if evaluation for progression of disease is needed.    I have reviewed all pertinent imaging results/findings within the past 24 hours.    Assessment and Plan:     Seizures  Continue keppra 1000 mg BID   Give ativan 2 mg PRN for witnessed seizures  Awaiting EEG results  Seizure precautions          VTE Risk Mitigation (From admission, onward)         Ordered     enoxaparin injection 40 mg  Daily         04/07/23 2205     IP VTE HIGH RISK PATIENT  Once         04/07/23 2205     Place sequential compression device  Until discontinued         04/07/23 2205                Thank you for your consult. Further recommendations to follow per MD Diana Underwood, [facility]  Inpatient Neurology  [facility] - 4th Floor Medical Telemetry

## 2023-04-13 NOTE — PROGRESS NOTES
Infectious Diseases Progress Note  57-year-old female with past medical history of triple negative breast cancer with metastasis to the brain and bone, was on chemotherapy which was put on hold due to pancytopenia as well as was on radiation therapy discontinued 03/27/2023, apparently at time of developing pleural effusion with persistent pancytopenia, is admitted to [facility] on 04/07/2023 with shortness of breath, diarrhea, nausea, vomiting, and dizziness.  She was evaluated and noted to have no fevers but pancytopenic with WBC 1.6, platelets 76 and anemic.  Elevated transaminases with AST 62 and ALT 96, low albumin.  Urinalysis was not impressive.  Chest x-ray showed new right pleural effusion and right lower lung zone atelectasis and/or infiltrate.  CT chest, abdomen and pelvis without contrast showed unchanged soft tissue inflammation around the right shoulder, right axilla and right chest wall, right breast asymmetric density and skin thickening to be correlated with mammography findings, unchanged right axillary lymphadenopathy, persistent right pleural effusion and right lower lung lobe collapse consolidation, cecum and ascending colon irregular mural thickening and surrounding inflammatory changes which may be related to colitis with concern for malignancy, pericecal multiple nonenlarged lymph nodes, extensive skeletal sclerotic lesion consistent with metastasis.  Ultrasound of the chest with minimal right pleural effusion.    She is on Dificid.    Subjective:  Interval history noted and not much better, no fevers, doing about the same.  Lying in bed in no acute distress.  Sister at the bedside      Past Medical History:   Diagnosis Date    Breast cancer     Hyperlipidemia     Knee pain     Menopausal flushing     Menopause 3/2018    Normocytic normochromic anemia     Vitamin D deficiency      Past Surgical History:   Procedure Laterality Date    PLACEMENT, MEDIPORT N/A  01/23/2023    Procedure: Placement, Mediport;  Surgeon: Silva Shields MD;  Location: [facility];  Service: General;  Laterality: N/A;    TUBAL LIGATION       Social History     Socioeconomic History    Marital status: Single   Occupational History    Occupation: [employer]   Tobacco Use    Smoking status: Never    Smokeless tobacco: Never   Substance and Sexual Activity    Alcohol use: Yes     Comment: occassionally    Drug use: Never    Sexual activity: Yes     Partners: Male     Birth control/protection: None     Social Determinants of Health     Financial Resource Strain: Low Risk     Difficulty of Paying Living Expenses: Not hard at all   Food Insecurity: No Food Insecurity    Worried About Running Out of Food in the Last Year: Never true    Ran Out of Food in the Last Year: Never true   Transportation Needs: No Transportation Needs    Lack of Transportation (Medical): No    Lack of Transportation (Non-Medical): No   Physical Activity: Inactive    Days of Exercise per Week: 0 days    Minutes of Exercise per Session: 0 min   Stress: No Stress Concern Present    Feeling of Stress : Only a little   Social Connections: Moderately Isolated    Frequency of Communication with Friends and Family: More than three times a week    Frequency of Social Gatherings with Friends and Family: More than three times a week    Attends Protestant Services: 1 to 4 times per year    Active Member of Clubs or Organizations: No    Attends Club or Organization Meetings: Never    Marital Status: Never    Housing Stability: Unknown    Unable to Pay for Housing in the Last Year: No    Unstable Housing in the Last Year: No       Review of Systems   Unable to perform ROS: Mental status change       Review of patient's allergies indicates:  No Known Allergies      Scheduled Meds:   B12-levomefolate calcium-B6  1 tablet Oral Daily    enoxaparin  40 mg Subcutaneous Daily    famotidine  20 mg Oral BID    ferrous sulfate  1 tablet  "Oral Daily    fidaxomicin  200 mg Oral BID    levetiracetam IV  1,000 mg Intravenous Q12H    midodrine  5 mg Oral TID WM    piperacillin-tazobactam (ZOSYN) IVPB  4.5 g Intravenous Q8H    sodium chloride 0.9%  10 mL Intravenous Q6H    thiamine  100 mg Oral Daily     Continuous Infusions:  PRN Meds:loperamide, lorazepam, meclizine, melatonin, morphine, ondansetron, sodium chloride 0.9%, Flushing PICC Protocol **AND** sodium chloride 0.9% **AND** sodium chloride 0.9%    Objective:  /72 (BP Location: Left arm, Patient Position: Lying)   Pulse 73   Temp 98.4 °F (36.9 °C) (Axillary)   Resp 19   Ht 5' 5" (1.651 m)   Wt 102.9 kg (226 lb 13.7 oz)   SpO2 99%   Breastfeeding No   BMI 37.75 kg/m²     Physical Exam:   Physical Exam  Vitals reviewed.   Constitutional:       General: She is not in acute distress.     Comments: Lethargic.   HENT:      Head: Normocephalic and atraumatic.   Cardiovascular:      Rate and Rhythm: Normal rate and regular rhythm.   Pulmonary:      Effort: Pulmonary effort is normal.      Breath sounds: Normal breath sounds.   Abdominal:      General: Bowel sounds are normal. There is no distension.      Palpations: Abdomen is soft.      Tenderness: There is no abdominal tenderness.   Musculoskeletal:      Cervical back: Neck supple.      Right lower leg: No edema.      Left lower leg: No edema.   Skin:     Findings: No rash.      Comments: Right breast cancer wound with induration   Neurological:      Mental Status: Does not follow commands     Imaging  Imaging Results              CT Chest Abdomen Pelvis Without Contrast (XPD) (Final result)  Result time 04/07/23 20:56:07      Final result by Ryley Reno MD (04/07/23 20:56:07)                   Impression:      1.  Unchanged soft tissue inflammations around the right shoulder, right axilla and right chest wall.  Right breast asymmetric density and skin thickening.  Please correlate with mammography findings.    2.  Unchanged right " axillary lymphadenopathy.    3.  Persistent right pleural effusion and right lower lung lobe collapse consolidation.  4.  Cecum and ascending colon irregular mural thickening and surrounding inflammatory change may be related to colitis with concern for malignancy.  There are pericecal multiple nonenlarged lymph nodes.. 5.  Extensive skeletal sclerotic lesion consistent with metastasis.      Electronically signed by: Ryley Reno  Date:    04/07/2023  Time:    20:56               Narrative:    EXAMINATION:  CT CHEST ABDOMEN PELVIS WITHOUT CONTRAST(XPD)    CLINICAL HISTORY:  Sepsis;    TECHNIQUE:  Multidetector axial images were obtained from the thoracic inlet through the greater trochanters without the administration of IV contrast.    Dose length product of 336 mGycm. Automated exposure control was utilized to minimize radiation dose.    COMPARISON:  CT chest March 23, 2023    CHEST FINDINGS:    There is no significant difference extensive soft tissue inflammation about the right shoulder, right axilla and the right anterior chest wall.  There are right axillary enlarged lymph nodes without significant difference in overall size and count.  There is right breast asymmetric density and skin thickening.    There is about similar size of moderate to large right pleural effusion and right lower lung zone collapse consolidation with air bronchograms.  Right upper lung lobe hypoventilatory changes.  Left lung and the left pleural space are unremarkable.  No pericardial effusion.  There nonenlarged mediastinal lymph nodes.  Thoracic aorta is without aneurysmal dilatation.  Cardiac chamber size is within normal limits.  Venous line terminates within distal superior vena cava.    ABDOMINAL FINDINGS:    Within limitation noncontrast technique, no acute findings liver, pancreas or the spleen identified.  Gallbladder lumen contains dense calcified gallstones without thickened wall and there is no apparent dilatation of ducts.   Adrenals are not enlarged.  No renal calculi or acute obstructive uropathy.  Kidneys cortical volume is adequate.    Stomach is decompressed.  No abnormal dilatation of loops of small bowel.  Appendix is not visualized.  There is irregular mural thickening of the cecum and the ascending colon with surrounding inflammatory changes. There are also adjacent multiple up to 1.2 cm lymph nodes.  The remaining colon is nondistended without pericolonic acute standings.  No bowel obstruction.  No free air or free fluid.    PELVIC FINDINGS:    Urinary bladder wall is slightly thickened.  Uterus is not enlarged in size.  Endometrium is not delineated.  No pelvic free fluid.    There are heterogeneous sclerotic changes of the sternum.  Several thoracolumbar vertebrae show variable degree of sclerotic changes consistent with metastatic involvement.  Within thoracic spine the most dominant involvement is of T9 and T10 vertebral bodies.  Within lumbar spine the most apparent involvement is of L3 vertebral body.  There is also heterogeneous sclerosis of bony pelvis.                                       X-Ray Chest 1 View (Final result)  Result time 04/07/23 20:12:58      Final result by Ryley Reno MD (04/07/23 20:12:58)                   Impression:      New right pleural effusion and right lower lung zone atelectasis and/or infiltrates.      Electronically signed by: Ryley Reno  Date:    04/07/2023  Time:    20:12               Narrative:    EXAMINATION:  XR CHEST 1 VIEW    CLINICAL HISTORY:  fatigue;    TECHNIQUE:  One view    COMPARISON:  March 23, 2023..    FINDINGS:  Cardiopericardial silhouette appearance is similar.  There is right pleural effusion and right mid to lower lung zone atelectasis and/or infiltrates.  These findings are new since the previous exam.  Low volume lungs with perhaps slight congestive process.  There is no pneumothorax.  Left chest implanted tunnel vesta catheter terminates within the  superior vena cava.                        Wet Read by Flako Gray DO (04/07/23 19:53:39, [facility] - Emergency Dept, Emergency Medicine) Flagged as Abnormal    A chest X-Ray was ordered. My reading of this film is LARGE right plural effusion. (Pending review by Radiologist.)                                       Lab Review   Recent Results (from the past 24 hour(s))   Comprehensive Metabolic Panel    Collection Time: 04/12/23  5:50 AM   Result Value Ref Range    Sodium Level 138 136 - 145 mmol/L    Potassium Level 3.8 3.5 - 5.1 mmol/L    Chloride 98 98 - 107 mmol/L    Carbon Dioxide 31 (H) 22 - 29 mmol/L    Glucose Level 118 (H) 74 - 100 mg/dL    Blood Urea Nitrogen 5.5 (L) 9.8 - 20.1 mg/dL    Creatinine 0.58 0.55 - 1.02 mg/dL    Calcium Level Total 8.4 8.4 - 10.2 mg/dL    Protein Total 5.7 (L) 6.4 - 8.3 gm/dL    Albumin Level 2.4 (L) 3.5 - 5.0 g/dL    Globulin 3.3 2.4 - 3.5 gm/dL    Albumin/Globulin Ratio 0.7 (L) 1.1 - 2.0 ratio    Bilirubin Total 0.3 <=1.5 mg/dL    Alkaline Phosphatase 171 (H) 40 - 150 unit/L    Alanine Aminotransferase 126 (H) 0 - 55 unit/L    Aspartate Aminotransferase 88 (H) 5 - 34 unit/L    eGFR >60 mls/min/1.73/m2   Magnesium    Collection Time: 04/12/23  5:50 AM   Result Value Ref Range    Magnesium Level 1.80 1.60 - 2.60 mg/dL   Phosphorus    Collection Time: 04/12/23  5:50 AM   Result Value Ref Range    Phosphorus Level 2.1 (L) 2.3 - 4.7 mg/dL   Ammonia    Collection Time: 04/12/23  5:50 AM   Result Value Ref Range    Ammonia Level 16.2 (L) 18.0 - 72.0 umol/L   TSH    Collection Time: 04/12/23  5:50 AM   Result Value Ref Range    Thyroid Stimulating Hormone 0.884 0.350 - 4.940 uIU/mL   CBC with Differential    Collection Time: 04/12/23  5:50 AM   Result Value Ref Range    WBC 14.4 (H) 4.5 - 11.5 x10(3)/mcL    RBC 2.84 (L) 4.20 - 5.40 x10(6)/mcL    Hgb 7.7 (L) 12.0 - 16.0 g/dL    Hct 25.3 (L) 37.0 - 47.0 %    MCV 89.1 80.0 - 94.0 fL    MCH 27.1 27.0 - 31.0 pg     MCHC 30.4 (L) 33.0 - 36.0 g/dL    RDW 17.2 (H) 11.5 - 17.0 %    Platelet 162 130 - 400 x10(3)/mcL    MPV 10.5 (H) 7.4 - 10.4 fL    NRBC% 1.5 %   Manual Differential    Collection Time: 04/12/23  5:50 AM   Result Value Ref Range    Neut Man 74 %    Lymph Man 5 %    Monocyte Man 6 %    Smilax Man 5 %    Myelo Man 10 %    Instr WBC 14.4 x10(3)/mcL    Abs Mono 0.864 0.1 - 1.3 x10(3)/mcL    Abs Lymp 0.72 0.6 - 4.6 x10(3)/mcL    Abs Neut 12.816 (H) 2.1 - 9.2 x10(3)/mcL    NRBC Man 4 %    RBC Morph Abnormal (A) Normal    Anisocyte 1+ (A) (none)    Poik 1+ (A) (none)    Macrocyte 1+ (A) (none)    Target Cell 1+ (A) (none)    Platelet Est Adequate Normal, Adequate             Assessment/Plan:  1. C difficile colitis  2. Right pneumonitis with pleural effusion  3. Encephalopathy  4. Metastatic breast cancer stage IV  5. Pancytopenia  6. Transaminitis        -On Dificid #4, and Zosyn has been discontinued  -No fever and with leukocytosis noted  -4/7 Blood cultures negative thus far, follow  -Stool for c-diff PCR (+) and toxin (-)  -Maintain strict contact isolation for c-diff  -Mental status change today, CT head without contrast showed no new findings  -Neurology consulted for possible seizures, follow recs  -Oncology on board, inputs noted including recommendation for palliative care, since no role for any further therapy measures, no benefit at this stage  -Continue wound care  -Discussed with sister and nursing.  Patient has been made a DNR and being transitioned to palliative care.  -Very poor prognosis.  We will sign off.  Please call if needed.  Thank you.

## 2023-04-13 NOTE — PLAN OF CARE
Contacted [facility], patient equipment will be delivered today.  Daughter in law Kleber will be ready for patient this afternoon.  [facility] is scheduled for pickup at 3:30pm

## 2023-04-28 NOTE — DISCHARGE SUMMARY
[facility]  Hospital Medicine Discharge Summary    Admit Date: 4/7/2023  Discharge Date and Time: 4/13/2023 4:55 PM  Admitting Physician: Kodak Hernandes MD  Discharging Physician: Flako Winchester MD.  Primary Care Physician: Elizabeth K Lejeune, MD  Consults: Neurology, Pulmonology, Medical Oncology, Infectious Disease, Palliative Medicine    Discharge Diagnoses:  Failure to thrive  Encephalopathy, metabolic, in the setting of progressive leptomeningeal carcinomatosis   Stage IV Metastatic Triple Negative Inflammatory Breast Cancer, diagnosed in 12/2022  Brain and extensive bone metastasis  Suspected seizures   Clostridium difficile infection and colitis, initial episode   Right lower lobe pneumonia due to unspecified organism   Right pleural effusion and right lower lobe collapse.    Pancytopenia, likely treatment induced   Anemia of chronic disease   Cancer related pain    Hospital Course:   The patient is a 57-year-old  female with history of triple negative breast cancer with metastasis to the Brain into bone. She was previously on chemotherapy and was stopped for pancytopenia and started on radiotherapy but this was discontinued on March 27 reportedly because she developed the right pleural effusion and pt has remained pancytopenic. Today the patient has shortness of breath, diarrhea, nausea, dizziness, vomiting times one week. She denies any chest pain or abdominal pain. She does have some baseline cancer related pain. CT scan of the abdomen and pelvis obtained in the emergency department is significant for colitis which may be related to malignancy and persistent right plural effusion with consolidation and collapse of lower right lung. Patient was started on IV fluids, IV antibiotics Zosyn in the emergency department. Of note pt became hypotensive after receiving IV pain medication in the ED. Midodrine added for blood pressure support. Stool for Clostridium  difficile toxin PCR is positive . Stool cultures are negative for enteric bacteria. Blood cultures are negative to date.      Patient was admitted to the hospital medicine service for further management of right lower lobe pneumonia due to unspecified organism, right pleural effusion with right lower lobe collapse and Clostridium difficile colitis, initial episode.  Patient was continued on Zosyn x 5 days (completed) and oral Dificid. Medical Oncology, Infectious Disease and Pulmonology were consulted.  Patient continued on radiation treatment to right breast area.  Right sided thoracentesis and bronchoscopy was discussed, however Oncology suggested interventional pulmonology assistance is not indicated at this time.      On 4/11/2023 , pt developed some focal twitching to L eyelid and L upper extremity followed by extreme lethargy, concerning for seizures. pt started on keppra 1 gm BID and neurology consulted for seizure management. Pt remained  with decreased level of consciousness and continued to worse throughout the day. CT head done  showed evidence of cerebellar and leptomeningeal metastatic disease. Ammonia level was normal. ABG showed compensated hypercapnia.      Patient remains with decreased level of consciousness. Only opens eyes to repeated verbal and tactile stimulation and does not follow commands. Oncology suggested at this point there is no role for any further cancer directed treatment. Palliative Medicine was consulted and care transitioned to comfort focused care. Current code status is DNR.  Patient was discharged home with hospice.  Patient was seen and examined on the day of discharge.      Vitals:  VITAL SIGNS: 24 HRS MIN & MAX LAST   No data recorded 97.4 °F (36.3 °C)   No data recorded (!) 143/80     No data recorded  96   No data recorded 20   No data recorded 99 %       Physical Exam:  General: In no acute distress, afebrile, O2 via NC 2L/min  Chest: Decreased breath sounds Rt lung mid  and lower lobe. Left lung- clear. (+) soft tissue swelling , erythema with skin breakdown Rt breast, Swelling extending to Rt upper chest , shoulder and  entire RUE lymphedema.   Heart: RRR, +S1, S2, no appreciable murmur  Abdomen: Soft, nontender, BS +  MSK: Warm, no lower extremity edema, no clubbing or cyanosis, RUE lymphedema   Neurologic: Obtunded. Does not follow commands.     Procedures Performed: No admission procedures for hospital encounter.     Significant Diagnostic Studies: See Full reports for all details    No results for input(s): WBC, RBC, HGB, HCT, MCV, MCH, MCHC, RDW, PLT, MPV, GRAN, LYMPH, MONO, BASO, NRBC in the last 168 hours.    No results for input(s): NA, K, CL, CO2, ANIONGAP, BUN, CREATININE, GLU, CALCIUM, PH, MG, ALBUMIN, PROT, ALKPHOS, ALT, AST, BILITOT in the last 168 hours.     Microbiology Results (last 7 days)       Procedure Component Value Units Date/Time    Blood Culture #2 **CANNOT BE ORDERED STAT** [[id number]]  (Normal) Collected: 04/07/23 2035    Order Status: Completed Specimen: Blood from Arm, Left Updated: 04/12/23 2201     CULTURE, BLOOD ([facility]) No Growth at 5 days    Blood Culture #1 **CANNOT BE ORDERED STAT** [[id number]]  (Normal) Collected: 04/07/23 2015    Order Status: Completed Specimen: Blood from Antecubital, Left Updated: 04/12/23 2201     CULTURE, BLOOD ([facility]) No Growth at 5 days    Stool Culture **CANNOT BE ORDERED STAT** [[id number]]  (Normal) Collected: 04/08/23 0734    Order Status: Completed Specimen: Stool Updated: 04/10/23 0951     Stool Culture Negative for Salmonella, Shigella, Campylobacter, Vibrio, Aeromonas, Pleisiomonas,Yersinia, or Shiga Toxin 1 and 2.    Clostridium Diff Toxin, A & B, EIA [[id number]]  (Abnormal) Collected: 04/08/23 0734    Order Status: Completed Specimen: Stool Updated: 04/08/23 1354     Clostridium Difficile GDH Antigen Positive     Clostridium Difficile Toxin A/B Negative    Stool Culture **CANNOT BE ORDERED STAT** [[id number]]      Order Status: Canceled Specimen: Stool     C Diff Toxin by PCR [[id number]]  (Abnormal) Collected: 04/08/23 0734    Order Status: Completed Specimen: Stool Updated: 04/08/23 1319     Clostridium difficile toxin PCR Detected    Narrative:      The Xpert C.difficile is a qualitative in vitro diagnostic test for rapid detection of the toxin B gene sequences of toxigenic Clostridium difficile from unformed (liquid or soft) stool specimens collected from patients suspected of having C. difficile infection (CDI).    Clostridium Diff Toxin, A & B, EIA [[id number]]     Order Status: Canceled Specimen: Stool              CT Head Without Contrast  Narrative: EXAMINATION:  CT HEAD WITHOUT CONTRAST    CLINICAL HISTORY:  Mental status change, unknown cause;    TECHNIQUE:  Multiple axial images were obtained from the base of the brain to the vertex without contrast administration.  Sagittal and coronal reconstructions were performed. .Automatic exposure control  (AEC) is utilized to reduce patient radiation exposure.    COMPARISON:  MRI from 03/24/2023 and CT scan from 03/23/2023    FINDINGS:  The patient has known cerebellar and leptomeningeal metastasis.  This is not as well visualized on today's examination as well as on the previous MRI.  No obvious mass or lesion is seen on this noncontrast CT.  No acute hemorrhage or infarction is seen.  The ventricles and basilar cisterns appear normal.  Brain parenchyma appears grossly unremarkable.    Posterior fossa appears normal.  The calvarium is intact.  The paranasal sinuses appear grossly unremarkable.  Impression: Patient has known cerebellar and leptomeningeal metastatic disease.  This is not well appreciated on this noncontrast CT.  Correlation with contrast enhanced MRI is recommended if evaluation for progression of disease is needed.    Electronically signed by: Leoncio Valente  Date:    04/11/2023  Time:    18:41         Medication List        STOP taking these medications       acetaminophen 325 MG tablet  Commonly known as: TYLENOL     docusate sodium 100 MG capsule  Commonly known as: COLACE     fish oil-omega-3 fatty acids 300-1,000 mg capsule     meclizine 25 mg tablet  Commonly known as: ANTIVERT     melatonin 3 mg tablet  Commonly known as: MELATIN     morphine 15 MG 12 hr tablet  Commonly known as: MS CONTIN     naproxen sodium 220 MG tablet  Commonly known as: ANAPROX     OLANZapine 5 MG tablet  Commonly known as: ZyPREXA     ondansetron 8 MG Tbdl  Commonly known as: ZOFRAN-ODT     oxyCODONE 10 mg Tab immediate release tablet  Commonly known as: ROXICODONE     polyethylene glycol 17 gram Pwpk  Commonly known as: GLYCOLAX     prochlorperazine 10 MG tablet  Commonly known as: COMPAZINE     senna-docusate 8.6-50 mg 8.6-50 mg per tablet  Commonly known as: PERICOLACE     traMADoL 50 mg tablet  Commonly known as: ULTRAM     vitamin D 1000 units Tab  Commonly known as: VITAMIN D3               Discharge Disposition: Hospice/Home     Discharge time 35 minutes    For worsening symptoms, chest pain, shortness of breath, increased abdominal pain, high grade fever, stroke or stroke like symptoms, immediately go to the nearest Emergency Room or call 911 as soon as possible.      Flako Oconnor M.D on 4/28/2023. at 3:22 PM.

## 2025-05-15 NOTE — ADDENDUM NOTE
Addended by: LEJEUNE, ELIZABETH on: 1/31/2023 09:49 AM     Modules accepted: Orders     Medications reviewed and updated.  Denies known Latex allergy or symptoms of Latex sensitivity.  Tobacco verified.         Health Maintenance       COVID-19 Vaccine (9 - 2024-25 season)  Overdue since 5/4/2025    Lung Cancer Screening (Yearly)  Due since 5/9/2025    Shingles Vaccine (1 of 2)  Never done    Respiratory Syncytial Virus (RSV) Vaccine 60+ (1 - Risk 60-74 years 1-dose series)  Never done    Microalbumin Ratio (Yearly)  Order placed this encounter    Medicare Advantage- Medicare Wellness Visit (Yearly - January to December)  Scheduled for 5/15/2025           Following review of the above:  Pended orders covid vaccine    Note: Refer to final orders and clinician documentation.

## (undated) DEVICE — CLOSURE SKIN STERI STRIP 1/2X4

## (undated) DEVICE — SUT STRATAFIX 4-0 30CM PS-2

## (undated) DEVICE — BAG MEDI-PLAST DECANTER C-FLOW

## (undated) DEVICE — ELECTRODE REM POLYHESIVE II

## (undated) DEVICE — DRESSING TRANS 4X4 TEGADERM

## (undated) DEVICE — GLOVE 6.0 PROTEXIS PI MICRO

## (undated) DEVICE — Device

## (undated) DEVICE — DRESSING TEGADERM CHG 3.5X4.5

## (undated) DEVICE — SOL NACL .9P 500ML

## (undated) DEVICE — BLADE SURG STAINLESS STEEL #11

## (undated) DEVICE — SUT MCRYL PLUS 3-0 PS2 27IN

## (undated) DEVICE — NDL SYR 10ML 18X1.5 LL BLUNT

## (undated) DEVICE — GLOVE PROTEXIS PI SYN SURG 6.0

## (undated) DEVICE — ADHESIVE DERMABOND ADVANCED

## (undated) DEVICE — GOWN X-LG STERILE BACK

## (undated) DEVICE — SUPPORT ULNA NERVE PROTECTOR

## (undated) DEVICE — COVER C-ARM STRAP BAND 44X80IN

## (undated) DEVICE — ELECTRODE BLADE INSULATED 1 IN

## (undated) DEVICE — NDL HYPO REG 25G X 1 1/2